# Patient Record
Sex: FEMALE | Race: WHITE | Employment: UNEMPLOYED | URBAN - METROPOLITAN AREA
[De-identification: names, ages, dates, MRNs, and addresses within clinical notes are randomized per-mention and may not be internally consistent; named-entity substitution may affect disease eponyms.]

---

## 2017-08-30 ENCOUNTER — OFFICE VISIT (OUTPATIENT)
Dept: FAMILY MEDICINE CLINIC | Age: 62
End: 2017-08-30

## 2017-08-30 VITALS
HEART RATE: 102 BPM | WEIGHT: 118 LBS | RESPIRATION RATE: 20 BRPM | TEMPERATURE: 98.5 F | DIASTOLIC BLOOD PRESSURE: 78 MMHG | HEIGHT: 65 IN | SYSTOLIC BLOOD PRESSURE: 124 MMHG | BODY MASS INDEX: 19.66 KG/M2

## 2017-08-30 DIAGNOSIS — T85.848D DENTAL IMPLANT PAIN, SUBSEQUENT ENCOUNTER: ICD-10-CM

## 2017-08-30 DIAGNOSIS — J45.909 UNCOMPLICATED ASTHMA, UNSPECIFIED ASTHMA SEVERITY: ICD-10-CM

## 2017-08-30 DIAGNOSIS — J30.2 SEASONAL ALLERGIC RHINITIS, UNSPECIFIED ALLERGIC RHINITIS TRIGGER: ICD-10-CM

## 2017-08-30 DIAGNOSIS — N20.0 KIDNEY STONE: Primary | ICD-10-CM

## 2017-08-30 DIAGNOSIS — K04.7 TOOTH ABSCESS: ICD-10-CM

## 2017-08-30 DIAGNOSIS — Z09 HOSPITAL DISCHARGE FOLLOW-UP: ICD-10-CM

## 2017-08-30 DIAGNOSIS — B02.9 HERPES ZOSTER WITHOUT COMPLICATION: ICD-10-CM

## 2017-08-30 LAB
BILIRUBIN, POC: NORMAL
BLOOD URINE, POC: NORMAL
CLARITY, POC: CLEAR
COLOR, POC: NORMAL
GLUCOSE URINE, POC: NORMAL
KETONES, POC: NORMAL
LEUKOCYTE EST, POC: NORMAL
NITRITE, POC: NORMAL
PH, POC: 7
PROTEIN, POC: NORMAL
SPECIFIC GRAVITY, POC: 1.02
UROBILINOGEN, POC: 0.2

## 2017-08-30 PROCEDURE — 81002 URINALYSIS NONAUTO W/O SCOPE: CPT | Performed by: FAMILY MEDICINE

## 2017-08-30 PROCEDURE — 99203 OFFICE O/P NEW LOW 30 MIN: CPT | Performed by: FAMILY MEDICINE

## 2017-08-30 RX ORDER — CHLORHEXIDINE GLUCONATE 1.2 MG/ML
RINSE BUCCAL
Refills: 0 | COMMUNITY
Start: 2017-07-18 | End: 2017-09-06 | Stop reason: ALTCHOICE

## 2017-08-30 RX ORDER — CETIRIZINE HYDROCHLORIDE 10 MG/1
10 TABLET ORAL
COMMUNITY
Start: 2017-06-19 | End: 2018-05-22 | Stop reason: SDUPTHER

## 2017-08-30 RX ORDER — AZELASTINE 1 MG/ML
2 SPRAY, METERED NASAL
COMMUNITY
Start: 2017-02-21 | End: 2018-07-16

## 2017-08-30 RX ORDER — BUPROPION HYDROCHLORIDE 150 MG/1
150 TABLET, EXTENDED RELEASE ORAL DAILY
COMMUNITY
Start: 2017-03-13 | End: 2018-03-30 | Stop reason: SDUPTHER

## 2017-08-30 RX ORDER — ALBUTEROL SULFATE 90 UG/1
2 AEROSOL, METERED RESPIRATORY (INHALATION)
COMMUNITY
Start: 2017-02-27 | End: 2018-07-16

## 2017-08-30 RX ORDER — HYDROCODONE BITARTRATE AND ACETAMINOPHEN 5; 325 MG/1; MG/1
1 TABLET ORAL 2 TIMES DAILY PRN
Qty: 30 TABLET | Refills: 0 | Status: SHIPPED | OUTPATIENT
Start: 2017-08-30 | End: 2017-09-14 | Stop reason: SDUPTHER

## 2017-08-30 RX ORDER — PENICILLIN V POTASSIUM 500 MG/1
500 TABLET ORAL 4 TIMES DAILY
COMMUNITY
End: 2017-10-24 | Stop reason: ALTCHOICE

## 2017-08-30 ASSESSMENT — ENCOUNTER SYMPTOMS
SORE THROAT: 0
DIARRHEA: 0
EYE ITCHING: 0
ABDOMINAL PAIN: 0
SHORTNESS OF BREATH: 0
COUGH: 0
CONSTIPATION: 0
EYE DISCHARGE: 0
SINUS PRESSURE: 0

## 2017-08-30 ASSESSMENT — PATIENT HEALTH QUESTIONNAIRE - PHQ9
SUM OF ALL RESPONSES TO PHQ QUESTIONS 1-9: 0
SUM OF ALL RESPONSES TO PHQ9 QUESTIONS 1 & 2: 0
1. LITTLE INTEREST OR PLEASURE IN DOING THINGS: 0

## 2017-09-06 ENCOUNTER — OFFICE VISIT (OUTPATIENT)
Dept: FAMILY MEDICINE CLINIC | Age: 62
End: 2017-09-06

## 2017-09-06 VITALS
TEMPERATURE: 97.9 F | WEIGHT: 116 LBS | DIASTOLIC BLOOD PRESSURE: 72 MMHG | SYSTOLIC BLOOD PRESSURE: 112 MMHG | HEIGHT: 64 IN | HEART RATE: 82 BPM | BODY MASS INDEX: 19.81 KG/M2 | RESPIRATION RATE: 16 BRPM

## 2017-09-06 DIAGNOSIS — R10.13 ABDOMINAL PAIN, EPIGASTRIC: ICD-10-CM

## 2017-09-06 DIAGNOSIS — N28.89 LEFT RENAL MASS: Primary | ICD-10-CM

## 2017-09-06 PROCEDURE — 99213 OFFICE O/P EST LOW 20 MIN: CPT | Performed by: FAMILY MEDICINE

## 2017-09-06 ASSESSMENT — ENCOUNTER SYMPTOMS
SINUS PRESSURE: 0
SORE THROAT: 0
VOMITING: 0
WHEEZING: 0
ABDOMINAL PAIN: 1
DIARRHEA: 1
COUGH: 0
NAUSEA: 1
CHEST TIGHTNESS: 0
SHORTNESS OF BREATH: 0
RHINORRHEA: 0

## 2017-09-14 RX ORDER — HYDROCODONE BITARTRATE AND ACETAMINOPHEN 5; 325 MG/1; MG/1
1 TABLET ORAL 2 TIMES DAILY PRN
Qty: 30 TABLET | Refills: 0 | Status: SHIPPED | OUTPATIENT
Start: 2017-09-14 | End: 2017-10-09 | Stop reason: SDUPTHER

## 2017-10-09 RX ORDER — HYDROCODONE BITARTRATE AND ACETAMINOPHEN 5; 325 MG/1; MG/1
1 TABLET ORAL 2 TIMES DAILY PRN
Qty: 30 TABLET | Refills: 0 | Status: SHIPPED | OUTPATIENT
Start: 2017-10-09 | End: 2017-10-18 | Stop reason: ALTCHOICE

## 2017-10-18 ENCOUNTER — OFFICE VISIT (OUTPATIENT)
Dept: FAMILY MEDICINE CLINIC | Age: 62
End: 2017-10-18

## 2017-10-18 VITALS
HEIGHT: 65 IN | SYSTOLIC BLOOD PRESSURE: 124 MMHG | RESPIRATION RATE: 20 BRPM | HEART RATE: 91 BPM | TEMPERATURE: 97.6 F | DIASTOLIC BLOOD PRESSURE: 82 MMHG | BODY MASS INDEX: 19.49 KG/M2 | WEIGHT: 117 LBS

## 2017-10-18 DIAGNOSIS — R53.81 MALAISE AND FATIGUE: Primary | ICD-10-CM

## 2017-10-18 DIAGNOSIS — Z13.6 SCREENING FOR CARDIOVASCULAR CONDITION: ICD-10-CM

## 2017-10-18 DIAGNOSIS — R53.83 MALAISE AND FATIGUE: ICD-10-CM

## 2017-10-18 DIAGNOSIS — R53.81 MALAISE AND FATIGUE: ICD-10-CM

## 2017-10-18 DIAGNOSIS — R53.83 MALAISE AND FATIGUE: Primary | ICD-10-CM

## 2017-10-18 DIAGNOSIS — R42 DIZZINESS: ICD-10-CM

## 2017-10-18 DIAGNOSIS — Z12.31 VISIT FOR SCREENING MAMMOGRAM: ICD-10-CM

## 2017-10-18 DIAGNOSIS — Z01.419 NORMAL GYNECOLOGIC EXAMINATION: ICD-10-CM

## 2017-10-18 LAB
ALBUMIN SERPL-MCNC: 4.4 G/DL (ref 3.9–4.9)
ALP BLD-CCNC: 109 U/L (ref 40–130)
ALT SERPL-CCNC: 10 U/L (ref 0–33)
ANION GAP SERPL CALCULATED.3IONS-SCNC: 14 MEQ/L (ref 7–13)
AST SERPL-CCNC: 16 U/L (ref 0–35)
BASOPHILS ABSOLUTE: 0.1 K/UL (ref 0–0.2)
BASOPHILS RELATIVE PERCENT: 1.4 %
BILIRUB SERPL-MCNC: 0.2 MG/DL (ref 0–1.2)
BUN BLDV-MCNC: 12 MG/DL (ref 8–23)
CALCIUM SERPL-MCNC: 10.2 MG/DL (ref 8.6–10.2)
CHLORIDE BLD-SCNC: 101 MEQ/L (ref 98–107)
CHOLESTEROL, TOTAL: 273 MG/DL (ref 0–199)
CO2: 25 MEQ/L (ref 22–29)
CREAT SERPL-MCNC: 0.57 MG/DL (ref 0.5–0.9)
EOSINOPHILS ABSOLUTE: 0.2 K/UL (ref 0–0.7)
EOSINOPHILS RELATIVE PERCENT: 2.9 %
GFR AFRICAN AMERICAN: >60
GFR NON-AFRICAN AMERICAN: >60
GLOBULIN: 3 G/DL (ref 2.3–3.5)
GLUCOSE BLD-MCNC: 89 MG/DL (ref 74–109)
HCT VFR BLD CALC: 38.6 % (ref 37–47)
HDLC SERPL-MCNC: 90 MG/DL (ref 40–59)
HEMOGLOBIN: 12.6 G/DL (ref 12–16)
LDL CHOLESTEROL CALCULATED: 158 MG/DL (ref 0–129)
LYMPHOCYTES ABSOLUTE: 1.4 K/UL (ref 1–4.8)
LYMPHOCYTES RELATIVE PERCENT: 27 %
MCH RBC QN AUTO: 29.1 PG (ref 27–31.3)
MCHC RBC AUTO-ENTMCNC: 32.8 % (ref 33–37)
MCV RBC AUTO: 88.8 FL (ref 82–100)
MONOCYTES ABSOLUTE: 0.6 K/UL (ref 0.2–0.8)
MONOCYTES RELATIVE PERCENT: 10.3 %
NEUTROPHILS ABSOLUTE: 3.1 K/UL (ref 1.4–6.5)
NEUTROPHILS RELATIVE PERCENT: 58.4 %
PDW BLD-RTO: 13.7 % (ref 11.5–14.5)
PLATELET # BLD: 592 K/UL (ref 130–400)
POTASSIUM SERPL-SCNC: 5.1 MEQ/L (ref 3.5–5.1)
RBC # BLD: 4.34 M/UL (ref 4.2–5.4)
SODIUM BLD-SCNC: 140 MEQ/L (ref 132–144)
T4 FREE: 1.21 NG/DL (ref 0.93–1.7)
TOTAL PROTEIN: 7.4 G/DL (ref 6.4–8.1)
TRIGL SERPL-MCNC: 126 MG/DL (ref 0–200)
WBC # BLD: 5.4 K/UL (ref 4.8–10.8)

## 2017-10-18 PROCEDURE — 99213 OFFICE O/P EST LOW 20 MIN: CPT | Performed by: FAMILY MEDICINE

## 2017-10-18 ASSESSMENT — ENCOUNTER SYMPTOMS
COUGH: 0
DIARRHEA: 0
ABDOMINAL PAIN: 0
SORE THROAT: 0
SHORTNESS OF BREATH: 0
EYE ITCHING: 0
CONSTIPATION: 0
EYE DISCHARGE: 0
SINUS PRESSURE: 0

## 2017-10-18 NOTE — PROGRESS NOTES
Spouse name: N/A    Number of children: 1    Years of education: N/A     Occupational History          Unemployed     Social History Main Topics    Smoking status: Never Smoker    Smokeless tobacco: Never Used    Alcohol use Yes    Drug use: No    Sexual activity: Yes     Partners: Male     Other Topics Concern    Not on file     Social History Narrative    No narrative on file     Family History   Problem Relation Age of Onset    High Blood Pressure Sister     Heart Disease Sister      MI    High Blood Pressure Brother     High Blood Pressure Sister     Heart Disease Sister      MI    High Blood Pressure Sister     High Blood Pressure Sister     High Blood Pressure Sister      Allergies   Allergen Reactions    Meperidine Nausea And Vomiting    Tetracycline Hives and Nausea And Vomiting     Current Outpatient Prescriptions   Medication Sig Dispense Refill    buPROPion (WELLBUTRIN SR) 150 MG extended release tablet Take 150 mg by mouth daily      cetirizine (ZYRTEC) 10 MG tablet Take 10 mg by mouth      penicillin v potassium (VEETID) 500 MG tablet Take 500 mg by mouth 4 times daily      azelastine (ASTELIN) 0.1 % nasal spray 2 sprays      albuterol sulfate  (90 Base) MCG/ACT inhaler 2 puffs by Other route       No current facility-administered medications for this visit. PMH, Surgical Hx, Family Hx, and Social Hx reviewed and updated. Health Maintenance reviewed. Objective    Vitals:    10/18/17 0911   BP: 124/82   Pulse: 91   Resp: 20   Temp: 97.6 °F (36.4 °C)   TempSrc: Temporal   Weight: 117 lb (53.1 kg)   Height: 5' 4.5\" (1.638 m)       Physical Exam   Constitutional: She is oriented to person, place, and time. She appears well-developed and well-nourished. HENT:   Head: Normocephalic. Mouth/Throat: Oropharynx is clear and moist.   Eyes: Pupils are equal, round, and reactive to light. Neck: Normal range of motion. Neck supple. No thyromegaly present. Cardiovascular: Normal rate and intact distal pulses. Exam reveals no gallop and no friction rub. No murmur heard. Pulmonary/Chest: Effort normal and breath sounds normal.   Abdominal: Soft. Bowel sounds are normal.   Musculoskeletal: Normal range of motion. Neurological: She is alert and oriented to person, place, and time. Skin: Skin is warm and dry. Psychiatric: She has a normal mood and affect. Her behavior is normal.     Exam she still has swelling in the mouth cannot open her mouth completely better she's starting able to be able to eat soft food. She is fatigued and tired sleeping okay. Denies chest pain or shortness of breath. Lungs are clear cardiac and abdominal exams are benign no peripheral edema history of anxiety or depression. For OB GYN for her Pelvic exam.  C blood drawn up results. Assessment & Plan   1. Malaise and fatigue  CBC Auto Differential    T4, Free    Comprehensive Metabolic Panel    Lipid Panel   2. Visit for screening mammogram  JULIUS DIGITAL SCREEN W CAD BILATERAL   3. Normal gynecologic examination  Ambulatory referral to Obstetrics / Gynecology   4. Dizziness  CBC Auto Differential    T4, Free    Comprehensive Metabolic Panel    Lipid Panel   5.  Screening for cardiovascular condition  Comprehensive Metabolic Panel    Lipid Panel     Orders Placed This Encounter   Procedures    JULIUS DIGITAL SCREEN W CAD BILATERAL     Standing Status:   Future     Standing Expiration Date:   10/18/2018     Order Specific Question:   Reason for exam:     Answer:   screening    CBC Auto Differential     Standing Status:   Future     Number of Occurrences:   1     Standing Expiration Date:   10/18/2018    T4, Free     Standing Status:   Future     Number of Occurrences:   1     Standing Expiration Date:   10/18/2018    Comprehensive Metabolic Panel     Standing Status:   Future     Number of Occurrences:   1     Standing Expiration Date:   10/18/2018    Lipid Panel     Standing Status: Future     Number of Occurrences:   1     Standing Expiration Date:   10/18/2018     Order Specific Question:   Is Patient Fasting?/# of Hours     Answer:   n/a    Ambulatory referral to Obstetrics / Gynecology     Referral Priority:   Routine     Referral Type:   Consult for Advice and Opinion     Referral Reason:   Specialty Services Required     Referred to Provider:   Audrey Araya MD     Requested Specialty:   Obstetrics & Gynecology     Number of Visits Requested:   1     No orders of the defined types were placed in this encounter. Medications Discontinued During This Encounter   Medication Reason    HYDROcodone-acetaminophen ([address]) 5-325 MG per tablet Therapy completed     No Follow-up on file.         Controlled Substances Monitoring:          Marin Mays MD

## 2017-10-19 ENCOUNTER — TELEPHONE (OUTPATIENT)
Dept: FAMILY MEDICINE CLINIC | Age: 62
End: 2017-10-19

## 2017-10-20 RX ORDER — ATORVASTATIN CALCIUM 40 MG/1
40 TABLET, FILM COATED ORAL DAILY
Qty: 30 TABLET | Refills: 3 | Status: SHIPPED | OUTPATIENT
Start: 2017-10-20 | End: 2018-01-08 | Stop reason: SINTOL

## 2017-10-23 ENCOUNTER — HOSPITAL ENCOUNTER (OUTPATIENT)
Dept: WOMENS IMAGING | Age: 62
Discharge: HOME | End: 2017-10-23
Payer: COMMERCIAL

## 2017-10-23 DIAGNOSIS — Z12.31 VISIT FOR SCREENING MAMMOGRAM: ICD-10-CM

## 2017-10-23 PROCEDURE — G0202 SCR MAMMO BI INCL CAD: HCPCS

## 2017-10-24 ENCOUNTER — OFFICE VISIT (OUTPATIENT)
Dept: OBGYN | Age: 62
End: 2017-10-24

## 2017-10-24 VITALS
WEIGHT: 116 LBS | DIASTOLIC BLOOD PRESSURE: 86 MMHG | BODY MASS INDEX: 19.81 KG/M2 | SYSTOLIC BLOOD PRESSURE: 134 MMHG | HEIGHT: 64 IN

## 2017-10-24 DIAGNOSIS — Z01.419 PAP SMEAR, AS PART OF ROUTINE GYNECOLOGICAL EXAMINATION: ICD-10-CM

## 2017-10-24 DIAGNOSIS — Z13.820 OSTEOPOROSIS SCREENING: ICD-10-CM

## 2017-10-24 DIAGNOSIS — Z01.419 WELL WOMAN EXAM WITH ROUTINE GYNECOLOGICAL EXAM: Primary | ICD-10-CM

## 2017-10-24 DIAGNOSIS — Z11.51 SCREENING FOR HPV (HUMAN PAPILLOMAVIRUS): ICD-10-CM

## 2017-10-24 PROCEDURE — 99386 PREV VISIT NEW AGE 40-64: CPT | Performed by: NURSE PRACTITIONER

## 2017-10-24 RX ORDER — DEXLANSOPRAZOLE 60 MG/1
CAPSULE, DELAYED RELEASE ORAL
Refills: 5 | COMMUNITY
Start: 2017-10-19 | End: 2018-05-09 | Stop reason: SDUPTHER

## 2017-10-24 NOTE — PROGRESS NOTES
SUBJECTIVE: 65 y.o.  female here for well woman exam. Pt is postmenopausal and no complaints today. Pt reports menopause s/s as none. Pt denies smoking and drinks ETOH rarely. Pt denies regular exercise, but reports she \"loves walking\" , but recently had all of her her upper teeth removed which she states the pain kept her in bed for the past 3 wks. Pt does not take a multivitamin and states she is \"lactose intolerant\". Pt was recently started on Dexilant and states she read that \"it can affect the bones\". Pt reports she has had a recent colonoscopy and mammogram.    Review of Systems:   General ROS: negative   Psychological ROS: negative   EENT ROS: negative   Endocrine ROS: negative   Respiratory ROS: no cough, shortness of breath, or wheezing   Cardiovascular ROS: no chest pain or dyspnea on exertion   Gastrointestinal ROS: no abdominal pain, change in bowel habits, or black or bloody stools   Genito-Urinary ROS: no dysuria, trouble voiding, or hematuria   Musculoskeletal ROS: negative   Neurological ROS: no TIA or stroke symptoms   Dermatological ROS: negative     OBJECTIVE: Please see chart for additional documentation   /86   Ht 5' 4\" (1.626 m)   Wt 116 lb (52.6 kg)   Breastfeeding? No   BMI 19.91 kg/m²      Pt's medical and surgical history reviewed    Physical Exam:  GENERAL:  She appears well, afebrile. NEUROLOGIC: Alert and oriented to person, place and time  NECK: no thyroidmegaly  BREASTS: Bilateral breasts without masses, skin changes or nipple discharge LUNGS: Clear to ascultation bilaterally  CVS: regular rate and rhythm  LYMPHATIC: No palpable lymph nodes  ABDOMEN: benign, soft, nontender, no masses, bowel sounds active all four quadrants. No liver or splenic organomegaly.    SKIN: atrophic changes noted, warm and dry, normal texture and tone, no lesions    Pelvic Exam:   EFG: normal external genitalia, atrophic changes noted   URETHRA: normal appearing without diverticula or lesions   VULVA: normal appearing vulva with no masses, tenderness or lesions, atrophy noted   VAGINA: atrophic changes, pale mucosa  CERVIX: parous, no lesions  UTERUS: uterus is normal size, shape, consistency and nontender   ADNEXA: normal adnexa in size, nontender and no masses. PERINEUM: normal appearing without lesions or masses   ANUS: normal appearing without lesions or masses, no fissures or hemorrhoids     ASSESSMENT:   Postmenopause  Well woman with routine gynecologic exam  Osteoporosis screening    PLAN:   Pt counseled on osteoporosis prevention. Advised calcium intake of 1200 mg (diet and supplement) and Vitamin D 400-800 IU daily. DEXA ordered  Pap with hpv pending  Reinforced self breast exam, recent mammogram   Encouraged healthy lifestyle and moderated exercise (30 mins walking daily). Pt verbalized understanding. Routine health screenings and precautions discussed. RTC 12 months for well woman exam or visits PRN.

## 2017-10-24 NOTE — PATIENT INSTRUCTIONS
for heart attack and stroke. · Blood pressure. Have your blood pressure checked during a routine doctor visit. Your doctor will tell you how often to check your blood pressure based on your age, your blood pressure results, and other factors. · Mammogram. Ask your doctor how often you should have a mammogram, which is an X-ray of your breasts. A mammogram can spot breast cancer before it can be felt and when it is easiest to treat. · Pap test and pelvic exam. Ask your doctor how often you should have a Pap test. You may not need to have a Pap test as often as you used to. · Vision. Have your eyes checked every year or two or as often as your doctor suggests. Some experts recommend that you have yearly exams for glaucoma and other age-related eye problems starting at age 48. · Hearing. Tell your doctor if you notice any change in your hearing. You can have tests to find out how well you hear. · Diabetes. Ask your doctor whether you should have tests for diabetes. · Colon cancer. You should begin tests for colon cancer at age 48. You may have one of several tests. Your doctor will tell you how often to have tests based on your age and risk. Risks include whether you already had a precancerous polyp removed from your colon or whether your parents, sisters and brothers, or children have had colon cancer. · Thyroid disease. Talk to your doctor about whether to have your thyroid checked as part of a regular physical exam. Women have an increased chance of a thyroid problem. · Osteoporosis. You should begin tests for bone density at age 72. If you are younger than 72, ask your doctor whether you have factors that may increase your risk for this disease. You may want to have this test before age 72. · Heart attack and stroke risk. At least every 4 to 6 years, you should have your risk for heart attack and stroke assessed.  Your doctor uses factors such as your age, blood pressure, cholesterol, and whether you smoke or have diabetes to show what your risk for a heart attack or stroke is over the next 10 years. When should you call for help? Watch closely for changes in your health, and be sure to contact your doctor if you have any problems or symptoms that concern you. Where can you learn more? Go to https://chpepiceweb.Clearwave. org and sign in to your TapTrack account. Enter S569 in the [employer] box to learn more about \"Well Visit, Women 50 to 72: Care Instructions. \"     If you do not have an account, please click on the \"Sign Up Now\" link. Current as of: July 19, 2016  Content Version: 11.3  © 6802-8468 [employer]. Care instructions adapted under license by [facility] ([facility]). If you have questions about a medical condition or this instruction, always ask your healthcare professional. [place] [address] any warranty or liability for your use of this information. Patient Education        Breast Self-Exam: Care Instructions  Your Care Instructions  A breast self-exam is when you check your breasts for lumps or changes. This regular exam helps you learn how your breasts normally look and feel. Most breast problems or changes are not because of cancer. Breast self-exam is not a substitute for a mammogram. Having regular breast exams by your doctor and regular mammograms improve your chances of finding any problems with your breasts. Some women set a time each month to do a step-by-step breast self-exam. Other women like a less formal system. They might look at their breasts as they brush their teeth, or feel their breasts once in a while in the shower. If you notice a change in your breast, tell your doctor. Follow-up care is a key part of your treatment and safety. Be sure to make and go to all appointments, and call your doctor if you are having problems. Its also a good idea to know your test results and keep a list of the medicines you take.   How do you do a breast self-exam?  · The best time to examine your breasts is usually one week after your menstrual period begins. Your breasts should not be tender then. If you do not have periods, you might do your exam on a day of the month that is easy to remember. · To examine your breasts:  ¨ Remove all your clothes above the waist and lie down. When you are lying down, your breast tissue spreads evenly over your chest wall, which makes it easier to feel all your breast tissue. ¨ Use the padsnot the fingertipsof the 3 middle fingers of your left hand to check your right breast. Move your fingers slowly in small coin-sized circles that overlap. ¨ Use three levels of pressure to feel of all your breast tissue. Use light pressure to feel the tissue close to the skin surface. Use medium pressure to feel a little deeper. Use firm pressure to feel your tissue close to your breastbone and ribs. Use each pressure level to feel your breast tissue before moving on to the next spot. ¨ Check your entire breast, moving up and down as if following a strip from the collarbone to the bra line, and from the armpit to the ribs. Repeat until you have covered the entire breast.  ¨ Repeat this procedure for your left breast, using the pads of the 3 middle fingers of your right hand. · To examine your breasts while in the shower:  ¨ Place one arm over your head and lightly soap your breast on that side. ¨ Using the pads of your fingers, gently move your hand over your breast (in the strip pattern described above), feeling carefully for any lumps or changes. ¨ Repeat for the other breast.  · Have your doctor inspect anything you notice to see if you need further testing. Where can you learn more? Go to https://Respect Your Universekeaganeb.Euthymics Bioscience. org and sign in to your Little Borrowed Dress account. Enter P148 in the [employer] box to learn more about \"Breast Self-Exam: Care Instructions. \"     If you do not have an account, please click on the \"Sign Up Now\" link. Current as of: July 26, 2016  Content Version: 11.3  © 6194-0901 [employer]. Care instructions adapted under license by [facility] ([facility]). If you have questions about a medical condition or this instruction, always ask your healthcare professional. [address] any warranty or liability for your use of this information. Patient Education        Learning About Pap Tests  What is a Pap test?  The Pap test (also called a Pap smear) is a screening test for cancer of the cervix, which is the lower part of the uterus that opens into the vagina. The test can help your doctor find early changes in the cells that could lead to cancer. During the test, the doctor or nurse will insert a tool called a speculum into your vagina. The speculum gently spreads apart the vaginal walls. That allows your doctor to see inside the vagina and the cervix. He or she uses a cotton swab or brush to collect cell samples from your cervix. Try to schedule the test when you're not having your period. To get ready for a Pap test, avoid douches, tampons, vaginal medicines, sprays, or powders for at least a day before you have the test.  When should you have a Pap test?  Women should start having Pap tests at age 24. If you are younger than 24 and are sexually active, it's still a good idea to have regular testing for sexually transmitted infections. · Women 21 to 30 should have Pap tests every 3 years. · Women 30 to 72 may continue to have a Pap test every 3 years as long as their results are normal. Or they can choose to have a Pap test and an HPV test. This is called co-testing. With co-testing, women can have screening every 5 years as long as their test results are normal.  · Women 72 and older may no longer need Pap tests. When to stop having Pap tests depends on your medical history, your overall health, and your risk of cervical cell changes or cervical cancer.  Talk with your doctor about whether you should stop or continue to have Pap tests. He or she can help you decide. These recommendations do not apply to women who have had a serious abnormal Pap test result or who have certain health problems. Talk to your doctor about how often you should be tested. There is also a newer test called a primary HPV test. It is used in women ages 22 and older. And it is done every 3 years, as long as a woman's test results are normal. A woman who has this test doesn't need to have a Pap test.  Having the HPV vaccine does not change your need for screening tests. Women who have had the HPV vaccine should follow the same screening schedules as women who have not had the vaccine. What happens after the test?  The sample of cells taken during your test will be sent to a lab so that an expert can look at the cells. It usually takes a week or two to get the results back. · A normal result means that the test did not find any abnormal cells in the sample. · An abnormal result can mean many things. Most of these are not cancer. The results of your test may be abnormal because:  ¨ You have an infection of the vagina or cervix, such as a yeast infection. ¨ You have an IUD (intrauterine device for birth control). ¨ You have low estrogen levels after menopause that are causing the cells to change. ¨ You have cell changes that may be a sign of precancer or cancer. The results are ranked based on how serious the changes might be. Where can you learn more? Go to https://PansievekeaganDHgate.PowerInbox. org and sign in to your AndroBioSys account. Enter P919 in the [facility] box to learn more about \"Learning About Pap Tests. \"     If you do not have an account, please click on the \"Sign Up Now\" link. Current as of: July 26, 2016  Content Version: 11.3  © 5096-8968 [employer]. Care instructions adapted under license by [facility] ([facility]).  If you have questions about a medical condition or each day. ¨ Eat foods rich in calcium, like yogurt, cheese, milk, and dark green vegetables. ¨ Eat foods rich in vitamin D, like eggs, fatty fish, cereal, and fortified milk. ¨ Get some sunshine. Your body uses sunshine to make its own vitamin D. The safest time to be out in the sun is before 10 a.m. or after 3 p.m. Avoid getting sunburned. Sunburn can increase your risk of skin cancer. ¨ Talk to your doctor about taking a calcium plus vitamin D supplement. Ask about what type of calcium is right for you, and how much to take at a time. Adults ages 23 to 48 should not get more than 2,500 mg of calcium and 4,000 IU of vitamin D each day, whether it is from supplements and/or food. Adults ages 46 and older should not get more than 2,000 mg of calcium and 4,000 IU of vitamin D each day from supplements and/or food. · Get regular bone-building exercise. Weight-bearing and resistance exercises keep bones healthy by working the muscles and bones against gravity. Start out at an exercise level that feels right for you. Add a little at a time until you can do the following:  ¨ Do 30 minutes of weight-bearing exercise on most days of the week. Walking, jogging, stair climbing, and dancing are good choices. ¨ Do resistance exercises with weights or elastic bands 2 to 3 days a week. · Limit alcohol. Drink no more than 1 alcohol drink a day if you are a woman. Drink no more than 2 alcohol drinks a day if you are a man. · Do not smoke. Smoking can make bones thin faster. If you need help quitting, talk to your doctor about stop-smoking programs and medicines. These can increase your chances of quitting for good. When should you call for help? Watch closely for changes in your health, and be sure to contact your doctor if you have any problems. Where can you learn more? Go to https://nayely.VisualDNA. org and sign in to your K[employer]a Software account.  Enter J130 in the NetEase.com box to learn more about

## 2017-10-26 ENCOUNTER — HOSPITAL ENCOUNTER (OUTPATIENT)
Dept: GENERAL RADIOLOGY | Age: 62
Discharge: HOME | End: 2017-10-26
Payer: COMMERCIAL

## 2017-10-26 DIAGNOSIS — Z13.820 OSTEOPOROSIS SCREENING: ICD-10-CM

## 2017-10-26 PROCEDURE — 77080 DXA BONE DENSITY AXIAL: CPT

## 2017-10-31 ENCOUNTER — TELEPHONE (OUTPATIENT)
Dept: OBGYN | Age: 62
End: 2017-10-31

## 2017-10-31 DIAGNOSIS — Z01.419 PAP SMEAR, AS PART OF ROUTINE GYNECOLOGICAL EXAMINATION: ICD-10-CM

## 2017-10-31 DIAGNOSIS — Z11.51 SCREENING FOR HPV (HUMAN PAPILLOMAVIRUS): ICD-10-CM

## 2017-10-31 NOTE — TELEPHONE ENCOUNTER
----- Message from Leslie Luu CNP sent at 10/27/2017  8:41 AM EDT -----  Please notify pt that she is overall osteopenic, however fracture risk is considered high regarding the Femur Neck Right and treatment is advised, send to her home pamphlet on osteoporosis, advise pt to ensure adequate intake of Calcium 1200 mg (diet and supplement) and Vitamin D 400-800 IU daily, pt should make an appt to discuss therapy. Next DEXA should be scheduled in 1 year to assess therapy.

## 2017-12-04 ENCOUNTER — OFFICE VISIT (OUTPATIENT)
Dept: FAMILY MEDICINE CLINIC | Age: 62
End: 2017-12-04

## 2017-12-04 VITALS
TEMPERATURE: 98 F | RESPIRATION RATE: 20 BRPM | HEIGHT: 64 IN | WEIGHT: 118 LBS | SYSTOLIC BLOOD PRESSURE: 128 MMHG | HEART RATE: 92 BPM | DIASTOLIC BLOOD PRESSURE: 84 MMHG | BODY MASS INDEX: 20.14 KG/M2

## 2017-12-04 DIAGNOSIS — K08.89 PAIN, DENTAL: Primary | ICD-10-CM

## 2017-12-04 DIAGNOSIS — E78.5 HYPERLIPIDEMIA, UNSPECIFIED HYPERLIPIDEMIA TYPE: ICD-10-CM

## 2017-12-04 PROCEDURE — 3014F SCREEN MAMMO DOC REV: CPT | Performed by: FAMILY MEDICINE

## 2017-12-04 PROCEDURE — 3017F COLORECTAL CA SCREEN DOC REV: CPT | Performed by: FAMILY MEDICINE

## 2017-12-04 PROCEDURE — G8420 CALC BMI NORM PARAMETERS: HCPCS | Performed by: FAMILY MEDICINE

## 2017-12-04 PROCEDURE — G8427 DOCREV CUR MEDS BY ELIG CLIN: HCPCS | Performed by: FAMILY MEDICINE

## 2017-12-04 PROCEDURE — G8484 FLU IMMUNIZE NO ADMIN: HCPCS | Performed by: FAMILY MEDICINE

## 2017-12-04 PROCEDURE — 1036F TOBACCO NON-USER: CPT | Performed by: FAMILY MEDICINE

## 2017-12-04 PROCEDURE — 99213 OFFICE O/P EST LOW 20 MIN: CPT | Performed by: FAMILY MEDICINE

## 2017-12-04 RX ORDER — HYDROCODONE BITARTRATE AND ACETAMINOPHEN 5; 325 MG/1; MG/1
1 TABLET ORAL EVERY 6 HOURS PRN
Qty: 30 TABLET | Refills: 0 | Status: SHIPPED | OUTPATIENT
Start: 2017-12-04 | End: 2018-01-23 | Stop reason: ALTCHOICE

## 2017-12-04 ASSESSMENT — ENCOUNTER SYMPTOMS
COUGH: 0
CONSTIPATION: 0
DIARRHEA: 0
SINUS PRESSURE: 0
EYE ITCHING: 0
SHORTNESS OF BREATH: 0
EYE DISCHARGE: 0
ABDOMINAL PAIN: 0
SORE THROAT: 0

## 2017-12-04 NOTE — PROGRESS NOTES
Subjective  Stephani Larios, 65 y.o. female presents today with:  Chief Complaint   Patient presents with    Oral Pain     discuss oral states that she having dental surgey done on 12/05/2017, does currenly pain is mild. states that eating ice cream helps relieve the pain.  Hyperlipidemia     follow up on Lipitor 40 mg was given this on 10/18. states that she has improvement. Patient to have more surgery tomorrow + had all required T's cold the oral surgeon refused to give her pain medication told to take Tylenol she's concerned that she'll be in pain again tomorrow      HPI        No other questions and or concerns for today's visit      Review of Systems   Constitutional: Negative for appetite change, fatigue and fever. HENT: Negative for congestion, ear pain, sinus pressure and sore throat. Eyes: Negative for discharge and itching. Respiratory: Negative for cough and shortness of breath. Cardiovascular: Negative for chest pain and palpitations. Gastrointestinal: Negative for abdominal pain, constipation and diarrhea. Endocrine: Negative for polydipsia. Genitourinary: Negative for difficulty urinating. Musculoskeletal: Negative for gait problem. Skin: Negative. Neurological: Negative for dizziness. Hematological: Negative. Psychiatric/Behavioral: Negative.           Past Medical History:   Diagnosis Date    Asthma     Seasonal allergies     Shingles outbreak      Past Surgical History:   Procedure Laterality Date    CYST INCISION AND DRAINAGE      right shoulder    INTRAOCULAR LENS PROSTHESIS INSERTION      lens implants, bilateral implants 11/14/14    PLANTAR FASCIA SURGERY  2001    right foot      Social History     Social History    Marital status: Single     Spouse name: N/A    Number of children: 1    Years of education: N/A     Occupational History          Unemployed     Social History Main Topics    Smoking status: Never Smoker    Smokeless tobacco:

## 2017-12-05 LAB
CHOLESTEROL, TOTAL: 209 MG/DL (ref 0–199)
HDLC SERPL-MCNC: 111 MG/DL (ref 40–59)
LDL CHOLESTEROL CALCULATED: 79 MG/DL (ref 0–129)
TRIGL SERPL-MCNC: 94 MG/DL (ref 0–200)

## 2018-01-08 ENCOUNTER — OFFICE VISIT (OUTPATIENT)
Dept: FAMILY MEDICINE CLINIC | Age: 63
End: 2018-01-08

## 2018-01-08 VITALS
SYSTOLIC BLOOD PRESSURE: 132 MMHG | HEIGHT: 64 IN | WEIGHT: 119.2 LBS | HEART RATE: 98 BPM | RESPIRATION RATE: 20 BRPM | TEMPERATURE: 97.9 F | BODY MASS INDEX: 20.35 KG/M2 | DIASTOLIC BLOOD PRESSURE: 70 MMHG

## 2018-01-08 DIAGNOSIS — R53.83 FATIGUE, UNSPECIFIED TYPE: Primary | ICD-10-CM

## 2018-01-08 DIAGNOSIS — R53.83 FATIGUE, UNSPECIFIED TYPE: ICD-10-CM

## 2018-01-08 DIAGNOSIS — K13.79 MOUTH PAIN: ICD-10-CM

## 2018-01-08 LAB
ANION GAP SERPL CALCULATED.3IONS-SCNC: 16 MEQ/L (ref 7–13)
BUN BLDV-MCNC: 19 MG/DL (ref 8–23)
CALCIUM SERPL-MCNC: 9.8 MG/DL (ref 8.6–10.2)
CHLORIDE BLD-SCNC: 103 MEQ/L (ref 98–107)
CO2: 25 MEQ/L (ref 22–29)
CREAT SERPL-MCNC: 0.62 MG/DL (ref 0.5–0.9)
GFR AFRICAN AMERICAN: >60
GFR NON-AFRICAN AMERICAN: >60
GLUCOSE BLD-MCNC: 92 MG/DL (ref 74–109)
POTASSIUM SERPL-SCNC: 4.9 MEQ/L (ref 3.5–5.1)
SODIUM BLD-SCNC: 144 MEQ/L (ref 132–144)
T4 FREE: 1.08 NG/DL (ref 0.93–1.7)

## 2018-01-08 PROCEDURE — 1036F TOBACCO NON-USER: CPT | Performed by: FAMILY MEDICINE

## 2018-01-08 PROCEDURE — 3014F SCREEN MAMMO DOC REV: CPT | Performed by: FAMILY MEDICINE

## 2018-01-08 PROCEDURE — G8427 DOCREV CUR MEDS BY ELIG CLIN: HCPCS | Performed by: FAMILY MEDICINE

## 2018-01-08 PROCEDURE — G8484 FLU IMMUNIZE NO ADMIN: HCPCS | Performed by: FAMILY MEDICINE

## 2018-01-08 PROCEDURE — 99213 OFFICE O/P EST LOW 20 MIN: CPT | Performed by: FAMILY MEDICINE

## 2018-01-08 PROCEDURE — 3017F COLORECTAL CA SCREEN DOC REV: CPT | Performed by: FAMILY MEDICINE

## 2018-01-08 PROCEDURE — G8420 CALC BMI NORM PARAMETERS: HCPCS | Performed by: FAMILY MEDICINE

## 2018-01-08 ASSESSMENT — ENCOUNTER SYMPTOMS
VOICE CHANGE: 0
SORE THROAT: 0
SHORTNESS OF BREATH: 1
DIARRHEA: 0
CONSTIPATION: 0

## 2018-01-08 NOTE — PROGRESS NOTES
Subjective  [place], 65 y.o. female presents today with:  Chief Complaint   Patient presents with    Shortness of Breath     with activity, since teeth extracted, not feeling good           HPI    Patient with complaints of feeling fatigued rundown and tired. Still having trouble with her teeth. No other questions and or concerns for today's visit      Review of Systems   Constitutional: Positive for appetite change and fatigue. HENT: Negative for sore throat and voice change. Respiratory: Positive for shortness of breath. Cardiovascular: Negative for chest pain. Gastrointestinal: Negative for constipation and diarrhea.          Past Medical History:   Diagnosis Date    Asthma     Seasonal allergies     Shingles outbreak      Past Surgical History:   Procedure Laterality Date    CYST INCISION AND DRAINAGE      right shoulder    INTRAOCULAR LENS PROSTHESIS INSERTION      lens implants, bilateral implants 11/14/14    PLANTAR FASCIA SURGERY  2001    right foot      Social History     Social History    Marital status: Single     Spouse name: N/A    Number of children: 1    Years of education: N/A     Occupational History          Unemployed     Social History Main Topics    Smoking status: Never Smoker    Smokeless tobacco: Never Used    Alcohol use Yes      Comment: Once in a While    Drug use: No    Sexual activity: No     Other Topics Concern    Not on file     Social History Narrative    No narrative on file     Family History   Problem Relation Age of Onset    High Blood Pressure Sister     Heart Disease Sister      MI    High Blood Pressure Brother     High Blood Pressure Sister     Heart Disease Sister      MI    High Blood Pressure Sister     High Blood Pressure Sister     High Blood Pressure Sister      Allergies   Allergen Reactions    Meperidine Nausea And Vomiting    Tetracycline Hives and Nausea And Vomiting     Current Outpatient Prescriptions

## 2018-01-09 DIAGNOSIS — R53.83 FATIGUE, UNSPECIFIED TYPE: Primary | ICD-10-CM

## 2018-01-09 LAB
BASOPHILS ABSOLUTE: 0 K/UL (ref 0–0.2)
BASOPHILS RELATIVE PERCENT: 1.1 %
EOSINOPHILS ABSOLUTE: 0.1 K/UL (ref 0–0.7)
EOSINOPHILS RELATIVE PERCENT: 2.3 %
FOLATE: 16.2 NG/ML (ref 7.3–26.1)
HCT VFR BLD CALC: 37.6 % (ref 37–47)
HEMOGLOBIN: 12.1 G/DL (ref 12–16)
IRON SATURATION: 19 % (ref 11–46)
IRON: 69 UG/DL (ref 37–145)
LYMPHOCYTES ABSOLUTE: 0.8 K/UL (ref 1–4.8)
LYMPHOCYTES RELATIVE PERCENT: 20 %
MCH RBC QN AUTO: 29.5 PG (ref 27–31.3)
MCHC RBC AUTO-ENTMCNC: 32.2 % (ref 33–37)
MCV RBC AUTO: 91.7 FL (ref 82–100)
MONOCYTES ABSOLUTE: 0.5 K/UL (ref 0.2–0.8)
MONOCYTES RELATIVE PERCENT: 12.7 %
NEUTROPHILS ABSOLUTE: 2.7 K/UL (ref 1.4–6.5)
NEUTROPHILS RELATIVE PERCENT: 63.9 %
PDW BLD-RTO: 14.9 % (ref 11.5–14.5)
PLATELET # BLD: 467 K/UL (ref 130–400)
RBC # BLD: 4.1 M/UL (ref 4.2–5.4)
TOTAL IRON BINDING CAPACITY: 358 UG/DL (ref 178–450)
VITAMIN B-12: 550 PG/ML (ref 211–946)
WBC # BLD: 4.2 K/UL (ref 4.8–10.8)

## 2018-01-12 RX ORDER — ROSUVASTATIN CALCIUM 20 MG/1
20 TABLET, COATED ORAL NIGHTLY
Qty: 30 TABLET | Refills: 3 | Status: SHIPPED | OUTPATIENT
Start: 2018-01-12 | End: 2018-05-22 | Stop reason: SDUPTHER

## 2018-01-19 ENCOUNTER — TELEPHONE (OUTPATIENT)
Dept: FAMILY MEDICINE CLINIC | Age: 63
End: 2018-01-19

## 2018-01-23 ENCOUNTER — OFFICE VISIT (OUTPATIENT)
Dept: FAMILY MEDICINE CLINIC | Age: 63
End: 2018-01-23
Payer: COMMERCIAL

## 2018-01-23 VITALS
BODY MASS INDEX: 20.49 KG/M2 | HEART RATE: 80 BPM | WEIGHT: 120 LBS | TEMPERATURE: 97.4 F | OXYGEN SATURATION: 99 % | SYSTOLIC BLOOD PRESSURE: 122 MMHG | HEIGHT: 64 IN | RESPIRATION RATE: 15 BRPM | DIASTOLIC BLOOD PRESSURE: 86 MMHG

## 2018-01-23 DIAGNOSIS — J01.90 ACUTE BACTERIAL SINUSITIS: Primary | ICD-10-CM

## 2018-01-23 DIAGNOSIS — B96.89 ACUTE BACTERIAL SINUSITIS: Primary | ICD-10-CM

## 2018-01-23 PROCEDURE — 3017F COLORECTAL CA SCREEN DOC REV: CPT | Performed by: FAMILY MEDICINE

## 2018-01-23 PROCEDURE — G8420 CALC BMI NORM PARAMETERS: HCPCS | Performed by: FAMILY MEDICINE

## 2018-01-23 PROCEDURE — G8427 DOCREV CUR MEDS BY ELIG CLIN: HCPCS | Performed by: FAMILY MEDICINE

## 2018-01-23 PROCEDURE — 1036F TOBACCO NON-USER: CPT | Performed by: FAMILY MEDICINE

## 2018-01-23 PROCEDURE — 3014F SCREEN MAMMO DOC REV: CPT | Performed by: FAMILY MEDICINE

## 2018-01-23 PROCEDURE — G8484 FLU IMMUNIZE NO ADMIN: HCPCS | Performed by: FAMILY MEDICINE

## 2018-01-23 PROCEDURE — 99213 OFFICE O/P EST LOW 20 MIN: CPT | Performed by: FAMILY MEDICINE

## 2018-01-23 RX ORDER — AZITHROMYCIN 250 MG/1
TABLET, FILM COATED ORAL
Qty: 1 PACKET | Refills: 0 | Status: SHIPPED | OUTPATIENT
Start: 2018-01-23 | End: 2018-02-02

## 2018-01-23 ASSESSMENT — ENCOUNTER SYMPTOMS
NAUSEA: 0
EYES NEGATIVE: 1
SINUS PAIN: 1
COUGH: 1
CONSTIPATION: 0
ABDOMINAL PAIN: 0
SINUS PRESSURE: 1
SHORTNESS OF BREATH: 0
DIARRHEA: 0

## 2018-01-29 ENCOUNTER — TELEPHONE (OUTPATIENT)
Dept: FAMILY MEDICINE CLINIC | Age: 63
End: 2018-01-29

## 2018-01-29 RX ORDER — CEFDINIR 300 MG/1
300 CAPSULE ORAL 2 TIMES DAILY
Qty: 20 CAPSULE | Refills: 0 | Status: SHIPPED | OUTPATIENT
Start: 2018-01-29 | End: 2018-05-22 | Stop reason: ALTCHOICE

## 2018-03-30 RX ORDER — BUPROPION HYDROCHLORIDE 150 MG/1
150 TABLET, EXTENDED RELEASE ORAL DAILY
Qty: 60 TABLET | Refills: 5 | Status: SHIPPED | OUTPATIENT
Start: 2018-03-30 | End: 2018-10-12 | Stop reason: SDUPTHER

## 2018-03-30 NOTE — TELEPHONE ENCOUNTER
Pt is calling to request a rx refill on her Wellbutrin 150 mg (pending).  States she used to get it from her dr, Dr Kati Guerrero when she lived in [facility] it sent to her pharmacy [address]

## 2018-04-23 ENCOUNTER — TELEPHONE (OUTPATIENT)
Dept: FAMILY MEDICINE CLINIC | Age: 63
End: 2018-04-23

## 2018-05-09 RX ORDER — DEXLANSOPRAZOLE 60 MG/1
CAPSULE, DELAYED RELEASE ORAL
Qty: 30 CAPSULE | Refills: 5 | Status: SHIPPED | OUTPATIENT
Start: 2018-05-09 | End: 2018-12-11 | Stop reason: SDUPTHER

## 2018-05-22 ENCOUNTER — OFFICE VISIT (OUTPATIENT)
Dept: FAMILY MEDICINE CLINIC | Age: 63
End: 2018-05-22
Payer: COMMERCIAL

## 2018-05-22 VITALS
HEIGHT: 64 IN | HEART RATE: 78 BPM | RESPIRATION RATE: 20 BRPM | SYSTOLIC BLOOD PRESSURE: 110 MMHG | DIASTOLIC BLOOD PRESSURE: 72 MMHG

## 2018-05-22 DIAGNOSIS — M25.562 LEFT KNEE PAIN, UNSPECIFIED CHRONICITY: Primary | ICD-10-CM

## 2018-05-22 DIAGNOSIS — E78.5 HYPERLIPIDEMIA, UNSPECIFIED HYPERLIPIDEMIA TYPE: ICD-10-CM

## 2018-05-22 LAB
CHOLESTEROL, TOTAL: 204 MG/DL (ref 0–199)
HDLC SERPL-MCNC: 113 MG/DL (ref 40–59)
LDL CHOLESTEROL CALCULATED: 78 MG/DL (ref 0–129)
TRIGL SERPL-MCNC: 67 MG/DL (ref 0–200)

## 2018-05-22 PROCEDURE — G8427 DOCREV CUR MEDS BY ELIG CLIN: HCPCS | Performed by: FAMILY MEDICINE

## 2018-05-22 PROCEDURE — G8420 CALC BMI NORM PARAMETERS: HCPCS | Performed by: FAMILY MEDICINE

## 2018-05-22 PROCEDURE — 1036F TOBACCO NON-USER: CPT | Performed by: FAMILY MEDICINE

## 2018-05-22 PROCEDURE — 99213 OFFICE O/P EST LOW 20 MIN: CPT | Performed by: FAMILY MEDICINE

## 2018-05-22 PROCEDURE — 3017F COLORECTAL CA SCREEN DOC REV: CPT | Performed by: FAMILY MEDICINE

## 2018-05-22 RX ORDER — CETIRIZINE HYDROCHLORIDE 10 MG/1
10 TABLET ORAL DAILY
Qty: 30 TABLET | Refills: 5 | Status: SHIPPED | OUTPATIENT
Start: 2018-05-22 | End: 2018-10-26 | Stop reason: ALTCHOICE

## 2018-05-22 RX ORDER — ROSUVASTATIN CALCIUM 20 MG/1
20 TABLET, COATED ORAL NIGHTLY
Qty: 30 TABLET | Refills: 5 | Status: SHIPPED | OUTPATIENT
Start: 2018-05-22

## 2018-05-22 RX ORDER — PREDNISONE 10 MG/1
TABLET ORAL
Qty: 51 TABLET | Refills: 0 | Status: SHIPPED | OUTPATIENT
Start: 2018-05-22 | End: 2018-06-01

## 2018-05-22 ASSESSMENT — ENCOUNTER SYMPTOMS
ABDOMINAL PAIN: 0
EYES NEGATIVE: 1
NAUSEA: 0
DIARRHEA: 0
COUGH: 0
SHORTNESS OF BREATH: 0
CONSTIPATION: 0

## 2018-06-13 ENCOUNTER — TELEPHONE (OUTPATIENT)
Dept: FAMILY MEDICINE CLINIC | Age: 63
End: 2018-06-13

## 2018-06-13 ENCOUNTER — NURSE ONLY (OUTPATIENT)
Dept: FAMILY MEDICINE CLINIC | Age: 63
End: 2018-06-13
Payer: COMMERCIAL

## 2018-06-13 DIAGNOSIS — Z87.442 HISTORY OF KIDNEY STONES: ICD-10-CM

## 2018-06-13 DIAGNOSIS — R10.9 ABDOMINAL PAIN, UNSPECIFIED ABDOMINAL LOCATION: ICD-10-CM

## 2018-06-13 DIAGNOSIS — Z87.442 HISTORY OF KIDNEY STONES: Primary | ICD-10-CM

## 2018-06-13 LAB
BILIRUBIN, POC: NORMAL
BLOOD URINE, POC: NORMAL
CLARITY, POC: NORMAL
COLOR, POC: NORMAL
GLUCOSE URINE, POC: NORMAL
KETONES, POC: NORMAL
LEUKOCYTE EST, POC: NORMAL
NITRITE, POC: NORMAL
PH, POC: 6
PROTEIN, POC: NORMAL
SPECIFIC GRAVITY, POC: 1.02
UROBILINOGEN, POC: NORMAL

## 2018-06-13 PROCEDURE — 81003 URINALYSIS AUTO W/O SCOPE: CPT | Performed by: FAMILY MEDICINE

## 2018-06-15 ENCOUNTER — TELEPHONE (OUTPATIENT)
Dept: FAMILY MEDICINE CLINIC | Age: 63
End: 2018-06-15

## 2018-06-15 LAB — URINE CULTURE, ROUTINE: NORMAL

## 2018-07-03 ENCOUNTER — TELEPHONE (OUTPATIENT)
Dept: FAMILY MEDICINE CLINIC | Age: 63
End: 2018-07-03

## 2018-07-03 NOTE — TELEPHONE ENCOUNTER
Patient called and stated that she is having issues with the kidney stone she is having some blood as she goes to the bathroom. Should patient go to the ER or make an appointment with you on Thursday.     Please advise

## 2018-07-10 ENCOUNTER — OFFICE VISIT (OUTPATIENT)
Dept: FAMILY MEDICINE CLINIC | Age: 63
End: 2018-07-10
Payer: COMMERCIAL

## 2018-07-10 VITALS
RESPIRATION RATE: 18 BRPM | DIASTOLIC BLOOD PRESSURE: 74 MMHG | HEART RATE: 72 BPM | WEIGHT: 123 LBS | SYSTOLIC BLOOD PRESSURE: 128 MMHG | HEIGHT: 64 IN | TEMPERATURE: 97.9 F | BODY MASS INDEX: 21 KG/M2

## 2018-07-10 DIAGNOSIS — N30.01 ACUTE CYSTITIS WITH HEMATURIA: ICD-10-CM

## 2018-07-10 DIAGNOSIS — R10.9 FLANK PAIN: Primary | ICD-10-CM

## 2018-07-10 DIAGNOSIS — R10.9 ABDOMINAL PRESSURE: ICD-10-CM

## 2018-07-10 LAB
BILIRUBIN, POC: NORMAL
BLOOD URINE, POC: NORMAL
CLARITY, POC: CLEAR
COLOR, POC: YELLOW
GLUCOSE URINE, POC: NORMAL
KETONES, POC: NORMAL
LEUKOCYTE EST, POC: NORMAL
NITRITE, POC: NORMAL
PH, POC: 7
PROTEIN, POC: NORMAL
SPECIFIC GRAVITY, POC: 1.02
UROBILINOGEN, POC: NORMAL

## 2018-07-10 PROCEDURE — G8427 DOCREV CUR MEDS BY ELIG CLIN: HCPCS | Performed by: FAMILY MEDICINE

## 2018-07-10 PROCEDURE — G8420 CALC BMI NORM PARAMETERS: HCPCS | Performed by: FAMILY MEDICINE

## 2018-07-10 PROCEDURE — 3017F COLORECTAL CA SCREEN DOC REV: CPT | Performed by: FAMILY MEDICINE

## 2018-07-10 PROCEDURE — 1036F TOBACCO NON-USER: CPT | Performed by: FAMILY MEDICINE

## 2018-07-10 PROCEDURE — 81003 URINALYSIS AUTO W/O SCOPE: CPT | Performed by: FAMILY MEDICINE

## 2018-07-10 PROCEDURE — 99213 OFFICE O/P EST LOW 20 MIN: CPT | Performed by: FAMILY MEDICINE

## 2018-07-10 RX ORDER — CEPHALEXIN 500 MG/1
CAPSULE ORAL
Refills: 0 | COMMUNITY
Start: 2018-07-07 | End: 2018-07-16 | Stop reason: ALTCHOICE

## 2018-07-10 ASSESSMENT — PATIENT HEALTH QUESTIONNAIRE - PHQ9
SUM OF ALL RESPONSES TO PHQ9 QUESTIONS 1 & 2: 0
SUM OF ALL RESPONSES TO PHQ QUESTIONS 1-9: 0
1. LITTLE INTEREST OR PLEASURE IN DOING THINGS: 0
2. FEELING DOWN, DEPRESSED OR HOPELESS: 0

## 2018-07-11 ENCOUNTER — TELEPHONE (OUTPATIENT)
Dept: FAMILY MEDICINE CLINIC | Age: 63
End: 2018-07-11

## 2018-07-11 NOTE — TELEPHONE ENCOUNTER
Patient calling for 2 different things. 1. She forgot to get a refill on her Zyrtec yesterday. Can you send in a refill to her pharmacy. 2. She also states that she is not getting any better, she states that since she has been on the antibiotic for 4 days she thought she should be feeling better but she just does not feel well. She is wanting to know if there is anything else you can recommend. Please advise.

## 2018-07-12 NOTE — TELEPHONE ENCOUNTER
Patient returned call and stated that she is still having the chills,cramps, and the pressure in pelvic area. She is feeling a little better but not much.     Please advise

## 2018-07-13 ENCOUNTER — TELEPHONE (OUTPATIENT)
Dept: UROLOGY | Age: 63
End: 2018-07-13

## 2018-07-13 ENCOUNTER — TELEPHONE (OUTPATIENT)
Dept: FAMILY MEDICINE CLINIC | Age: 63
End: 2018-07-13

## 2018-07-13 DIAGNOSIS — R10.9 FLANK PAIN: Primary | ICD-10-CM

## 2018-07-13 DIAGNOSIS — N30.01 ACUTE CYSTITIS WITH HEMATURIA: ICD-10-CM

## 2018-07-13 NOTE — TELEPHONE ENCOUNTER
Patient calling requesting a referral to Urology. She is having the chills, cramps, and the pressure in pelvic area. She was seen 07/10/18, flank pain and acute cystitis with hematuria.      Referral pending    Please advise

## 2018-07-16 ENCOUNTER — OFFICE VISIT (OUTPATIENT)
Dept: OBGYN CLINIC | Age: 63
End: 2018-07-16
Payer: COMMERCIAL

## 2018-07-16 ENCOUNTER — TELEPHONE (OUTPATIENT)
Dept: OBGYN CLINIC | Age: 63
End: 2018-07-16

## 2018-07-16 VITALS
HEIGHT: 64 IN | DIASTOLIC BLOOD PRESSURE: 80 MMHG | WEIGHT: 124.4 LBS | BODY MASS INDEX: 21.24 KG/M2 | SYSTOLIC BLOOD PRESSURE: 120 MMHG

## 2018-07-16 DIAGNOSIS — R10.2 PELVIC PRESSURE IN FEMALE: ICD-10-CM

## 2018-07-16 DIAGNOSIS — R10.2 PELVIC PAIN IN FEMALE: ICD-10-CM

## 2018-07-16 DIAGNOSIS — R10.2 PELVIC PRESSURE IN FEMALE: Primary | ICD-10-CM

## 2018-07-16 PROCEDURE — 99204 OFFICE O/P NEW MOD 45 MIN: CPT | Performed by: OBSTETRICS & GYNECOLOGY

## 2018-07-16 PROCEDURE — G8427 DOCREV CUR MEDS BY ELIG CLIN: HCPCS | Performed by: OBSTETRICS & GYNECOLOGY

## 2018-07-16 PROCEDURE — 3017F COLORECTAL CA SCREEN DOC REV: CPT | Performed by: OBSTETRICS & GYNECOLOGY

## 2018-07-16 PROCEDURE — G8420 CALC BMI NORM PARAMETERS: HCPCS | Performed by: OBSTETRICS & GYNECOLOGY

## 2018-07-16 PROCEDURE — 1036F TOBACCO NON-USER: CPT | Performed by: OBSTETRICS & GYNECOLOGY

## 2018-07-16 RX ORDER — ONDANSETRON 4 MG/1
TABLET, ORALLY DISINTEGRATING ORAL
Refills: 0 | COMMUNITY
Start: 2018-07-14 | End: 2018-10-01 | Stop reason: ALTCHOICE

## 2018-07-16 RX ORDER — KETOROLAC TROMETHAMINE 10 MG/1
TABLET, FILM COATED ORAL
Refills: 0 | COMMUNITY
Start: 2018-07-14 | End: 2018-08-02 | Stop reason: SDUPTHER

## 2018-07-16 ASSESSMENT — ENCOUNTER SYMPTOMS
SHORTNESS OF BREATH: 0
WHEEZING: 0
DIARRHEA: 0
CONSTIPATION: 0
COUGH: 0
ABDOMINAL PAIN: 0
BLOOD IN STOOL: 0

## 2018-07-16 NOTE — PROGRESS NOTES
History and Physical  [facility] [address]  P: [phone] / F: [phone]  Savannah Claudio  2018              59 y.o. Chief Complaint   Patient presents with    Follow-Up from Hospital     pt here to f/u after ER visit [facility] on . pt went to ER for pressure/chills while standing. pt told she had something at fundus of uterus that should be consulted with Ob/Gyn. pt c/o having pressure today becasue she has been laying down last two days. /80   Ht 5' 4\" (1.626 m)   Wt 124 lb 6.4 oz (56.4 kg)   BMI 21.35 kg/m²   Alllergies:  Oxytetracycline; Meperidine; and Tetracycline               Primary Care Physician: Marvel Bridges MD    HPI : Savannah Snyder 59 y.o. Gilberto Honer female  Pt here today for ER follow-up, pt reports having pain, pressure and chills when standing in her pelvis. Pt with normal testing in ER with the exception of small probable nabothian cyst. Pt with NEG guaiac in office today, hx of colonoscopy x1 year ago that was normal. Risks, benefits and alternative therapies for treatment discussed. Genital cultures collected in the office today, recommended endosee/embx for further evaluation.  ALL?s answered   ________________________________________________________________________  Obstetric History       T0      L1     SAB0   TAB0   Ectopic0   Molar0   Multiple0   Live Births1       # Outcome Date GA Lbr Nahun/2nd Weight Sex Delivery Anes PTL Lv   1 Para      Vag-Spont   SJ        Past Medical History:   Diagnosis Date    Asthma     Depression     Hyperlipidemia     Seasonal allergies     Shingles outbreak                                                                    Past Surgical History:   Procedure Laterality Date    CYST INCISION AND DRAINAGE      right shoulder    INTRAOCULAR LENS PROSTHESIS INSERTION      lens implants, bilateral implants 14    PLANTAR

## 2018-07-16 NOTE — TELEPHONE ENCOUNTER
Pt went to [facility] er 7-13-18. and was told to see Dr Ashley Dallas asap  She states that she has something on the tip of her uterus and   Cannot stand because she is in pain. She states that she cannot miss  Any more work and wants to be seen today.  I did try to marilin for  Thursday and she said that the is no way she will be able  To   Last until than  She is asking for a call back asap [phone]

## 2018-07-16 NOTE — TELEPHONE ENCOUNTER
Pt states she went to er on 7-7-18 and also 7-13-18 to [facility]  Release faxed to [facility] and pt appt is marilin for 7-16-18 10:40

## 2018-07-16 NOTE — PATIENT INSTRUCTIONS
ENDOSEE - OFFICE HYSTEROSCOPY    Endosee Office Hysteroscopy is a diagnostic tool that lets your doctor see the inside of your uterus quickly and easily right in her office, without the need for general anesthesia in an operating room. Tracey Urbano helps find the cause of several common issues women face such as abnormal bleeding and infertility concerns. Before using Endosee, your doctor will need to look at your health history, but most women are able to have the procedure without a problem. Do I need to do anything prior to my Endosee? 1. No unprotected intercourse for 3 weeks prior to procedure  2. Take 800mg of Motrin 1 hour prior to your procedure  3. If you start your period, you can still have the Endosee done if your bleeding is very light. If your cycle is heavy please call to reschedule. 4.  Avoid vaginal medications or creams & douching for 24 hours before the procedure. 5. Please come prepared to leave a urine sample prior to your procedure. What can I expect when I go home? 1. You may have some spotting or light discharge for up to 24 hours. 2.  You can resume all normal activities. 3.  The procedure may start your period. *If a biopsy is done, you will need to schedule a follow up appointment for 2 weeks following your procedure to get your results from the physician.

## 2018-07-18 LAB — URINE CULTURE, ROUTINE: NORMAL

## 2018-07-24 DIAGNOSIS — R10.2 PELVIC PAIN IN FEMALE: ICD-10-CM

## 2018-07-24 DIAGNOSIS — R10.2 PELVIC PRESSURE IN FEMALE: ICD-10-CM

## 2018-08-01 ENCOUNTER — PROCEDURE VISIT (OUTPATIENT)
Dept: OBGYN CLINIC | Age: 63
End: 2018-08-01
Payer: COMMERCIAL

## 2018-08-01 ENCOUNTER — TELEPHONE (OUTPATIENT)
Dept: OBGYN CLINIC | Age: 63
End: 2018-08-01

## 2018-08-01 VITALS
DIASTOLIC BLOOD PRESSURE: 86 MMHG | WEIGHT: 122.6 LBS | HEIGHT: 64 IN | BODY MASS INDEX: 20.93 KG/M2 | SYSTOLIC BLOOD PRESSURE: 130 MMHG

## 2018-08-01 DIAGNOSIS — N94.9 GENITAL LESION, FEMALE: ICD-10-CM

## 2018-08-01 DIAGNOSIS — R10.2 PELVIC PRESSURE IN FEMALE: ICD-10-CM

## 2018-08-01 DIAGNOSIS — R10.2 PELVIC PRESSURE IN FEMALE: Primary | ICD-10-CM

## 2018-08-01 DIAGNOSIS — R10.2 PELVIC PAIN IN FEMALE: ICD-10-CM

## 2018-08-01 PROCEDURE — 3017F COLORECTAL CA SCREEN DOC REV: CPT | Performed by: OBSTETRICS & GYNECOLOGY

## 2018-08-01 PROCEDURE — 58558 HYSTEROSCOPY BIOPSY: CPT | Performed by: OBSTETRICS & GYNECOLOGY

## 2018-08-01 PROCEDURE — 1036F TOBACCO NON-USER: CPT | Performed by: OBSTETRICS & GYNECOLOGY

## 2018-08-01 PROCEDURE — G8420 CALC BMI NORM PARAMETERS: HCPCS | Performed by: OBSTETRICS & GYNECOLOGY

## 2018-08-01 PROCEDURE — G8427 DOCREV CUR MEDS BY ELIG CLIN: HCPCS | Performed by: OBSTETRICS & GYNECOLOGY

## 2018-08-01 PROCEDURE — 99214 OFFICE O/P EST MOD 30 MIN: CPT | Performed by: OBSTETRICS & GYNECOLOGY

## 2018-08-01 ASSESSMENT — ENCOUNTER SYMPTOMS
DIARRHEA: 0
SHORTNESS OF BREATH: 0
BLOOD IN STOOL: 0
WHEEZING: 0
COUGH: 0
CONSTIPATION: 0
ABDOMINAL PAIN: 0

## 2018-08-01 NOTE — PROGRESS NOTES
History and Physical  [facility] [address]  P: [phone] / F: [phone]  [facility]  2018              59 y.o. Chief Complaint   Patient presents with    Procedure     endosee/embx. consent signed. no allergies to betadine. torodal taken. denies any unprotected intercourse. /70   Ht 5' 4\" (1.626 m)   Wt 122 lb 9.6 oz (55.6 kg)   BMI 21.04 kg/m²   Alllergies:  Oxytetracycline; Meperidine; and Tetracycline               Primary Care Physician: Grace Davis MD    HPI : [facility] 59 y.o. Kelsea Joyce female  Pt here today for results of sono and embx/endosee. Pts sono reviewed,   UTERUS: Atrophic measuring 6.2 x 1.6 x 3.2 cm. No discrete myometrial    mass. There is a 8 mm anechoic focus in the lower uterine segment    corresponding to abnormality and CT which is nonspecific may relate to    small amount of fluid within the endocervical canal or postsurgical    changes of prior . Pt reports all vaginal births no hx of a . Pt states she has never had any abdominal surgeries. Will follow up with a renal bladder sono for further evaluation and follow up. Will move forward today in office with endosee/embx      PROCEDURE NOTE:  Risks, benefits and alternative therapies for vaginal bleeding evaluation discussed. Pt agrees to [facility] with EMB and consent obtained. Pt advised of associated risks and complications. Pt gives verbal agreement to proceed with procedure.      Speculum was placed and the cervix was visualized. Betadine is applied if not allergic. The cervix was grasped with tenaculum. The uterus sounded to 9cm and the uterine Pipelle was used. Three passes were performed. Moderate tissue noted. The tissue was sent to pathology. Os finders utilized as needed for gentle dilation.  The endosee hysteroscopic device is easily passed through the cervical os and sterile saline is utilized to visualize the entire uterine cavity which is inspected with the following findings:  Bilateral tubal ostia visualized, no suspicious lesions noted, atrophic endometrium.     Hysteroscopy is adequate.   Patient tolerated the procedure well.     Findings: no abnormal findings on exam     Plan: RTO 2-3 weeks for results and follow up          ________________________________________________________________________  Obstetric History       T0      L1     SAB0   TAB0   Ectopic0   Molar0   Multiple0   Live Births1       # Outcome Date GA Lbr Nahun/2nd Weight Sex Delivery Anes PTL Lv   1 Para      Vag-Spont   SJ        Past Medical History:   Diagnosis Date    Asthma     Depression     Hyperlipidemia     Seasonal allergies     Shingles outbreak                                                                    Past Surgical History:   Procedure Laterality Date    CYST INCISION AND DRAINAGE      right shoulder    INTRAOCULAR LENS PROSTHESIS INSERTION      lens implants, bilateral implants 14    PLANTAR FASCIA SURGERY      right foot      Family History   Problem Relation Age of Onset    High Blood Pressure Sister     Heart Disease Sister         MI    High Blood Pressure Brother     High Blood Pressure Sister     Heart Disease Sister         MI    High Blood Pressure Sister     High Blood Pressure Sister     High Blood Pressure Sister      Social History     Social History    Marital status: Single     Spouse name: N/A    Number of children: 1    Years of education: N/A     Occupational History          Unemployed     Social History Main Topics    Smoking status: Never Smoker    Smokeless tobacco: Never Used    Alcohol use Yes      Comment: Once in a While    Drug use: No    Sexual activity: No     Other Topics Concern    Not on file     Social History Narrative    No narrative on file         MEDICATIONS:  Current Outpatient Prescriptions on File Prior to Visit Medication Sig Dispense Refill    ketorolac (TORADOL) 10 MG tablet TK 1 T PO  QID PRN P  0    ondansetron (ZOFRAN-ODT) 4 MG disintegrating tablet TK 1 T PO Q 4-6 H PRN NV  0    cetirizine (ZYRTEC) 10 MG tablet Take 1 tablet by mouth daily 30 tablet 5    rosuvastatin (CRESTOR) 20 MG tablet Take 1 tablet by mouth nightly 30 tablet 5    vitamin D (CHOLECALCIFEROL) 1000 UNIT TABS tablet Take 1,000 Units by mouth daily      DEXILANT 60 MG CPDR delayed release capsule TAKE 1 CAPSULE ONCE DAILY 30 capsule 5    buPROPion (WELLBUTRIN SR) 150 MG extended release tablet Take 1 tablet by mouth daily 60 tablet 5    Probiotic Product (PROBIOTIC FORMULA PO) Take by mouth       No current facility-administered medications on file prior to visit. ALLERGIES:  Allergies as of 08/01/2018 - Review Complete 08/01/2018   Allergen Reaction Noted    Oxytetracycline  01/31/2018    Meperidine Nausea And Vomiting 10/13/2012    Tetracycline Hives and Nausea And Vomiting 10/13/2012           Gynecologic History:     No LMP recorded. Patient is postmenopausal.      ________________________________________________________________________  REVIEW OF SYSTEMS:       Review of Systems   Respiratory: Negative for cough, shortness of breath and wheezing. Cardiovascular: Negative for chest pain, palpitations and leg swelling. Gastrointestinal: Negative for abdominal pain, blood in stool, constipation and diarrhea. Genitourinary: Positive for pelvic pain (and pressure). Negative for difficulty urinating, dysuria, flank pain and hematuria. Skin: Negative. Psychiatric/Behavioral: Negative.                                                                                                                                                    PHYSICAL Exam:    Constitutional:  Vitals:    08/01/18 1231   BP: 110/70   Weight: 122 lb 9.6 oz (55.6 kg)   Height: 5' 4\" (1.626 m)       Physical Exam   Constitutional: She is oriented to

## 2018-08-02 RX ORDER — KETOROLAC TROMETHAMINE 10 MG/1
TABLET, FILM COATED ORAL
Qty: 20 TABLET | Refills: 0 | Status: SHIPPED | OUTPATIENT
Start: 2018-08-02 | End: 2018-10-26 | Stop reason: ALTCHOICE

## 2018-08-03 ENCOUNTER — TELEPHONE (OUTPATIENT)
Dept: OBGYN CLINIC | Age: 63
End: 2018-08-03

## 2018-08-04 LAB
FINAL REPORT: NORMAL
PRELIMINARY: NORMAL

## 2018-08-07 ENCOUNTER — HOSPITAL ENCOUNTER (OUTPATIENT)
Dept: ULTRASOUND IMAGING | Age: 63
Discharge: HOME | End: 2018-08-09
Payer: COMMERCIAL

## 2018-08-07 DIAGNOSIS — R10.2 PELVIC PRESSURE IN FEMALE: ICD-10-CM

## 2018-08-07 PROCEDURE — 51798 US URINE CAPACITY MEASURE: CPT

## 2018-08-07 PROCEDURE — 76770 US EXAM ABDO BACK WALL COMP: CPT

## 2018-08-13 ENCOUNTER — TELEPHONE (OUTPATIENT)
Dept: OBGYN CLINIC | Age: 63
End: 2018-08-13

## 2018-08-15 ENCOUNTER — OFFICE VISIT (OUTPATIENT)
Dept: OBGYN CLINIC | Age: 63
End: 2018-08-15
Payer: COMMERCIAL

## 2018-08-15 VITALS
DIASTOLIC BLOOD PRESSURE: 78 MMHG | BODY MASS INDEX: 21.17 KG/M2 | HEIGHT: 64 IN | SYSTOLIC BLOOD PRESSURE: 130 MMHG | WEIGHT: 124 LBS

## 2018-08-15 DIAGNOSIS — R10.2 PELVIC PRESSURE IN FEMALE: Primary | ICD-10-CM

## 2018-08-15 DIAGNOSIS — N95.0 PMB (POSTMENOPAUSAL BLEEDING): ICD-10-CM

## 2018-08-15 PROCEDURE — 3017F COLORECTAL CA SCREEN DOC REV: CPT | Performed by: OBSTETRICS & GYNECOLOGY

## 2018-08-15 PROCEDURE — 1036F TOBACCO NON-USER: CPT | Performed by: OBSTETRICS & GYNECOLOGY

## 2018-08-15 PROCEDURE — 99213 OFFICE O/P EST LOW 20 MIN: CPT | Performed by: OBSTETRICS & GYNECOLOGY

## 2018-08-15 PROCEDURE — G8427 DOCREV CUR MEDS BY ELIG CLIN: HCPCS | Performed by: OBSTETRICS & GYNECOLOGY

## 2018-08-15 PROCEDURE — G8420 CALC BMI NORM PARAMETERS: HCPCS | Performed by: OBSTETRICS & GYNECOLOGY

## 2018-08-15 ASSESSMENT — ENCOUNTER SYMPTOMS
ABDOMINAL PAIN: 0
CONSTIPATION: 0
DIARRHEA: 0
BLOOD IN STOOL: 0
SHORTNESS OF BREATH: 0
WHEEZING: 0
COUGH: 0

## 2018-08-15 NOTE — PROGRESS NOTES
Blood Pressure Sister     High Blood Pressure Sister     High Blood Pressure Sister      Social History     Social History    Marital status: Single     Spouse name: N/A    Number of children: 1    Years of education: N/A     Occupational History          Unemployed     Social History Main Topics    Smoking status: Never Smoker    Smokeless tobacco: Never Used    Alcohol use Yes      Comment: Once in a While    Drug use: No    Sexual activity: No     Other Topics Concern    Not on file     Social History Narrative    No narrative on file         MEDICATIONS:  Current Outpatient Prescriptions on File Prior to Visit   Medication Sig Dispense Refill    ketorolac (TORADOL) 10 MG tablet TK 1 T PO  QID PRN P 20 tablet 0    ondansetron (ZOFRAN-ODT) 4 MG disintegrating tablet TK 1 T PO Q 4-6 H PRN NV  0    cetirizine (ZYRTEC) 10 MG tablet Take 1 tablet by mouth daily 30 tablet 5    rosuvastatin (CRESTOR) 20 MG tablet Take 1 tablet by mouth nightly 30 tablet 5    vitamin D (CHOLECALCIFEROL) 1000 UNIT TABS tablet Take 1,000 Units by mouth daily      DEXILANT 60 MG CPDR delayed release capsule TAKE 1 CAPSULE ONCE DAILY 30 capsule 5    buPROPion (WELLBUTRIN SR) 150 MG extended release tablet Take 1 tablet by mouth daily 60 tablet 5    Probiotic Product (PROBIOTIC FORMULA PO) Take by mouth       No current facility-administered medications on file prior to visit. ALLERGIES:  Allergies as of 08/15/2018 - Review Complete 08/15/2018   Allergen Reaction Noted    Oxytetracycline  01/31/2018    Meperidine Nausea And Vomiting 10/13/2012    Tetracycline Hives and Nausea And Vomiting 10/13/2012           Gynecologic History:     No LMP recorded. Patient is postmenopausal.      ________________________________________________________________________  REVIEW OF SYSTEMS:       Review of Systems   Respiratory: Negative for cough, shortness of breath and wheezing.     Cardiovascular: Negative for chest pain,

## 2018-08-23 ENCOUNTER — OFFICE VISIT (OUTPATIENT)
Dept: UROLOGY | Age: 63
End: 2018-08-23

## 2018-08-23 DIAGNOSIS — N20.0 KIDNEY STONE: Primary | ICD-10-CM

## 2018-08-23 PROCEDURE — 99999 PR OFFICE/OUTPT VISIT,PROCEDURE ONLY: CPT | Performed by: UROLOGY

## 2018-08-24 ENCOUNTER — OFFICE VISIT (OUTPATIENT)
Dept: UROLOGY | Age: 63
End: 2018-08-24
Payer: COMMERCIAL

## 2018-08-24 VITALS
BODY MASS INDEX: 20.83 KG/M2 | DIASTOLIC BLOOD PRESSURE: 80 MMHG | HEIGHT: 64 IN | SYSTOLIC BLOOD PRESSURE: 122 MMHG | WEIGHT: 122 LBS | HEART RATE: 82 BPM

## 2018-08-24 DIAGNOSIS — N20.0 KIDNEY STONE: Primary | ICD-10-CM

## 2018-08-24 LAB
BILIRUBIN, POC: NORMAL
BLOOD URINE, POC: NORMAL
CLARITY, POC: CLEAR
COLOR, POC: YELLOW
GLUCOSE URINE, POC: NORMAL
KETONES, POC: NORMAL
LEUKOCYTE EST, POC: NORMAL
NITRITE, POC: NORMAL
PH, POC: 7
PROTEIN, POC: NORMAL
SPECIFIC GRAVITY, POC: 1.01
UROBILINOGEN, POC: 0.2

## 2018-08-24 PROCEDURE — 3017F COLORECTAL CA SCREEN DOC REV: CPT | Performed by: UROLOGY

## 2018-08-24 PROCEDURE — G8420 CALC BMI NORM PARAMETERS: HCPCS | Performed by: UROLOGY

## 2018-08-24 PROCEDURE — G8427 DOCREV CUR MEDS BY ELIG CLIN: HCPCS | Performed by: UROLOGY

## 2018-08-24 PROCEDURE — 99202 OFFICE O/P NEW SF 15 MIN: CPT | Performed by: UROLOGY

## 2018-08-24 PROCEDURE — 1036F TOBACCO NON-USER: CPT | Performed by: UROLOGY

## 2018-08-24 PROCEDURE — 81003 URINALYSIS AUTO W/O SCOPE: CPT | Performed by: UROLOGY

## 2018-08-24 NOTE — PROGRESS NOTES
Age of Onset    High Blood Pressure Sister     Heart Disease Sister         MI    High Blood Pressure Brother     High Blood Pressure Sister     Heart Disease Sister         MI    High Blood Pressure Sister     High Blood Pressure Sister     High Blood Pressure Sister      Current Outpatient Prescriptions   Medication Sig Dispense Refill    cetirizine (ZYRTEC) 10 MG tablet Take 1 tablet by mouth daily 30 tablet 5    rosuvastatin (CRESTOR) 20 MG tablet Take 1 tablet by mouth nightly 30 tablet 5    vitamin D (CHOLECALCIFEROL) 1000 UNIT TABS tablet Take 1,000 Units by mouth daily      DEXILANT 60 MG CPDR delayed release capsule TAKE 1 CAPSULE ONCE DAILY 30 capsule 5    buPROPion (WELLBUTRIN SR) 150 MG extended release tablet Take 1 tablet by mouth daily 60 tablet 5    Probiotic Product (PROBIOTIC FORMULA PO) Take by mouth      ketorolac (TORADOL) 10 MG tablet TK 1 T PO  QID PRN P 20 tablet 0    ondansetron (ZOFRAN-ODT) 4 MG disintegrating tablet TK 1 T PO Q 4-6 H PRN NV  0     No current facility-administered medications for this visit. Oxytetracycline; Meperidine; and Tetracycline  All reviewed and verified by Dr Berta Aguayo on today's visit    No results found for: PSA, PSADIA  Results for POC orders placed in visit on 08/24/18   POCT Urinalysis No Micro (Auto)   Result Value Ref Range    Color, UA yellow     Clarity, UA clear     Glucose, UA POC neg     Bilirubin, UA neg     Ketones, UA neg     Spec Grav, UA 1.015     Blood, UA POC neg     pH, UA 7.0     Protein, UA POC neg     Urobilinogen, UA 0.2     Leukocytes, UA neg     Nitrite, UA neg        Physical Exam  Vitals:    08/24/18 1140   BP: 122/80   Pulse: 82   Weight: 122 lb (55.3 kg)   Height: 5' 4\" (1.626 m)     Constitutional: patient is oriented to person, place, and time. patient appears well-developed. pleasant and cooperative. Ears: Adequate hearing/no hearing loss  Head: Normocephalic. Atraumatic  Neck: Normal range of motion.

## 2018-09-05 RX ORDER — ALBUTEROL SULFATE 90 UG/1
2 AEROSOL, METERED RESPIRATORY (INHALATION) EVERY 6 HOURS PRN
Qty: 1 INHALER | Refills: 3 | Status: SHIPPED | OUTPATIENT
Start: 2018-09-05

## 2018-10-01 ENCOUNTER — TELEPHONE (OUTPATIENT)
Dept: FAMILY MEDICINE CLINIC | Age: 63
End: 2018-10-01

## 2018-10-01 ENCOUNTER — OFFICE VISIT (OUTPATIENT)
Dept: FAMILY MEDICINE CLINIC | Age: 63
End: 2018-10-01
Payer: COMMERCIAL

## 2018-10-01 VITALS
SYSTOLIC BLOOD PRESSURE: 134 MMHG | BODY MASS INDEX: 21 KG/M2 | WEIGHT: 123 LBS | TEMPERATURE: 97.5 F | HEART RATE: 81 BPM | DIASTOLIC BLOOD PRESSURE: 72 MMHG | RESPIRATION RATE: 20 BRPM | HEIGHT: 64 IN

## 2018-10-01 DIAGNOSIS — H93.8X3 EAR FULLNESS, BILATERAL: ICD-10-CM

## 2018-10-01 DIAGNOSIS — R10.2 PELVIC PRESSURE IN FEMALE: ICD-10-CM

## 2018-10-01 DIAGNOSIS — H91.92 HEARING LOSS OF LEFT EAR, UNSPECIFIED HEARING LOSS TYPE: ICD-10-CM

## 2018-10-01 DIAGNOSIS — Z09 HOSPITAL DISCHARGE FOLLOW-UP: ICD-10-CM

## 2018-10-01 DIAGNOSIS — R35.0 URINARY FREQUENCY: Primary | ICD-10-CM

## 2018-10-01 DIAGNOSIS — R35.0 URINARY FREQUENCY: ICD-10-CM

## 2018-10-01 LAB
BILIRUBIN, POC: NORMAL
BLOOD URINE, POC: NORMAL
CLARITY, POC: CLEAR
COLOR, POC: YELLOW
GLUCOSE URINE, POC: NORMAL
KETONES, POC: NORMAL
LEUKOCYTE EST, POC: NORMAL
NITRITE, POC: NORMAL
PH, POC: 7
PROTEIN, POC: NORMAL
SPECIFIC GRAVITY, POC: 1.01
UROBILINOGEN, POC: NORMAL

## 2018-10-01 PROCEDURE — 81002 URINALYSIS NONAUTO W/O SCOPE: CPT | Performed by: FAMILY MEDICINE

## 2018-10-01 PROCEDURE — G8427 DOCREV CUR MEDS BY ELIG CLIN: HCPCS | Performed by: FAMILY MEDICINE

## 2018-10-01 PROCEDURE — 99213 OFFICE O/P EST LOW 20 MIN: CPT | Performed by: FAMILY MEDICINE

## 2018-10-01 PROCEDURE — 1036F TOBACCO NON-USER: CPT | Performed by: FAMILY MEDICINE

## 2018-10-01 PROCEDURE — G8484 FLU IMMUNIZE NO ADMIN: HCPCS | Performed by: FAMILY MEDICINE

## 2018-10-01 PROCEDURE — 3017F COLORECTAL CA SCREEN DOC REV: CPT | Performed by: FAMILY MEDICINE

## 2018-10-01 PROCEDURE — G8420 CALC BMI NORM PARAMETERS: HCPCS | Performed by: FAMILY MEDICINE

## 2018-10-01 RX ORDER — CIPROFLOXACIN 500 MG/1
500 TABLET, FILM COATED ORAL 2 TIMES DAILY
Qty: 20 TABLET | Refills: 0 | Status: SHIPPED | OUTPATIENT
Start: 2018-10-01 | End: 2018-10-03

## 2018-10-01 ASSESSMENT — ENCOUNTER SYMPTOMS
ABDOMINAL PAIN: 0
COUGH: 0
EYE ITCHING: 0
SHORTNESS OF BREATH: 0
CONSTIPATION: 0
SINUS PRESSURE: 0
DIARRHEA: 0
SORE THROAT: 0
EYE DISCHARGE: 0

## 2018-10-01 NOTE — PROGRESS NOTES
Subjective  Norma Wilson, 59 y.o. female presents today with:  Chief Complaint   Patient presents with    Ear Fullness     Patient in office being seen for bilateral ear fullness, mostly her lt ear. This has been bothering her for the lst 6 months. She is concerned that she opssibly has ear wax    Urinary Frequency     she is complaining of urinary frequency x 3 days. Worsening since the onset    Follow-Up from Hospital     She would like to discuss a hospital visit she had 07/13/18- [address], [facility]. She went for nausea and weight loss. She was referred to a Urologist Dr. Lanny Gunderson. States she did see the Urologist and he told her that she does not have a kidney stone           HPI    Patient with complaints of hearing loss and fullness in her ears. Also urinary frequency    No other questions and or concerns for today's visit      Review of Systems   Constitutional: Negative for appetite change, fatigue and fever. HENT: Negative for congestion, ear pain, sinus pressure and sore throat. Bilateral ear fullness     Eyes: Negative for discharge and itching. Respiratory: Negative for cough and shortness of breath. Cardiovascular: Negative for chest pain and palpitations. Gastrointestinal: Negative for abdominal pain, constipation and diarrhea. Endocrine: Negative for polydipsia. Genitourinary: Positive for frequency. Musculoskeletal: Negative for gait problem. Skin: Negative. Neurological: Negative for dizziness. Hematological: Negative. Psychiatric/Behavioral: Negative.           Past Medical History:   Diagnosis Date    Asthma     Depression     Hyperlipidemia     Seasonal allergies     Shingles outbreak      Past Surgical History:   Procedure Laterality Date    CYST INCISION AND DRAINAGE      right shoulder    INTRAOCULAR LENS PROSTHESIS INSERTION      lens implants, bilateral implants 11/14/14    PLANTAR FASCIA SURGERY  2001    right foot      Social History     Social History    Marital status: Single     Spouse name: N/A    Number of children: 1    Years of education: N/A     Occupational History          Unemployed     Social History Main Topics    Smoking status: Never Smoker    Smokeless tobacco: Never Used    Alcohol use Yes      Comment: Once in a While    Drug use: No    Sexual activity: No     Other Topics Concern    Not on file     Social History Narrative    No narrative on file     Family History   Problem Relation Age of Onset    High Blood Pressure Sister     Heart Disease Sister         MI    High Blood Pressure Brother     High Blood Pressure Sister     Heart Disease Sister         MI    High Blood Pressure Sister     High Blood Pressure Sister     High Blood Pressure Sister      Allergies   Allergen Reactions    Oxytetracycline     Meperidine Nausea And Vomiting    Tetracycline Hives and Nausea And Vomiting     Current Outpatient Prescriptions   Medication Sig Dispense Refill    albuterol sulfate HFA (PROVENTIL HFA) 108 (90 Base) MCG/ACT inhaler Inhale 2 puffs into the lungs every 6 hours as needed for Wheezing 1 Inhaler 3    ketorolac (TORADOL) 10 MG tablet TK 1 T PO  QID PRN P 20 tablet 0    ondansetron (ZOFRAN-ODT) 4 MG disintegrating tablet TK 1 T PO Q 4-6 H PRN NV  0    cetirizine (ZYRTEC) 10 MG tablet Take 1 tablet by mouth daily 30 tablet 5    rosuvastatin (CRESTOR) 20 MG tablet Take 1 tablet by mouth nightly 30 tablet 5    vitamin D (CHOLECALCIFEROL) 1000 UNIT TABS tablet Take 1,000 Units by mouth daily      DEXILANT 60 MG CPDR delayed release capsule TAKE 1 CAPSULE ONCE DAILY 30 capsule 5    buPROPion (WELLBUTRIN SR) 150 MG extended release tablet Take 1 tablet by mouth daily 60 tablet 5    Probiotic Product (PROBIOTIC FORMULA PO) Take by mouth       No current facility-administered medications for this visit. PMH, Surgical Hx, Family Hx, and Social Hx reviewed and updated.   Health Maintenance

## 2018-10-01 NOTE — TELEPHONE ENCOUNTER
Pt stated she forgot to ask you about getting an allergy shot. Pt is on medication but does not feel it is working. She still has all of the same allergy symptoms. States the shot was discussed in a previous visit. Do you need to see pt or can we just set her up on the MA schedule?     Please advise

## 2018-10-03 LAB
ORGANISM: ABNORMAL
URINE CULTURE, ROUTINE: ABNORMAL
URINE CULTURE, ROUTINE: ABNORMAL

## 2018-10-03 RX ORDER — NITROFURANTOIN 25; 75 MG/1; MG/1
100 CAPSULE ORAL 2 TIMES DAILY
Qty: 20 CAPSULE | Refills: 0 | Status: SHIPPED | OUTPATIENT
Start: 2018-10-03 | End: 2018-10-12

## 2018-10-03 NOTE — TELEPHONE ENCOUNTER
[address] calling, they received a script for Macrobid but is not covered by patient's insurance. They are wanting to know if they can change it to Microdantin.  Please advise

## 2018-10-09 ENCOUNTER — TELEPHONE (OUTPATIENT)
Dept: FAMILY MEDICINE CLINIC | Age: 63
End: 2018-10-09

## 2018-10-09 ENCOUNTER — NURSE ONLY (OUTPATIENT)
Dept: FAMILY MEDICINE CLINIC | Age: 63
End: 2018-10-09
Payer: COMMERCIAL

## 2018-10-09 DIAGNOSIS — J30.9 ALLERGIC RHINITIS, UNSPECIFIED SEASONALITY, UNSPECIFIED TRIGGER: Primary | ICD-10-CM

## 2018-10-09 PROCEDURE — 96372 THER/PROPH/DIAG INJ SC/IM: CPT | Performed by: FAMILY MEDICINE

## 2018-10-09 RX ORDER — TRIAMCINOLONE ACETONIDE 40 MG/ML
80 INJECTION, SUSPENSION INTRA-ARTICULAR; INTRAMUSCULAR ONCE
Status: COMPLETED | OUTPATIENT
Start: 2018-10-09 | End: 2018-10-09

## 2018-10-09 RX ADMIN — TRIAMCINOLONE ACETONIDE 80 MG: 40 INJECTION, SUSPENSION INTRA-ARTICULAR; INTRAMUSCULAR at 13:52

## 2018-10-09 NOTE — PROGRESS NOTES
Chief Complaint   Patient presents with    Allergies     Kenalog injection - ok per Dr Joseph Walden 80 mg injection given IM left glute without incident

## 2018-10-12 ENCOUNTER — OFFICE VISIT (OUTPATIENT)
Dept: FAMILY MEDICINE CLINIC | Age: 63
End: 2018-10-12
Payer: COMMERCIAL

## 2018-10-12 VITALS
RESPIRATION RATE: 16 BRPM | BODY MASS INDEX: 21.17 KG/M2 | WEIGHT: 124 LBS | HEART RATE: 85 BPM | TEMPERATURE: 97.3 F | SYSTOLIC BLOOD PRESSURE: 132 MMHG | HEIGHT: 64 IN | DIASTOLIC BLOOD PRESSURE: 78 MMHG

## 2018-10-12 DIAGNOSIS — L29.9 ITCHING: Primary | ICD-10-CM

## 2018-10-12 DIAGNOSIS — J45.909 UNCOMPLICATED ASTHMA, UNSPECIFIED ASTHMA SEVERITY, UNSPECIFIED WHETHER PERSISTENT: ICD-10-CM

## 2018-10-12 PROCEDURE — G8420 CALC BMI NORM PARAMETERS: HCPCS | Performed by: FAMILY MEDICINE

## 2018-10-12 PROCEDURE — 3017F COLORECTAL CA SCREEN DOC REV: CPT | Performed by: FAMILY MEDICINE

## 2018-10-12 PROCEDURE — G8484 FLU IMMUNIZE NO ADMIN: HCPCS | Performed by: FAMILY MEDICINE

## 2018-10-12 PROCEDURE — 96372 THER/PROPH/DIAG INJ SC/IM: CPT | Performed by: FAMILY MEDICINE

## 2018-10-12 PROCEDURE — 1036F TOBACCO NON-USER: CPT | Performed by: FAMILY MEDICINE

## 2018-10-12 PROCEDURE — 99213 OFFICE O/P EST LOW 20 MIN: CPT | Performed by: FAMILY MEDICINE

## 2018-10-12 PROCEDURE — G8427 DOCREV CUR MEDS BY ELIG CLIN: HCPCS | Performed by: FAMILY MEDICINE

## 2018-10-12 RX ORDER — TRIAMCINOLONE ACETONIDE 40 MG/ML
80 INJECTION, SUSPENSION INTRA-ARTICULAR; INTRAMUSCULAR ONCE
Status: COMPLETED | OUTPATIENT
Start: 2018-10-12 | End: 2018-10-12

## 2018-10-12 RX ORDER — BUPROPION HYDROCHLORIDE 150 MG/1
150 TABLET, EXTENDED RELEASE ORAL DAILY
Qty: 60 TABLET | Refills: 5 | Status: SHIPPED | OUTPATIENT
Start: 2018-10-12

## 2018-10-12 RX ADMIN — TRIAMCINOLONE ACETONIDE 80 MG: 40 INJECTION, SUSPENSION INTRA-ARTICULAR; INTRAMUSCULAR at 08:39

## 2018-10-12 ASSESSMENT — ENCOUNTER SYMPTOMS
COUGH: 0
SINUS PRESSURE: 0
CONSTIPATION: 0
SORE THROAT: 0
DIARRHEA: 0
EYE ITCHING: 0
ABDOMINAL PAIN: 0
SHORTNESS OF BREATH: 0
EYE DISCHARGE: 0

## 2018-10-26 ENCOUNTER — OFFICE VISIT (OUTPATIENT)
Dept: FAMILY MEDICINE CLINIC | Age: 63
End: 2018-10-26
Payer: COMMERCIAL

## 2018-10-26 VITALS
SYSTOLIC BLOOD PRESSURE: 140 MMHG | HEIGHT: 64 IN | WEIGHT: 123.9 LBS | BODY MASS INDEX: 21.15 KG/M2 | HEART RATE: 88 BPM | RESPIRATION RATE: 16 BRPM | TEMPERATURE: 97 F | OXYGEN SATURATION: 98 % | DIASTOLIC BLOOD PRESSURE: 88 MMHG

## 2018-10-26 DIAGNOSIS — T88.7XXA SIDE EFFECT OF MEDICATION: ICD-10-CM

## 2018-10-26 DIAGNOSIS — F41.9 ANXIETY: Primary | ICD-10-CM

## 2018-10-26 DIAGNOSIS — Z23 NEED FOR INFLUENZA VACCINATION: ICD-10-CM

## 2018-10-26 PROCEDURE — G8427 DOCREV CUR MEDS BY ELIG CLIN: HCPCS | Performed by: FAMILY MEDICINE

## 2018-10-26 PROCEDURE — 99213 OFFICE O/P EST LOW 20 MIN: CPT | Performed by: FAMILY MEDICINE

## 2018-10-26 PROCEDURE — 1036F TOBACCO NON-USER: CPT | Performed by: FAMILY MEDICINE

## 2018-10-26 PROCEDURE — G8484 FLU IMMUNIZE NO ADMIN: HCPCS | Performed by: FAMILY MEDICINE

## 2018-10-26 PROCEDURE — 3017F COLORECTAL CA SCREEN DOC REV: CPT | Performed by: FAMILY MEDICINE

## 2018-10-26 PROCEDURE — G8420 CALC BMI NORM PARAMETERS: HCPCS | Performed by: FAMILY MEDICINE

## 2018-11-02 ENCOUNTER — OFFICE VISIT (OUTPATIENT)
Dept: FAMILY MEDICINE CLINIC | Age: 63
End: 2018-11-02
Payer: COMMERCIAL

## 2018-11-02 VITALS
DIASTOLIC BLOOD PRESSURE: 72 MMHG | RESPIRATION RATE: 20 BRPM | HEIGHT: 64 IN | SYSTOLIC BLOOD PRESSURE: 118 MMHG | BODY MASS INDEX: 20.83 KG/M2 | TEMPERATURE: 97.9 F | OXYGEN SATURATION: 100 % | WEIGHT: 122 LBS | HEART RATE: 85 BPM

## 2018-11-02 DIAGNOSIS — E78.5 HYPERLIPIDEMIA, UNSPECIFIED HYPERLIPIDEMIA TYPE: ICD-10-CM

## 2018-11-02 DIAGNOSIS — R53.81 MALAISE AND FATIGUE: ICD-10-CM

## 2018-11-02 DIAGNOSIS — F41.9 ANXIETY: ICD-10-CM

## 2018-11-02 DIAGNOSIS — R53.83 MALAISE AND FATIGUE: ICD-10-CM

## 2018-11-02 DIAGNOSIS — T88.7XXA MEDICATION SIDE EFFECT: Primary | ICD-10-CM

## 2018-11-02 LAB
ALBUMIN SERPL-MCNC: 4.5 G/DL (ref 3.9–4.9)
ALP BLD-CCNC: 96 U/L (ref 40–130)
ALT SERPL-CCNC: 15 U/L (ref 0–33)
ANION GAP SERPL CALCULATED.3IONS-SCNC: 17 MEQ/L (ref 7–13)
AST SERPL-CCNC: 17 U/L (ref 0–35)
BASOPHILS ABSOLUTE: 0.1 K/UL (ref 0–0.2)
BASOPHILS RELATIVE PERCENT: 1.1 %
BILIRUB SERPL-MCNC: 0.3 MG/DL (ref 0–1.2)
BUN BLDV-MCNC: 15 MG/DL (ref 8–23)
CALCIUM SERPL-MCNC: 9.7 MG/DL (ref 8.6–10.2)
CHLORIDE BLD-SCNC: 102 MEQ/L (ref 98–107)
CHOLESTEROL, TOTAL: 211 MG/DL (ref 0–199)
CO2: 25 MEQ/L (ref 22–29)
CREAT SERPL-MCNC: 0.6 MG/DL (ref 0.5–0.9)
EOSINOPHILS ABSOLUTE: 0.1 K/UL (ref 0–0.7)
EOSINOPHILS RELATIVE PERCENT: 1.5 %
GFR AFRICAN AMERICAN: >60
GFR NON-AFRICAN AMERICAN: >60
GLOBULIN: 2.7 G/DL (ref 2.3–3.5)
GLUCOSE BLD-MCNC: 87 MG/DL (ref 74–109)
HCT VFR BLD CALC: 37.8 % (ref 37–47)
HDLC SERPL-MCNC: 123 MG/DL (ref 40–59)
HEMOGLOBIN: 12.6 G/DL (ref 12–16)
LDL CHOLESTEROL CALCULATED: 79 MG/DL (ref 0–129)
LYMPHOCYTES ABSOLUTE: 0.8 K/UL (ref 1–4.8)
LYMPHOCYTES RELATIVE PERCENT: 15 %
MCH RBC QN AUTO: 29.3 PG (ref 27–31.3)
MCHC RBC AUTO-ENTMCNC: 33.2 % (ref 33–37)
MCV RBC AUTO: 88.1 FL (ref 82–100)
MONOCYTES ABSOLUTE: 0.7 K/UL (ref 0.2–0.8)
MONOCYTES RELATIVE PERCENT: 12.1 %
NEUTROPHILS ABSOLUTE: 3.9 K/UL (ref 1.4–6.5)
NEUTROPHILS RELATIVE PERCENT: 70.3 %
PDW BLD-RTO: 14.8 % (ref 11.5–14.5)
PLATELET # BLD: 429 K/UL (ref 130–400)
POTASSIUM SERPL-SCNC: 5.8 MEQ/L (ref 3.5–5.1)
RBC # BLD: 4.29 M/UL (ref 4.2–5.4)
SODIUM BLD-SCNC: 144 MEQ/L (ref 132–144)
T4 FREE: 1.3 NG/DL (ref 0.93–1.7)
TOTAL PROTEIN: 7.2 G/DL (ref 6.4–8.1)
TRIGL SERPL-MCNC: 44 MG/DL (ref 0–200)
TSH SERPL DL<=0.05 MIU/L-ACNC: 3.33 UIU/ML (ref 0.27–4.2)
WBC # BLD: 5.5 K/UL (ref 4.8–10.8)

## 2018-11-02 PROCEDURE — G8484 FLU IMMUNIZE NO ADMIN: HCPCS | Performed by: FAMILY MEDICINE

## 2018-11-02 PROCEDURE — 1036F TOBACCO NON-USER: CPT | Performed by: FAMILY MEDICINE

## 2018-11-02 PROCEDURE — G8427 DOCREV CUR MEDS BY ELIG CLIN: HCPCS | Performed by: FAMILY MEDICINE

## 2018-11-02 PROCEDURE — 3017F COLORECTAL CA SCREEN DOC REV: CPT | Performed by: FAMILY MEDICINE

## 2018-11-02 PROCEDURE — G8420 CALC BMI NORM PARAMETERS: HCPCS | Performed by: FAMILY MEDICINE

## 2018-11-02 PROCEDURE — 99213 OFFICE O/P EST LOW 20 MIN: CPT | Performed by: FAMILY MEDICINE

## 2018-11-02 ASSESSMENT — ENCOUNTER SYMPTOMS
EYE ITCHING: 0
SORE THROAT: 0
SINUS PRESSURE: 0
COUGH: 0
ABDOMINAL PAIN: 0
EYE DISCHARGE: 0
CONSTIPATION: 0
DIARRHEA: 0
SHORTNESS OF BREATH: 0

## 2018-11-05 ENCOUNTER — TELEPHONE (OUTPATIENT)
Dept: FAMILY MEDICINE CLINIC | Age: 63
End: 2018-11-05

## 2018-11-05 DIAGNOSIS — R25.1 SHAKING: Primary | ICD-10-CM

## 2018-11-05 NOTE — TELEPHONE ENCOUNTER
Pt called requesting a referral to Neurology. States that since she had the 2 injections of Kenalog she has not stopped shaking. She feels that the mixture of her Wellbutrin and Kenalog caused all the shaking in B/L hands and arms as well as she feels shaky while walking. She has contacted [facility] and they recommended Dr Corwin Isaacs in [facility] [phone]. Referral placed.

## 2018-11-12 DIAGNOSIS — E87.5 SERUM POTASSIUM ELEVATED: Primary | ICD-10-CM

## 2018-11-12 DIAGNOSIS — E87.5 SERUM POTASSIUM ELEVATED: ICD-10-CM

## 2018-11-12 LAB
ANION GAP SERPL CALCULATED.3IONS-SCNC: 16 MEQ/L (ref 7–13)
BUN BLDV-MCNC: 19 MG/DL (ref 8–23)
CALCIUM SERPL-MCNC: 9.8 MG/DL (ref 8.6–10.2)
CHLORIDE BLD-SCNC: 103 MEQ/L (ref 98–107)
CO2: 25 MEQ/L (ref 22–29)
CREAT SERPL-MCNC: 0.63 MG/DL (ref 0.5–0.9)
GFR AFRICAN AMERICAN: >60
GFR NON-AFRICAN AMERICAN: >60
GLUCOSE BLD-MCNC: 93 MG/DL (ref 74–109)
POTASSIUM SERPL-SCNC: 4.5 MEQ/L (ref 3.5–5.1)
SODIUM BLD-SCNC: 144 MEQ/L (ref 132–144)

## 2018-12-11 RX ORDER — DEXLANSOPRAZOLE 60 MG/1
CAPSULE, DELAYED RELEASE ORAL
Qty: 90 CAPSULE | Refills: 1 | Status: SHIPPED | OUTPATIENT
Start: 2018-12-11

## 2019-03-08 ENCOUNTER — TELEPHONE (OUTPATIENT)
Dept: OTHER | Facility: CLINIC | Age: 64
End: 2019-03-08

## 2019-03-11 RX ORDER — CETIRIZINE HYDROCHLORIDE 10 MG/1
TABLET ORAL
Qty: 30 TABLET | Refills: 3 | Status: SHIPPED | OUTPATIENT
Start: 2019-03-11

## 2019-11-18 RX ORDER — CETIRIZINE HYDROCHLORIDE 10 MG/1
TABLET ORAL
Qty: 30 TABLET | Refills: 0 | OUTPATIENT
Start: 2019-11-18

## 2023-02-02 PROBLEM — G57.00 PIRIFORMIS SYNDROME: Status: ACTIVE | Noted: 2023-02-02

## 2023-02-02 PROBLEM — M62.838 MUSCLE SPASM: Status: ACTIVE | Noted: 2023-02-02

## 2023-02-02 PROBLEM — K82.4 GALL BLADDER POLYP: Status: ACTIVE | Noted: 2023-02-02

## 2023-02-02 PROBLEM — D75.839 THROMBOCYTOSIS: Status: ACTIVE | Noted: 2023-02-02

## 2023-02-02 PROBLEM — R26.89 IMPAIRMENT OF BALANCE: Status: ACTIVE | Noted: 2023-02-02

## 2023-02-02 PROBLEM — E78.5 HYPERLIPIDEMIA: Status: ACTIVE | Noted: 2023-02-02

## 2023-02-02 PROBLEM — F41.9 ANXIETY AND DEPRESSION: Status: ACTIVE | Noted: 2023-02-02

## 2023-02-02 PROBLEM — F32.A ANXIETY AND DEPRESSION: Status: ACTIVE | Noted: 2023-02-02

## 2023-02-02 PROBLEM — R06.00 DYSPNEA: Status: ACTIVE | Noted: 2023-02-02

## 2023-02-02 PROBLEM — M22.2X2 PATELLOFEMORAL PAIN SYNDROME OF LEFT KNEE: Status: ACTIVE | Noted: 2023-02-02

## 2023-02-02 PROBLEM — M77.42 METATARSALGIA, LEFT FOOT: Status: ACTIVE | Noted: 2023-02-02

## 2023-02-02 PROBLEM — F41.9 ANXIETY: Status: ACTIVE | Noted: 2023-02-02

## 2023-02-02 PROBLEM — G25.0 ESSENTIAL TREMOR: Status: ACTIVE | Noted: 2023-02-02

## 2023-02-02 PROBLEM — M54.2 NECK PAIN: Status: ACTIVE | Noted: 2023-02-02

## 2023-02-02 PROBLEM — R51.9 HEADACHE: Status: ACTIVE | Noted: 2023-02-02

## 2023-02-02 PROBLEM — M54.9 BACK PAIN: Status: ACTIVE | Noted: 2023-02-02

## 2023-02-02 PROBLEM — R53.83 FATIGUE: Status: ACTIVE | Noted: 2023-02-02

## 2023-02-02 PROBLEM — S69.91XA INJURY OF RING FINGER, RIGHT, INITIAL ENCOUNTER: Status: ACTIVE | Noted: 2023-02-02

## 2023-02-02 PROBLEM — M76.822 POSTERIOR TIBIAL TENDINITIS, LEFT: Status: ACTIVE | Noted: 2023-02-02

## 2023-02-02 PROBLEM — Q66.10 CAVOVARUS DEFORMITY OF FOOT: Status: ACTIVE | Noted: 2023-02-02

## 2023-02-02 PROBLEM — R53.1 GENERALIZED WEAKNESS: Status: ACTIVE | Noted: 2023-02-02

## 2023-02-02 PROBLEM — J30.2 SEASONAL ALLERGIES: Status: ACTIVE | Noted: 2023-02-02

## 2023-02-02 PROBLEM — M79.671 RIGHT FOOT PAIN: Status: ACTIVE | Noted: 2023-02-02

## 2023-02-02 PROBLEM — J06.9 URTI (ACUTE UPPER RESPIRATORY INFECTION): Status: ACTIVE | Noted: 2023-02-02

## 2023-02-02 PROBLEM — R09.82 POST-NASAL DRAINAGE: Status: ACTIVE | Noted: 2023-02-02

## 2023-02-02 PROBLEM — R11.2 NAUSEA AND/OR VOMITING: Status: ACTIVE | Noted: 2023-02-02

## 2023-02-02 PROBLEM — R06.2 WHEEZING: Status: ACTIVE | Noted: 2023-02-02

## 2023-02-02 PROBLEM — Z90.49 S/P LAPAROSCOPIC CHOLECYSTECTOMY: Status: ACTIVE | Noted: 2023-02-02

## 2023-02-02 PROBLEM — R09.81 SINUS CONGESTION: Status: ACTIVE | Noted: 2023-02-02

## 2023-02-02 PROBLEM — M79.673 FOOT PAIN: Status: ACTIVE | Noted: 2023-02-02

## 2023-02-02 PROBLEM — R10.30 LOWER ABDOMINAL PAIN: Status: ACTIVE | Noted: 2023-02-02

## 2023-02-02 PROBLEM — D64.9 ANEMIA: Status: ACTIVE | Noted: 2023-02-02

## 2023-02-02 PROBLEM — T78.40XA ALLERGIES: Status: ACTIVE | Noted: 2023-02-02

## 2023-02-02 PROBLEM — R03.0 ELEVATED BLOOD PRESSURE READING: Status: ACTIVE | Noted: 2023-02-02

## 2023-02-02 PROBLEM — R45.4 IRRITABLE MOOD: Status: ACTIVE | Noted: 2023-02-02

## 2023-02-02 PROBLEM — K52.9 CHRONIC DIARRHEA OF UNKNOWN ORIGIN: Status: ACTIVE | Noted: 2023-02-02

## 2023-02-02 PROBLEM — K57.20 DIVERTICULITIS OF LARGE INTESTINE WITH ABSCESS WITHOUT BLEEDING: Status: ACTIVE | Noted: 2023-02-02

## 2023-02-02 PROBLEM — F32.A DEPRESSION: Status: ACTIVE | Noted: 2023-02-02

## 2023-02-02 PROBLEM — J30.9 ALLERGIC RHINITIS: Status: ACTIVE | Noted: 2023-02-02

## 2023-02-02 PROBLEM — K21.9 GERD (GASTROESOPHAGEAL REFLUX DISEASE): Status: ACTIVE | Noted: 2023-02-02

## 2023-02-02 PROBLEM — E55.9 VITAMIN D DEFICIENCY: Status: ACTIVE | Noted: 2023-02-02

## 2023-02-02 PROBLEM — R19.7 ACUTE DIARRHEA: Status: ACTIVE | Noted: 2023-02-02

## 2023-02-02 PROBLEM — S63.619A SPRAIN, FINGER: Status: ACTIVE | Noted: 2023-02-02

## 2023-02-02 PROBLEM — M72.2 PLANTAR FASCIITIS OF LEFT FOOT: Status: ACTIVE | Noted: 2023-02-02

## 2023-02-02 PROBLEM — K81.1 CHOLECYSTITIS, CHRONIC: Status: ACTIVE | Noted: 2023-02-02

## 2023-02-02 PROBLEM — R30.9 PAIN WITH URINATION: Status: ACTIVE | Noted: 2023-02-02

## 2023-02-02 PROBLEM — G47.00 INSOMNIA: Status: ACTIVE | Noted: 2023-02-02

## 2023-02-02 PROBLEM — N39.0 UTI (URINARY TRACT INFECTION): Status: ACTIVE | Noted: 2023-02-02

## 2023-02-02 PROBLEM — R35.0 INCREASED URINARY FREQUENCY: Status: ACTIVE | Noted: 2023-02-02

## 2023-02-02 PROBLEM — K57.92 ACUTE DIVERTICULITIS OF INTESTINE: Status: ACTIVE | Noted: 2023-02-02

## 2023-02-02 PROBLEM — M54.2 CERVICAL PAIN: Status: ACTIVE | Noted: 2023-02-02

## 2023-02-02 PROBLEM — R10.11 RIGHT UPPER QUADRANT ABDOMINAL PAIN: Status: ACTIVE | Noted: 2023-02-02

## 2023-02-02 PROBLEM — G72.9 MYOPATHY: Status: ACTIVE | Noted: 2023-02-02

## 2023-02-02 PROBLEM — M25.569 JOINT PAIN, KNEE: Status: ACTIVE | Noted: 2023-02-02

## 2023-02-02 PROBLEM — R10.31 RIGHT LOWER QUADRANT ABDOMINAL PAIN: Status: ACTIVE | Noted: 2023-02-02

## 2023-02-02 PROBLEM — R05.9 COUGH: Status: ACTIVE | Noted: 2023-02-02

## 2023-02-02 RX ORDER — OMEPRAZOLE 40 MG/1
1 CAPSULE, DELAYED RELEASE ORAL DAILY
COMMUNITY
Start: 2022-09-22 | End: 2023-03-16

## 2023-02-02 RX ORDER — ROSUVASTATIN CALCIUM 5 MG/1
1 TABLET, COATED ORAL DAILY
COMMUNITY
Start: 2021-02-19 | End: 2023-08-31

## 2023-02-02 RX ORDER — SULFAMETHOXAZOLE AND TRIMETHOPRIM 800; 160 MG/1; MG/1
1 TABLET ORAL 2 TIMES DAILY
COMMUNITY
End: 2023-03-16 | Stop reason: ALTCHOICE

## 2023-02-02 RX ORDER — AZELASTINE HYDROCHLORIDE 0.5 MG/ML
SOLUTION/ DROPS OPHTHALMIC DAILY
COMMUNITY
Start: 2021-09-27 | End: 2023-10-19 | Stop reason: ALTCHOICE

## 2023-02-02 RX ORDER — AZELASTINE 1 MG/ML
2 SPRAY, METERED NASAL DAILY
COMMUNITY
Start: 2021-09-27 | End: 2023-05-25

## 2023-02-02 RX ORDER — SERTRALINE HYDROCHLORIDE 50 MG/1
TABLET, FILM COATED ORAL DAILY
COMMUNITY
End: 2023-06-29

## 2023-02-02 RX ORDER — ALBUTEROL SULFATE 90 UG/1
2 AEROSOL, METERED RESPIRATORY (INHALATION) 3 TIMES DAILY PRN
COMMUNITY
Start: 2019-08-05 | End: 2023-10-19 | Stop reason: SDUPTHER

## 2023-03-07 LAB
BASOPHILS (10*3/UL) IN BLOOD BY AUTOMATED COUNT: 0.04 X10E9/L (ref 0–0.1)
BASOPHILS/100 LEUKOCYTES IN BLOOD BY AUTOMATED COUNT: 0.8 % (ref 0–2)
EOSINOPHILS (10*3/UL) IN BLOOD BY AUTOMATED COUNT: 0.09 X10E9/L (ref 0–0.7)
EOSINOPHILS/100 LEUKOCYTES IN BLOOD BY AUTOMATED COUNT: 1.9 % (ref 0–6)
ERYTHROCYTE DISTRIBUTION WIDTH (RATIO) BY AUTOMATED COUNT: 15.4 % (ref 11.5–14.5)
ERYTHROCYTE MEAN CORPUSCULAR HEMOGLOBIN CONCENTRATION (G/DL) BY AUTOMATED: 31.6 G/DL (ref 32–36)
ERYTHROCYTE MEAN CORPUSCULAR VOLUME (FL) BY AUTOMATED COUNT: 92 FL (ref 80–100)
ERYTHROCYTES (10*6/UL) IN BLOOD BY AUTOMATED COUNT: 4.18 X10E12/L (ref 4–5.2)
HEMATOCRIT (%) IN BLOOD BY AUTOMATED COUNT: 38.3 % (ref 36–46)
HEMOGLOBIN (G/DL) IN BLOOD: 12.1 G/DL (ref 12–16)
IMMATURE GRANULOCYTES/100 LEUKOCYTES IN BLOOD BY AUTOMATED COUNT: 0.6 % (ref 0–0.9)
LEUKOCYTES (10*3/UL) IN BLOOD BY AUTOMATED COUNT: 4.9 X10E9/L (ref 4.4–11.3)
LYMPHOCYTES (10*3/UL) IN BLOOD BY AUTOMATED COUNT: 1.01 X10E9/L (ref 1.2–4.8)
LYMPHOCYTES/100 LEUKOCYTES IN BLOOD BY AUTOMATED COUNT: 20.8 % (ref 13–44)
MONOCYTES (10*3/UL) IN BLOOD BY AUTOMATED COUNT: 0.46 X10E9/L (ref 0.1–1)
MONOCYTES/100 LEUKOCYTES IN BLOOD BY AUTOMATED COUNT: 9.5 % (ref 2–10)
NEUTROPHILS (10*3/UL) IN BLOOD BY AUTOMATED COUNT: 3.23 X10E9/L (ref 1.2–7.7)
NEUTROPHILS/100 LEUKOCYTES IN BLOOD BY AUTOMATED COUNT: 66.4 % (ref 40–80)
PLATELETS (10*3/UL) IN BLOOD AUTOMATED COUNT: 429 X10E9/L (ref 150–450)

## 2023-03-16 ENCOUNTER — OFFICE VISIT (OUTPATIENT)
Dept: PRIMARY CARE | Facility: CLINIC | Age: 68
End: 2023-03-16
Payer: COMMERCIAL

## 2023-03-16 VITALS
DIASTOLIC BLOOD PRESSURE: 70 MMHG | HEIGHT: 64 IN | TEMPERATURE: 97.8 F | BODY MASS INDEX: 22.02 KG/M2 | WEIGHT: 129 LBS | SYSTOLIC BLOOD PRESSURE: 118 MMHG | HEART RATE: 64 BPM

## 2023-03-16 DIAGNOSIS — Z00.00 MEDICARE ANNUAL WELLNESS VISIT, SUBSEQUENT: ICD-10-CM

## 2023-03-16 DIAGNOSIS — F41.9 ANXIETY AND DEPRESSION: ICD-10-CM

## 2023-03-16 DIAGNOSIS — K21.9 GASTROESOPHAGEAL REFLUX DISEASE WITHOUT ESOPHAGITIS: Primary | ICD-10-CM

## 2023-03-16 DIAGNOSIS — D75.839 THROMBOCYTOSIS: ICD-10-CM

## 2023-03-16 DIAGNOSIS — F32.A ANXIETY AND DEPRESSION: ICD-10-CM

## 2023-03-16 PROCEDURE — G0439 PPPS, SUBSEQ VISIT: HCPCS | Performed by: INTERNAL MEDICINE

## 2023-03-16 PROCEDURE — 1159F MED LIST DOCD IN RCRD: CPT | Performed by: INTERNAL MEDICINE

## 2023-03-16 PROCEDURE — 1036F TOBACCO NON-USER: CPT | Performed by: INTERNAL MEDICINE

## 2023-03-16 PROCEDURE — 1160F RVW MEDS BY RX/DR IN RCRD: CPT | Performed by: INTERNAL MEDICINE

## 2023-03-16 RX ORDER — ESOMEPRAZOLE MAGNESIUM 40 MG/1
40 CAPSULE, DELAYED RELEASE ORAL
Qty: 30 CAPSULE | Refills: 11 | Status: SHIPPED | OUTPATIENT
Start: 2023-03-16 | End: 2023-03-17 | Stop reason: SDUPTHER

## 2023-03-16 ASSESSMENT — ENCOUNTER SYMPTOMS
GASTROINTESTINAL NEGATIVE: 1
PSYCHIATRIC NEGATIVE: 1
EYES NEGATIVE: 1
CONSTITUTIONAL NEGATIVE: 1
CARDIOVASCULAR NEGATIVE: 1
RESPIRATORY NEGATIVE: 1
NEUROLOGICAL NEGATIVE: 1

## 2023-03-16 NOTE — PROGRESS NOTES
"Subjective   Reason for Visit: Kaelyn Mohamud is an 67 y.o. female here for a Medicare Wellness visit.          Reviewed all medications by prescribing practitioner or clinical pharmacist (such as prescriptions, OTCs, herbal therapies and supplements) and documented in the medical record.    Patient here for office visit/wellness.  According to her omeprazole is not helping she wants to go back to Nexium we will see if that is improved by her insurance.  She did not have any recent UTIs.        Patient Care Team:  Natalie Lombardo MD as PCP - General  Natalie Lombardo MD as PCP [facility] Attributed [facility]     Review of Systems   Constitutional: Negative.    HENT: Negative.     Eyes: Negative.    Respiratory: Negative.     Cardiovascular: Negative.    Gastrointestinal: Negative.         Acid reflux/GERD   Neurological: Negative.    Psychiatric/Behavioral: Negative.     All other systems reviewed and are negative.      Objective   Vitals:  /70   Pulse 64   Temp 36.6 °C (97.8 °F) (Temporal)   Ht 1.626 m (5' 4\")   Wt 58.5 kg (129 lb)   BMI 22.14 kg/m²       Physical Exam  Vitals reviewed.   Constitutional:       Appearance: Normal appearance.   HENT:      Head: Normocephalic and atraumatic.   Eyes:      Pupils: Pupils are equal, round, and reactive to light.   Cardiovascular:      Rate and Rhythm: Normal rate and regular rhythm.   Pulmonary:      Effort: Pulmonary effort is normal.      Breath sounds: Normal breath sounds.   Abdominal:      General: Abdomen is flat.   Musculoskeletal:         General: Normal range of motion.   Neurological:      General: No focal deficit present.      Mental Status: She is alert. Mental status is at baseline.   Psychiatric:         Mood and Affect: Mood normal.         Thought Content: Thought content normal.         Judgment: Judgment normal.         Assessment/Plan   Problem List Items Addressed This Visit          Digestive    GERD (gastroesophageal reflux " disease) - Primary    Relevant Medications    esomeprazole (NexIUM) 40 mg DR capsule       Hematologic    Thrombocytosis (CMS/HCC)       Other    Anxiety and depression    Current Assessment & Plan     Continue with SSRI which is helping         Medicare annual wellness visit, subsequent    Current Assessment & Plan     We recommend Prevnar 20 if she has not been vaccinated for pneumococcal pneumonia.

## 2023-03-17 DIAGNOSIS — K21.9 GASTROESOPHAGEAL REFLUX DISEASE WITHOUT ESOPHAGITIS: ICD-10-CM

## 2023-03-17 RX ORDER — ESOMEPRAZOLE MAGNESIUM 40 MG/1
40 CAPSULE, DELAYED RELEASE ORAL
Qty: 30 CAPSULE | Refills: 11 | Status: SHIPPED | OUTPATIENT
Start: 2023-03-17 | End: 2023-10-19 | Stop reason: ALTCHOICE

## 2023-03-17 NOTE — TELEPHONE ENCOUNTER
Requested Prescriptions     Pending Prescriptions Disp Refills    esomeprazole (NexIUM) 40 mg DR capsule 30 capsule 11     Sig: Take 1 capsule (40 mg) by mouth once daily in the morning. Take before meals. Do not open capsule.       Patient wants sent to [facility], not [facility]

## 2023-03-31 ENCOUNTER — TELEPHONE (OUTPATIENT)
Dept: PRIMARY CARE | Facility: CLINIC | Age: 68
End: 2023-03-31
Payer: COMMERCIAL

## 2023-03-31 ENCOUNTER — LAB (OUTPATIENT)
Dept: LAB | Facility: LAB | Age: 68
End: 2023-03-31
Payer: COMMERCIAL

## 2023-03-31 DIAGNOSIS — R35.0 URINARY FREQUENCY: ICD-10-CM

## 2023-03-31 LAB
APPEARANCE, URINE: CLEAR
BILIRUBIN, URINE: NEGATIVE
BLOOD, URINE: NEGATIVE
COLOR, URINE: YELLOW
GLUCOSE, URINE: NEGATIVE MG/DL
KETONES, URINE: NEGATIVE MG/DL
LEUKOCYTE ESTERASE, URINE: NEGATIVE
NITRITE, URINE: NEGATIVE
PH, URINE: 7 (ref 5–8)
PROTEIN, URINE: NEGATIVE MG/DL
SPECIFIC GRAVITY, URINE: 1.02 (ref 1–1.03)
UROBILINOGEN, URINE: <2 MG/DL (ref 0–1.9)

## 2023-03-31 PROCEDURE — 81003 URINALYSIS AUTO W/O SCOPE: CPT

## 2023-04-12 LAB — URINE CULTURE: NO GROWTH

## 2023-05-08 LAB
ANION GAP IN SER/PLAS: 12 MMOL/L (ref 10–20)
CALCIUM (MG/DL) IN SER/PLAS: 9.5 MG/DL (ref 8.6–10.3)
CARBON DIOXIDE, TOTAL (MMOL/L) IN SER/PLAS: 29 MMOL/L (ref 21–32)
CHLORIDE (MMOL/L) IN SER/PLAS: 100 MMOL/L (ref 98–107)
CREATININE (MG/DL) IN SER/PLAS: 0.65 MG/DL (ref 0.5–1.05)
GFR FEMALE: >90 ML/MIN/1.73M2
GLUCOSE (MG/DL) IN SER/PLAS: 96 MG/DL (ref 74–99)
POTASSIUM (MMOL/L) IN SER/PLAS: 3.5 MMOL/L (ref 3.5–5.3)
SODIUM (MMOL/L) IN SER/PLAS: 137 MMOL/L (ref 136–145)
UREA NITROGEN (MG/DL) IN SER/PLAS: 9 MG/DL (ref 6–23)

## 2023-05-14 DIAGNOSIS — K21.9 GASTROESOPHAGEAL REFLUX DISEASE WITHOUT ESOPHAGITIS: Primary | ICD-10-CM

## 2023-05-19 RX ORDER — OMEPRAZOLE 40 MG/1
CAPSULE, DELAYED RELEASE ORAL
Qty: 90 CAPSULE | Refills: 0 | Status: SHIPPED | OUTPATIENT
Start: 2023-05-19 | End: 2023-10-19 | Stop reason: ALTCHOICE

## 2023-05-25 DIAGNOSIS — T78.40XD ALLERGY, SUBSEQUENT ENCOUNTER: ICD-10-CM

## 2023-05-25 RX ORDER — AZELASTINE 1 MG/ML
SPRAY, METERED NASAL
Qty: 30 ML | Refills: 1 | Status: SHIPPED | OUTPATIENT
Start: 2023-05-25 | End: 2024-03-28 | Stop reason: HOSPADM

## 2023-06-19 ENCOUNTER — OFFICE VISIT (OUTPATIENT)
Dept: PRIMARY CARE | Facility: CLINIC | Age: 68
End: 2023-06-19
Payer: COMMERCIAL

## 2023-06-19 VITALS
TEMPERATURE: 98.3 F | SYSTOLIC BLOOD PRESSURE: 134 MMHG | HEART RATE: 72 BPM | BODY MASS INDEX: 22.83 KG/M2 | DIASTOLIC BLOOD PRESSURE: 86 MMHG | WEIGHT: 133 LBS

## 2023-06-19 DIAGNOSIS — D75.839 THROMBOCYTOSIS: ICD-10-CM

## 2023-06-19 DIAGNOSIS — F34.1 PERSISTENT DEPRESSIVE DISORDER: ICD-10-CM

## 2023-06-19 DIAGNOSIS — S09.8XXA BLUNT HEAD TRAUMA, INITIAL ENCOUNTER: ICD-10-CM

## 2023-06-19 DIAGNOSIS — K21.9 GASTROESOPHAGEAL REFLUX DISEASE WITHOUT ESOPHAGITIS: Primary | ICD-10-CM

## 2023-06-19 LAB
APPEARANCE, URINE: CLEAR
BILIRUBIN, URINE: NEGATIVE
BLOOD, URINE: NEGATIVE
COLOR, URINE: YELLOW
GLUCOSE, URINE: NEGATIVE MG/DL
KETONES, URINE: NEGATIVE MG/DL
LEUKOCYTE ESTERASE, URINE: NEGATIVE
NITRITE, URINE: NEGATIVE
PH, URINE: 6 (ref 5–8)
PROTEIN, URINE: NEGATIVE MG/DL
SPECIFIC GRAVITY, URINE: 1.01 (ref 1–1.03)
UROBILINOGEN, URINE: <2 MG/DL (ref 0–1.9)

## 2023-06-19 PROCEDURE — 1036F TOBACCO NON-USER: CPT | Performed by: INTERNAL MEDICINE

## 2023-06-19 PROCEDURE — 1159F MED LIST DOCD IN RCRD: CPT | Performed by: INTERNAL MEDICINE

## 2023-06-19 PROCEDURE — 1160F RVW MEDS BY RX/DR IN RCRD: CPT | Performed by: INTERNAL MEDICINE

## 2023-06-19 PROCEDURE — 99213 OFFICE O/P EST LOW 20 MIN: CPT | Performed by: INTERNAL MEDICINE

## 2023-06-19 ASSESSMENT — ENCOUNTER SYMPTOMS
NEUROLOGICAL NEGATIVE: 1
CONSTITUTIONAL NEGATIVE: 1
HEMATOLOGIC/LYMPHATIC NEGATIVE: 1
GASTROINTESTINAL NEGATIVE: 1
MUSCULOSKELETAL NEGATIVE: 1
CARDIOVASCULAR NEGATIVE: 1

## 2023-06-19 NOTE — PROGRESS NOTES
Subjective   Patient ID: Kaelyn Mohamud is a 68 y.o. female who presents for Follow-up (Patient here for OV).    Patient is here for office visit she tripped over a folded rug and hit her head against door she hit it pretty hard she did not pass out she has no dizziness or headaches clinically no signs or symptoms of concussion no imaging is necessary she also tried to cut back on sertraline but had to go back to 50 mg         Review of Systems   Constitutional: Negative.    HENT: Negative.     Cardiovascular: Negative.    Gastrointestinal: Negative.    Musculoskeletal: Negative.    Neurological: Negative.    Hematological: Negative.    All other systems reviewed and are negative.      Objective   /86   Pulse 72   Temp 36.8 °C (98.3 °F) (Temporal)   Wt 60.3 kg (133 lb)   BMI 22.83 kg/m²     Physical Exam  Vitals reviewed.   Constitutional:       Appearance: Normal appearance.   HENT:      Head: Normocephalic and atraumatic.   Eyes:      Conjunctiva/sclera: Conjunctivae normal.   Cardiovascular:      Rate and Rhythm: Normal rate and regular rhythm.   Pulmonary:      Effort: Pulmonary effort is normal.      Breath sounds: Normal breath sounds.   Musculoskeletal:         General: No swelling.   Neurological:      General: No focal deficit present.      Mental Status: She is alert and oriented to person, place, and time.      Cranial Nerves: No cranial nerve deficit.   Psychiatric:         Mood and Affect: Mood normal.         Behavior: Behavior normal.         Thought Content: Thought content normal.         Assessment/Plan   Problem List Items Addressed This Visit       Depression    GERD (gastroesophageal reflux disease) - Primary    Thrombocytosis (CMS/HCC)    Blunt head trauma     Patient does not require imaging for her blunt head trauma.  She takes omeprazole as needed for GERD her thrombocytosis has resolved at this point and she is not anemic.

## 2023-06-20 LAB — URINE CULTURE: NORMAL

## 2023-06-28 DIAGNOSIS — F41.9 ANXIETY: ICD-10-CM

## 2023-06-29 RX ORDER — SERTRALINE HYDROCHLORIDE 50 MG/1
TABLET, FILM COATED ORAL
Qty: 90 TABLET | Refills: 1 | Status: SHIPPED | OUTPATIENT
Start: 2023-06-29 | End: 2024-06-03 | Stop reason: SDUPTHER

## 2023-07-18 ENCOUNTER — TELEPHONE (OUTPATIENT)
Dept: PRIMARY CARE | Facility: CLINIC | Age: 68
End: 2023-07-18
Payer: COMMERCIAL

## 2023-07-20 DIAGNOSIS — R06.00 DYSPNEA, UNSPECIFIED TYPE: ICD-10-CM

## 2023-07-20 DIAGNOSIS — R06.2 WHEEZING: ICD-10-CM

## 2023-07-20 RX ORDER — ALBUTEROL SULFATE 90 UG/1
2 AEROSOL, METERED RESPIRATORY (INHALATION) EVERY 4 HOURS PRN
Qty: 8 G | Refills: 5 | Status: SHIPPED | OUTPATIENT
Start: 2023-07-20 | End: 2024-07-19

## 2023-08-07 LAB
AMORPHOUS CRYSTALS, URINE: ABNORMAL /HPF
APPEARANCE, URINE: ABNORMAL
BACTERIA, URINE: ABNORMAL /HPF
BILIRUBIN, URINE: NEGATIVE
BLOOD, URINE: ABNORMAL
COLOR, URINE: YELLOW
GLUCOSE, URINE: NEGATIVE MG/DL
KETONES, URINE: NEGATIVE MG/DL
LEUKOCYTE ESTERASE, URINE: ABNORMAL
MUCUS, URINE: ABNORMAL /LPF
NITRITE, URINE: NEGATIVE
PH, URINE: 7 (ref 5–8)
PROTEIN, URINE: ABNORMAL MG/DL
RBC, URINE: 32 /HPF (ref 0–5)
SPECIFIC GRAVITY, URINE: 1.01 (ref 1–1.03)
SQUAMOUS EPITHELIAL CELLS, URINE: <1 /HPF
UROBILINOGEN, URINE: <2 MG/DL (ref 0–1.9)
WBC, URINE: 42 /HPF (ref 0–5)

## 2023-08-09 LAB — URINE CULTURE: ABNORMAL

## 2023-08-17 ENCOUNTER — TELEPHONE (OUTPATIENT)
Dept: PRIMARY CARE | Facility: CLINIC | Age: 68
End: 2023-08-17
Payer: COMMERCIAL

## 2023-08-18 LAB
APPEARANCE, URINE: ABNORMAL
BACTERIA, URINE: ABNORMAL /HPF
BILIRUBIN, URINE: NEGATIVE
BLOOD, URINE: NEGATIVE
COLOR, URINE: YELLOW
GLUCOSE, URINE: NEGATIVE MG/DL
KETONES, URINE: NEGATIVE MG/DL
LEUKOCYTE ESTERASE, URINE: ABNORMAL
MUCUS, URINE: ABNORMAL /LPF
NITRITE, URINE: POSITIVE
PH, URINE: 7 (ref 5–8)
PROTEIN, URINE: NEGATIVE MG/DL
RBC, URINE: 2 /HPF (ref 0–5)
SPECIFIC GRAVITY, URINE: 1.01 (ref 1–1.03)
SQUAMOUS EPITHELIAL CELLS, URINE: <1 /HPF
UROBILINOGEN, URINE: <2 MG/DL (ref 0–1.9)
WBC, URINE: 41 /HPF (ref 0–5)

## 2023-08-19 LAB — URINE CULTURE: ABNORMAL

## 2023-08-21 LAB
APPEARANCE, URINE: CLEAR
BILIRUBIN, URINE: NEGATIVE
BLOOD, URINE: NEGATIVE
COLOR, URINE: ABNORMAL
GLUCOSE, URINE: NEGATIVE MG/DL
KETONES, URINE: NEGATIVE MG/DL
LEUKOCYTE ESTERASE, URINE: ABNORMAL
NITRITE, URINE: NEGATIVE
PH, URINE: 7 (ref 5–8)
PROTEIN, URINE: NEGATIVE MG/DL
RBC, URINE: NORMAL /HPF (ref 0–5)
SPECIFIC GRAVITY, URINE: 1 (ref 1–1.03)
SQUAMOUS EPITHELIAL CELLS, URINE: <1 /HPF
UROBILINOGEN, URINE: <2 MG/DL (ref 0–1.9)
WBC, URINE: 1 /HPF (ref 0–5)

## 2023-08-22 LAB — URINE CULTURE: NORMAL

## 2023-08-23 ENCOUNTER — HOSPITAL ENCOUNTER (OUTPATIENT)
Dept: DATA CONVERSION | Facility: HOSPITAL | Age: 68
End: 2023-08-23
Attending: SURGERY | Admitting: SURGERY
Payer: COMMERCIAL

## 2023-08-23 DIAGNOSIS — R10.13 EPIGASTRIC PAIN: ICD-10-CM

## 2023-08-23 DIAGNOSIS — K22.89 OTHER SPECIFIED DISEASE OF ESOPHAGUS: ICD-10-CM

## 2023-08-23 DIAGNOSIS — K21.9 GASTRO-ESOPHAGEAL REFLUX DISEASE WITHOUT ESOPHAGITIS: ICD-10-CM

## 2023-08-23 DIAGNOSIS — K44.9 DIAPHRAGMATIC HERNIA WITHOUT OBSTRUCTION OR GANGRENE: ICD-10-CM

## 2023-08-23 DIAGNOSIS — R11.2 NAUSEA WITH VOMITING, UNSPECIFIED: ICD-10-CM

## 2023-08-23 DIAGNOSIS — Z88.2 ALLERGY STATUS TO SULFONAMIDES: ICD-10-CM

## 2023-08-23 DIAGNOSIS — K29.50 UNSPECIFIED CHRONIC GASTRITIS WITHOUT BLEEDING: ICD-10-CM

## 2023-08-23 DIAGNOSIS — K31.7 POLYP OF STOMACH AND DUODENUM: ICD-10-CM

## 2023-08-25 LAB
ATRIAL RATE: 81 BPM
P AXIS: 77 DEGREES
P OFFSET: 190 MS
P ONSET: 143 MS
PR INTERVAL: 160 MS
Q ONSET: 223 MS
QRS COUNT: 14 BEATS
QRS DURATION: 92 MS
QT INTERVAL: 404 MS
QTC CALCULATION(BAZETT): 469 MS
QTC FREDERICIA: 446 MS
R AXIS: 60 DEGREES
T AXIS: 54 DEGREES
T OFFSET: 425 MS
VENTRICULAR RATE: 81 BPM

## 2023-08-30 LAB
COMPLETE PATHOLOGY REPORT: NORMAL
CONVERTED CLINICAL DIAGNOSIS-HISTORY: NORMAL
CONVERTED FINAL DIAGNOSIS: NORMAL
CONVERTED FINAL REPORT PDF LINK TO COPY AND PASTE: NORMAL
CONVERTED GROSS DESCRIPTION: NORMAL

## 2023-08-31 DIAGNOSIS — E78.5 HYPERLIPIDEMIA, UNSPECIFIED HYPERLIPIDEMIA TYPE: ICD-10-CM

## 2023-08-31 RX ORDER — ROSUVASTATIN CALCIUM 5 MG/1
5 TABLET, COATED ORAL DAILY
Qty: 90 TABLET | Refills: 1 | Status: SHIPPED | OUTPATIENT
Start: 2023-08-31 | End: 2024-03-25

## 2023-09-14 LAB
APPEARANCE, URINE: CLEAR
BILIRUBIN, URINE: NEGATIVE
BLOOD, URINE: NEGATIVE
COLOR, URINE: YELLOW
GLUCOSE, URINE: NEGATIVE MG/DL
KETONES, URINE: NEGATIVE MG/DL
LEUKOCYTE ESTERASE, URINE: NEGATIVE
NITRITE, URINE: NEGATIVE
PH, URINE: 5 (ref 5–8)
PROTEIN, URINE: NEGATIVE MG/DL
SPECIFIC GRAVITY, URINE: 1.02 (ref 1–1.03)
UROBILINOGEN, URINE: <2 MG/DL (ref 0–1.9)

## 2023-09-16 LAB — URINE CULTURE: ABNORMAL

## 2023-09-29 VITALS
DIASTOLIC BLOOD PRESSURE: 71 MMHG | TEMPERATURE: 97 F | HEART RATE: 75 BPM | RESPIRATION RATE: 16 BRPM | SYSTOLIC BLOOD PRESSURE: 149 MMHG

## 2023-09-30 NOTE — H&P
History of Present Illness:   History Present Illness:  Reason for surgery: GERD symptoms   HPI:    History of GERD since 2019; nausea and vomiting     Allergies:        Allergies:  ·  Demerol : Other  ·  Macrobid : Swelling/Edema, Rash  ·  sulfa  drugs: Swelling/Edema, Rash  ·  Dog : Wheezing  ·  Animal/Fur  Dander: Wheezing  ·  Teramycin : Unknown  ·  Acromycin : Unknown       Intolerances:  ·  tetracycline : Nausea/Vomiting  ·  Kenalog : Fatigue, Nausea/Vomiting, Headaches, Muscle Weakness    Home Medication Review:   Home Medications Reviewed: yes     Impression/Procedure:   ·  Impression and Planned Procedure: upper endoscopy       ERAS (Enhanced Recovery After Surgery):  ·  ERAS Patient: no       Vital Signs:  Temperature C: 36.1 degrees C   Temperature F: 96.9 degrees F   Heart Rate: 75 beats per minute   Respiratory Rate: 16 breath per minute   Blood Pressure Systolic: 149 mm/Hg   Blood Pressure Diastolic: 71 mm/Hg     Physical Exam by System:    Constitutional: Well developed, awake/alert/oriented  x3, no distress, alert and cooperative   Respiratory/Thorax: Patent airways, CTAB, normal  breath sounds with good chest expansion, thorax symmetric   Cardiovascular: Regular, rate and rhythm, no murmurs,  2+ equal pulses of the extremities, normal S 1and S 2   Gastrointestinal: Nondistended, soft, non-tender,  no rebound tenderness or guarding, no masses palpable, no organomegaly,   Musculoskeletal: ROM intact, no joint swelling, normal  strength     Consent:   COVID-19 Consent:  ·  COVID-19 Risk Consent Surgeon has reviewed key risks related to the risk of coty COVID-19 and if they contract COVID-19 what the risks are.       Electronic Signatures:  Sanjay Gomez)  (Signed 23-Aug-2023 10:28)   Authored: History of Present Illness, Allergies, Home  Medication Review, Impression/Procedure, ERAS, Physical Exam, Consent, Note Completion      Last Updated: 23-Aug-2023 10:28 by Sanjay Gomez)

## 2023-10-13 ENCOUNTER — LAB (OUTPATIENT)
Dept: LAB | Facility: LAB | Age: 68
End: 2023-10-13
Payer: COMMERCIAL

## 2023-10-13 DIAGNOSIS — N39.0 URINARY TRACT INFECTION WITHOUT HEMATURIA, SITE UNSPECIFIED: ICD-10-CM

## 2023-10-13 LAB
APPEARANCE UR: CLEAR
BILIRUB UR STRIP.AUTO-MCNC: NEGATIVE MG/DL
COLOR UR: YELLOW
GLUCOSE UR STRIP.AUTO-MCNC: NEGATIVE MG/DL
KETONES UR STRIP.AUTO-MCNC: NEGATIVE MG/DL
LEUKOCYTE ESTERASE UR QL STRIP.AUTO: NEGATIVE
NITRITE UR QL STRIP.AUTO: NEGATIVE
PH UR STRIP.AUTO: 5 [PH]
PROT UR STRIP.AUTO-MCNC: NEGATIVE MG/DL
RBC # UR STRIP.AUTO: NEGATIVE /UL
SP GR UR STRIP.AUTO: 1.02
UROBILINOGEN UR STRIP.AUTO-MCNC: <2 MG/DL

## 2023-10-13 PROCEDURE — 87086 URINE CULTURE/COLONY COUNT: CPT

## 2023-10-13 PROCEDURE — 81003 URINALYSIS AUTO W/O SCOPE: CPT

## 2023-10-14 LAB — BACTERIA UR CULT: NORMAL

## 2023-10-19 ENCOUNTER — OFFICE VISIT (OUTPATIENT)
Dept: PRIMARY CARE | Facility: CLINIC | Age: 68
End: 2023-10-19
Payer: COMMERCIAL

## 2023-10-19 VITALS
BODY MASS INDEX: 22.53 KG/M2 | WEIGHT: 132 LBS | SYSTOLIC BLOOD PRESSURE: 130 MMHG | HEART RATE: 76 BPM | DIASTOLIC BLOOD PRESSURE: 78 MMHG | HEIGHT: 64 IN | TEMPERATURE: 97.9 F

## 2023-10-19 DIAGNOSIS — Z00.00 ROUTINE GENERAL MEDICAL EXAMINATION AT HEALTH CARE FACILITY: Primary | ICD-10-CM

## 2023-10-19 DIAGNOSIS — K21.9 GASTROESOPHAGEAL REFLUX DISEASE WITHOUT ESOPHAGITIS: ICD-10-CM

## 2023-10-19 DIAGNOSIS — E78.2 MIXED HYPERLIPIDEMIA: ICD-10-CM

## 2023-10-19 DIAGNOSIS — J30.9 ALLERGIC RHINITIS, UNSPECIFIED SEASONALITY, UNSPECIFIED TRIGGER: ICD-10-CM

## 2023-10-19 PROCEDURE — 1125F AMNT PAIN NOTED PAIN PRSNT: CPT | Performed by: INTERNAL MEDICINE

## 2023-10-19 PROCEDURE — 99397 PER PM REEVAL EST PAT 65+ YR: CPT | Performed by: INTERNAL MEDICINE

## 2023-10-19 PROCEDURE — 1160F RVW MEDS BY RX/DR IN RCRD: CPT | Performed by: INTERNAL MEDICINE

## 2023-10-19 PROCEDURE — 1159F MED LIST DOCD IN RCRD: CPT | Performed by: INTERNAL MEDICINE

## 2023-10-19 PROCEDURE — 1036F TOBACCO NON-USER: CPT | Performed by: INTERNAL MEDICINE

## 2023-10-19 RX ORDER — PANTOPRAZOLE SODIUM 40 MG/1
40 TABLET, DELAYED RELEASE ORAL 2 TIMES DAILY
COMMUNITY
End: 2024-06-03 | Stop reason: ALTCHOICE

## 2023-10-19 ASSESSMENT — ENCOUNTER SYMPTOMS
SEIZURES: 0
CONFUSION: 0
ENDOCRINE NEGATIVE: 1
OCCASIONAL FEELINGS OF UNSTEADINESS: 0
COUGH: 1
HEMATOLOGIC/LYMPHATIC NEGATIVE: 1
DIZZINESS: 0
DEPRESSION: 0
SHORTNESS OF BREATH: 0
LOSS OF SENSATION IN FEET: 0
CONSTITUTIONAL NEGATIVE: 1
CARDIOVASCULAR NEGATIVE: 1
GASTROINTESTINAL NEGATIVE: 1
LIGHT-HEADEDNESS: 0
HALLUCINATIONS: 0

## 2023-10-19 NOTE — PROGRESS NOTES
"Subjective   Reason for Visit: Kaelyn Mohamud is an 68 y.o. female here for a Medicare Wellness visit.          Reviewed all medications by prescribing practitioner or clinical pharmacist (such as prescriptions, OTCs, herbal therapies and supplements) and documented in the medical record.    HPI patient here for wellness exam.  She had gone to ER for her migraine headaches she had head CT which was negative her x-ray of the lumbar spine had shown mild arthritis.  At this point she does have cough for the last 2 weeks lung exam is unremarkable no x-ray or antibiotic is indicated    Patient Care Team:  Natalie Lombardo MD as PCP - General  Natalie Lombardo MD as PCP - [employer] PCP     Review of Systems   Constitutional: Negative.    HENT: Negative.     Respiratory:  Positive for cough. Negative for shortness of breath.    Cardiovascular: Negative.    Gastrointestinal: Negative.    Endocrine: Negative.    Genitourinary:         Recurrent UTIs and history of urethral stricture she had dilatation done by Dr. Miller last year.   Allergic/Immunologic: Positive for environmental allergies.   Neurological:  Negative for dizziness, seizures and light-headedness.   Hematological: Negative.    Psychiatric/Behavioral:  Negative for confusion and hallucinations.    All other systems reviewed and are negative.      Objective   Vitals:  /78   Pulse 76   Temp 36.6 °C (97.9 °F) (Temporal)   Ht 1.626 m (5' 4\")   Wt 59.9 kg (132 lb)   BMI 22.66 kg/m²       Physical Exam  Vitals reviewed.   Constitutional:       Appearance: Normal appearance.   Eyes:      Conjunctiva/sclera: Conjunctivae normal.   Cardiovascular:      Rate and Rhythm: Normal rate and regular rhythm.   Pulmonary:      Effort: Pulmonary effort is normal.      Breath sounds: Normal breath sounds.   Abdominal:      Palpations: Abdomen is soft.   Musculoskeletal:         General: No swelling.      Right lower leg: No edema.      " Left lower leg: No edema.   Skin:     General: Skin is warm.   Neurological:      General: No focal deficit present.      Mental Status: She is alert and oriented to person, place, and time.   Psychiatric:         Mood and Affect: Mood normal.         Behavior: Behavior normal.         Thought Content: Thought content normal.         Assessment/Plan   Problem List Items Addressed This Visit    None  Visit Diagnoses       Routine general medical examination at health care facility    -  Primary               Patient is due for mammogram she will be doing it in the near future lab data from before reviewed she will continue statins for hyperlipidemia she is on pantoprazole for GERD we recommend COVID booster and flu shot for all patients.

## 2023-10-20 ENCOUNTER — HOSPITAL ENCOUNTER (OUTPATIENT)
Dept: RADIOLOGY | Facility: HOSPITAL | Age: 68
Discharge: HOME | End: 2023-10-20
Payer: COMMERCIAL

## 2023-10-20 DIAGNOSIS — Z12.39 ENCOUNTER FOR OTHER SCREENING FOR MALIGNANT NEOPLASM OF BREAST: ICD-10-CM

## 2023-10-20 DIAGNOSIS — M81.0 AGE-RELATED OSTEOPOROSIS WITHOUT CURRENT PATHOLOGICAL FRACTURE: ICD-10-CM

## 2023-10-20 PROCEDURE — 77063 BREAST TOMOSYNTHESIS BI: CPT | Mod: BILATERAL PROCEDURE | Performed by: RADIOLOGY

## 2023-10-20 PROCEDURE — 77063 BREAST TOMOSYNTHESIS BI: CPT | Mod: 50

## 2023-10-20 PROCEDURE — 77080 DXA BONE DENSITY AXIAL: CPT

## 2023-10-20 PROCEDURE — 77067 SCR MAMMO BI INCL CAD: CPT | Mod: BILATERAL PROCEDURE | Performed by: RADIOLOGY

## 2023-10-20 PROCEDURE — 77080 DXA BONE DENSITY AXIAL: CPT | Performed by: RADIOLOGY

## 2023-10-22 NOTE — RESULT ENCOUNTER NOTE
Can you call this patient let her know that her DEXA scan results showed osteoporosis.  Schedule virtual visit to discuss starting medication for this (likely Prolia as it can actually build bone).

## 2023-11-01 ENCOUNTER — TELEPHONE (OUTPATIENT)
Dept: OBSTETRICS AND GYNECOLOGY | Facility: CLINIC | Age: 68
End: 2023-11-01
Payer: COMMERCIAL

## 2023-11-01 NOTE — TELEPHONE ENCOUNTER
[facility] reached out to the patient, who states that she is refusing.   She has a virtual to discuss medication/dexa with Dr. Bueno.  She will see if this medication is a good fit after her conversation with the doctor.  Reviewed and approved by RONNIE MAO on 12/6/23 at 9:43 AM.      Sent to [facility] for shipment. Patient notified.  Reviewed and approved by RONNIE MAO on 11/28/23 at 11:59 AM.      I have called this patient to try to touch base and schedule an appointment to review results.  I will continue to reach out.  Reviewed and approved by RONNIE MAO on 11/1/23 at 11:51 AM.  ----- Message from Grant Bueno DO sent at 10/22/2023  6:55 PM EDT -----  Can you call this patient let her know that her DEXA scan results showed osteoporosis.  Schedule virtual visit to discuss starting medication for this (likely Prolia as it can actually build bone).

## 2023-11-08 ENCOUNTER — LAB (OUTPATIENT)
Dept: LAB | Facility: LAB | Age: 68
End: 2023-11-08
Payer: COMMERCIAL

## 2023-11-08 DIAGNOSIS — N39.0 URINARY TRACT INFECTION WITHOUT HEMATURIA, SITE UNSPECIFIED: ICD-10-CM

## 2023-11-08 LAB
APPEARANCE UR: ABNORMAL
BACTERIA #/AREA URNS AUTO: ABNORMAL /HPF
BILIRUB UR STRIP.AUTO-MCNC: NEGATIVE MG/DL
COLOR UR: YELLOW
GLUCOSE UR STRIP.AUTO-MCNC: NEGATIVE MG/DL
KETONES UR STRIP.AUTO-MCNC: NEGATIVE MG/DL
LEUKOCYTE ESTERASE UR QL STRIP.AUTO: ABNORMAL
MUCOUS THREADS #/AREA URNS AUTO: ABNORMAL /LPF
NITRITE UR QL STRIP.AUTO: NEGATIVE
PH UR STRIP.AUTO: 6 [PH]
PROT UR STRIP.AUTO-MCNC: NEGATIVE MG/DL
RBC # UR STRIP.AUTO: NEGATIVE /UL
RBC #/AREA URNS AUTO: ABNORMAL /HPF
SP GR UR STRIP.AUTO: 1.01
SQUAMOUS #/AREA URNS AUTO: ABNORMAL /HPF
UROBILINOGEN UR STRIP.AUTO-MCNC: 2 MG/DL
WBC #/AREA URNS AUTO: ABNORMAL /HPF

## 2023-11-08 PROCEDURE — 87086 URINE CULTURE/COLONY COUNT: CPT

## 2023-11-08 PROCEDURE — 87186 SC STD MICRODIL/AGAR DIL: CPT

## 2023-11-08 PROCEDURE — 81001 URINALYSIS AUTO W/SCOPE: CPT

## 2023-11-10 LAB — BACTERIA UR CULT: ABNORMAL

## 2023-11-13 NOTE — RESULT ENCOUNTER NOTE
Yumiko is out sick today, I will call pharmacy so patient can start treatment now as we don't have the clearance to send scripts anymore

## 2023-11-17 ENCOUNTER — TELEPHONE (OUTPATIENT)
Dept: GASTROENTEROLOGY | Facility: CLINIC | Age: 68
End: 2023-11-17
Payer: COMMERCIAL

## 2023-11-17 DIAGNOSIS — K21.9 GASTROESOPHAGEAL REFLUX DISEASE, UNSPECIFIED WHETHER ESOPHAGITIS PRESENT: Primary | ICD-10-CM

## 2023-11-17 DIAGNOSIS — K44.9 HIATAL HERNIA: ICD-10-CM

## 2023-11-18 ENCOUNTER — APPOINTMENT (OUTPATIENT)
Dept: UROLOGY | Facility: CLINIC | Age: 68
End: 2023-11-18
Payer: COMMERCIAL

## 2023-11-21 DIAGNOSIS — M81.0 POST-MENOPAUSAL OSTEOPOROSIS: ICD-10-CM

## 2023-11-21 DIAGNOSIS — Z13.820 SCREENING FOR OSTEOPOROSIS: ICD-10-CM

## 2023-11-21 RX ORDER — BERBERINE CHLOR/SEAWEED/CHROM 500-250 MG
CAPSULE ORAL
COMMUNITY
End: 2024-02-02 | Stop reason: WASHOUT

## 2023-11-21 RX ORDER — IPRATROPIUM BROMIDE 21 UG/1
SPRAY, METERED NASAL
COMMUNITY
Start: 2023-09-05 | End: 2024-02-02 | Stop reason: WASHOUT

## 2023-11-21 RX ORDER — KETOROLAC TROMETHAMINE 10 MG/1
1 TABLET, FILM COATED ORAL EVERY 6 HOURS
COMMUNITY
Start: 2023-08-20 | End: 2023-08-23

## 2023-12-16 ENCOUNTER — PROCEDURE VISIT (OUTPATIENT)
Dept: UROLOGY | Facility: CLINIC | Age: 68
End: 2023-12-16
Payer: COMMERCIAL

## 2023-12-16 VITALS
DIASTOLIC BLOOD PRESSURE: 79 MMHG | HEART RATE: 88 BPM | WEIGHT: 131.5 LBS | RESPIRATION RATE: 16 BRPM | SYSTOLIC BLOOD PRESSURE: 136 MMHG | BODY MASS INDEX: 22.57 KG/M2

## 2023-12-16 DIAGNOSIS — N39.0 URINARY TRACT INFECTION WITHOUT HEMATURIA, SITE UNSPECIFIED: ICD-10-CM

## 2023-12-16 DIAGNOSIS — N35.12 POSTINFECTIVE URETHRAL STRICTURE IN FEMALE: Primary | ICD-10-CM

## 2023-12-16 LAB
POC APPEARANCE, URINE: CLEAR
POC BILIRUBIN, URINE: NEGATIVE
POC BLOOD, URINE: NEGATIVE
POC COLOR, URINE: YELLOW
POC GLUCOSE, URINE: NEGATIVE MG/DL
POC KETONES, URINE: NEGATIVE MG/DL
POC LEUKOCYTES, URINE: ABNORMAL
POC NITRITE,URINE: NEGATIVE
POC PH, URINE: 7 PH
POC PROTEIN, URINE: NEGATIVE MG/DL
POC SPECIFIC GRAVITY, URINE: 1.01
POC UROBILINOGEN, URINE: 0.2 EU/DL

## 2023-12-16 PROCEDURE — 81003 URINALYSIS AUTO W/O SCOPE: CPT | Performed by: UROLOGY

## 2023-12-16 PROCEDURE — 51741 ELECTRO-UROFLOWMETRY FIRST: CPT | Performed by: UROLOGY

## 2023-12-16 PROCEDURE — 52281 CYSTOSCOPY AND TREATMENT: CPT | Performed by: UROLOGY

## 2023-12-16 NOTE — PATIENT INSTRUCTIONS
Patient Discussion/Summary     It was very nice to see you again today. We had a long discussion regarding recurrent urinary tract infections and urethral strictures. You had a successful dilatation to 24 Beninese. This appears to be your limit and we will therefore hold the size to 24 Beninese at your request and see you again in 5 weeks. You do have inflammation in the lower third of the bladder. Since you had a recurrent infection we will begin Bactrim at the lower dose on a daily basis.      This note was created with voice-recognition software and was not corrected for typographical or grammatical errors

## 2023-12-16 NOTE — PROGRESS NOTES
Provider Impressions     68 year-old white female self-referred, previous patient of Dr. Oliver Smallwood, for RECURRENT UTI AND BLADDER SPASMS. Urinalysis is negative for blood. CAT scan of the chest, abdomen and pelvis is negative. Renal bladder ultrasound identifies a 3 mm right RENAL CALCULUS. Urine culture was negative but then a second urine culture with was positive for Klebsiella, UTI. Patient retired in 2018, previously employed in [employer]. No family history of breast or prostate cancer. The patient has never smoked cigarettes.      MACROBID  allergy     April 17, 2023, patient arrives alone. She states she has had Recurrent UTIs with Urgency and Frequency. She states that she has had URETHRAL STRICTURES. We discussed the etiology of recurrent UTIs and poor bladder emptying. She still has her uterus and ovaries. She is sexually active. She takes showers, not tub baths. She does not use a swimming pool, hot tub or bicycle. She has been diagnosed with a URETHRAL STRICTURE in the past. We will initiate cystoscopy with urethral dilatation, flow studies and a discussion of treatment options. She may require an alpha agent. Urine culture was negative as well as urine cytology     May 19, 2023, CYSTOSCOPY WITH URETHRAL DILATATION a 20 Vietnamese, flow studies and a discussion of treatment options. DENSE URETHRAL STRICTURE. Severe erythema in the lower half of the bladder. We will increase to 22 Vietnamese and 2 months. Flow rate 9 cc/s PVR 0 cc. Urinalysis and urine culture were negative. We will not add an alpha agent for antibiotic at this time.     August 9, 2023, cystoscopy with urethral dilatation a 20 Vietnamese. Attempts to 22 were unsuccessful due to pain. However the patient had a recent UTI and we will attempt that again at her next visit. Flow rate 2 cc/s, total volume 17 cc, PVR 0 cc. She states that after her first dilatation she noticed a dramatic improvement in flow and emptying. We will try to  continue to increase the size and strongly consider a low-dose daily cephalosporin if necessary.     August 26, 2023, cystoscopy with urethral dilatation to 22 Qatari of a dense urethral stricture upward sloping. Moderate discomfort with no bleeding. Significant erythema in the bladder base and trigone with a very clear line of demarcation. No tumors or stones. Flow rate 2 cc/s, total volume 19 cc, PVR 0 cc. Urinalysis is negative for blood and urine culture no growth. She will return in 4 weeks and we will increase the size to 24 Qatari at patient's request.     September 16, 2023, telephone call, urine culture was positive for E. coli treated with Bactrim     September 23, 2023, cystoscopy with dilatation of a dense urethral stricture to 24 Qatari with moderate bleeding and moderate pain. Erythema confined to the bladder base and trigone. We will not increase the size as this appears to be her limit. . I have added Bactrim single strength daily at this time.. She will return in 4 weeks and then we will increase times weekly    December 16, 2023, cystoscopy with dilatation of a dense urethral stricture to 24 Qatari with minimal bleeding and moderate pain. Erythema confined to the trigone. We will not increase the size as this appears to be her limit. . I have added Bactrim single strength daily at this time.. She will return in 5 weeks and then we will increase times weekly     PLAN:     1. The patient will return in 5 weeks  increase time periods weekly. for cystoscopy with urethral dilatation to 24 Qatari, flow studies and a URINALYSIS AND URINE CULTURE. She will receive PROPHYLAXIS WITH KEFLEX 250 mg p.o. every 12 hours for 4 doses. We will hold the size at 24 Qatari       #2 Bactrim single strength 1 p.o. daily. Transition to twice a week in April 24.    Physical Exam  Vitals and nursing note reviewed. Exam conducted with a chaperone present.   Constitutional:       Appearance: Normal appearance.   HENT:       Head: Normocephalic and atraumatic.   Pulmonary:      Effort: Pulmonary effort is normal.   Abdominal:      Palpations: Abdomen is soft.      Tenderness: There is no abdominal tenderness.   Genitourinary:     General: Normal vulva.      Vagina: No vaginal discharge.   Musculoskeletal:         General: Normal range of motion.      Cervical back: Normal range of motion and neck supple.   Neurological:      General: No focal deficit present.      Mental Status: She is alert and oriented to person, place, and time.   Psychiatric:         Mood and Affect: Mood normal.         Behavior: Behavior normal.         This note was created with voice-recognition software and was not corrected for typographical or grammatical errors

## 2023-12-16 NOTE — PROGRESS NOTES
"Patient ID: Keisha Mohamud is a 68 y.o. female.    Procedures  Pt took Bactrim DS po as prescribed  Anesthesia: Local 2% Lidocaine  Instruments: 6F flexible disposable cystoscope, female dilators    Pt brought to procedure room and placed in dorsal lithotomy position. Pt draped and prepped in normal sterile fashion. 5ml lidocaine instilled into urethral meatus and 5ml instilled into vagina. Pt tolerated well.    I was present as chaperone for the entirety of the procedure Eliza Mai  Cystoscopy performed by Dr. Dale Wagoner        Bedside \"Time Out\" Verification   Today's Date: 12/16/2023. I attest that this time out verification took place prior to the procedure.   Procedure: cysto/dil    RN/LPN/MA:KAREN   Provider: [facility]AL.   Verified By: RN/LPN/MA, KEISHA MOHAMUD and Provider.   Prior to the start of the procedure a time out was taken and the following were verified: the identity of the patient using two patient identifiers, the correct procedure, the correct site marked as indicated, the correct positioning for the patient and the correct equipment was obtained.   Cystoscopy - female KEISHA MOHAMUD identified using two (2) forms of identification.   Procedure: diagnostic cystourethroscopy.  Procedure Note: Time Started: 3:00pm. Time Completed: 2:51 PM  Indications for procedure: irritable voiding symptoms.   Discussed with patient: Risks, benefits, and alternative were discussed in detail. Patient appears to understand and agrees to proceed. Patient has signed the procedure consent form.    CYSTOSCOPY:    Cystoscopy today reveals a dense urethral stricture dilated to 24 Afghan with moderate discomfort.  No bleeding.  Erythema confined posteriorly.  Maximum flow of 8 cc/s, total volume 60 cc.  No PVR today.  "

## 2023-12-16 NOTE — LETTER
December 16, 2023     Natalie Lombardo MD  [address]  [facility] [address]    Patient: Kaelyn Mohamud   YOB: 1955   Date of Visit: 12/16/2023       Dear Dr. Natalie Lombardo MD:    Thank you for referring Kaelyn Mohamud to me for evaluation. Below are my notes for this consultation.  If you have questions, please do not hesitate to call me. I look forward to following your patient along with you.       Sincerely,     Dale Wagoner MD      CC: No Recipients  ______________________________________________________________________________________            Provider Impressions     68 year-old white female self-referred, previous patient of Dr. Oliver Smallwood, for RECURRENT UTI AND BLADDER SPASMS. Urinalysis is negative for blood. CAT scan of the chest, abdomen and pelvis is negative. Renal bladder ultrasound identifies a 3 mm right RENAL CALCULUS. Urine culture was negative but then a second urine culture with was positive for Klebsiella, UTI. Patient retired in 2018, previously employed in [employer]. No family history of breast or prostate cancer. The patient has never smoked cigarettes.      MACROBID  allergy     April 17, 2023, patient arrives alone. She states she has had Recurrent UTIs with Urgency and Frequency. She states that she has had URETHRAL STRICTURES. We discussed the etiology of recurrent UTIs and poor bladder emptying. She still has her uterus and ovaries. She is sexually active. She takes showers, not tub baths. She does not use a swimming pool, hot tub or bicycle. She has been diagnosed with a URETHRAL STRICTURE in the past. We will initiate cystoscopy with urethral dilatation, flow studies and a discussion of treatment options. She may require an alpha agent. Urine culture was negative as well as urine cytology     May 19, 2023, CYSTOSCOPY WITH URETHRAL DILATATION a 20 Georgian, flow studies and a discussion of treatment options. DENSE URETHRAL STRICTURE.  Severe erythema in the lower half of the bladder. We will increase to 22 Burmese and 2 months. Flow rate 9 cc/s PVR 0 cc. Urinalysis and urine culture were negative. We will not add an alpha agent for antibiotic at this time.     August 9, 2023, cystoscopy with urethral dilatation a 20 Burmese. Attempts to 22 were unsuccessful due to pain. However the patient had a recent UTI and we will attempt that again at her next visit. Flow rate 2 cc/s, total volume 17 cc, PVR 0 cc. She states that after her first dilatation she noticed a dramatic improvement in flow and emptying. We will try to continue to increase the size and strongly consider a low-dose daily cephalosporin if necessary.     August 26, 2023, cystoscopy with urethral dilatation to 22 Burmese of a dense urethral stricture upward sloping. Moderate discomfort with no bleeding. Significant erythema in the bladder base and trigone with a very clear line of demarcation. No tumors or stones. Flow rate 2 cc/s, total volume 19 cc, PVR 0 cc. Urinalysis is negative for blood and urine culture no growth. She will return in 4 weeks and we will increase the size to 24 Burmese at patient's request.     September 16, 2023, telephone call, urine culture was positive for E. coli treated with Bactrim     September 23, 2023, cystoscopy with dilatation of a dense urethral stricture to 24 Burmese with moderate bleeding and moderate pain. Erythema confined to the bladder base and trigone. We will not increase the size as this appears to be her limit. . I have added Bactrim single strength daily at this time.. She will return in 4 weeks and then we will increase times weekly    December 16, 2023, cystoscopy with dilatation of a dense urethral stricture to 24 Burmese with minimal bleeding and moderate pain. Erythema confined to the trigone. We will not increase the size as this appears to be her limit. . I have added Bactrim single strength daily at this time.. She will return in 5 weeks  and then we will increase times weekly     PLAN:     1. The patient will return in 5 weeks  increase time periods weekly. for cystoscopy with urethral dilatation to 24 Icelandic, flow studies and a URINALYSIS AND URINE CULTURE. She will receive PROPHYLAXIS WITH KEFLEX 250 mg p.o. every 12 hours for 4 doses. We will hold the size at 24 Icelandic       #2 Bactrim single strength 1 p.o. daily. Transition to twice a week in April 24.    Physical Exam  Vitals and nursing note reviewed. Exam conducted with a chaperone present.   Constitutional:       Appearance: Normal appearance.   HENT:      Head: Normocephalic and atraumatic.   Pulmonary:      Effort: Pulmonary effort is normal.   Abdominal:      Palpations: Abdomen is soft.      Tenderness: There is no abdominal tenderness.   Genitourinary:     General: Normal vulva.      Vagina: No vaginal discharge.   Musculoskeletal:         General: Normal range of motion.      Cervical back: Normal range of motion and neck supple.   Neurological:      General: No focal deficit present.      Mental Status: She is alert and oriented to person, place, and time.   Psychiatric:         Mood and Affect: Mood normal.         Behavior: Behavior normal.         This note was created with voice-recognition software and was not corrected for typographical or grammatical errors

## 2024-01-06 ENCOUNTER — APPOINTMENT (OUTPATIENT)
Dept: UROLOGY | Facility: CLINIC | Age: 69
End: 2024-01-06
Payer: COMMERCIAL

## 2024-01-08 ENCOUNTER — TELEMEDICINE (OUTPATIENT)
Dept: OBSTETRICS AND GYNECOLOGY | Facility: CLINIC | Age: 69
End: 2024-01-08
Payer: COMMERCIAL

## 2024-01-08 DIAGNOSIS — M81.0 AGE-RELATED OSTEOPOROSIS WITHOUT CURRENT PATHOLOGICAL FRACTURE: ICD-10-CM

## 2024-01-08 DIAGNOSIS — Z91.89 FRACTURE RISK ASSESSMENT SCORE (FRAX) INDICATING GREATER THAN 20% RISK FOR MAJOR OSTEOPOROSIS-RELATED FRACTURE: Primary | ICD-10-CM

## 2024-01-08 DIAGNOSIS — Z84.89 FAMILY HISTORY OF FRACTURE OF HIP IN MOTHER: ICD-10-CM

## 2024-01-08 PROCEDURE — 99214 OFFICE O/P EST MOD 30 MIN: CPT | Performed by: OBSTETRICS & GYNECOLOGY

## 2024-01-08 NOTE — PROGRESS NOTES
GYNECOLOGY VIRTUAL VISIT PROGRESS NOTE          CC:     Chief Complaint   Patient presents with    Follow-up     DEXA results        HPI:  Kaelyn Mohamud is scheduled today for virtual visit to discuss test results.   Audio and Visual communication real-time were utilized and verbal consent was obtained for the encounter.    No new GYN complaints.      ROS:  HEENT - has TMJ  GI - has GERD      PHYSICAL EXAM:  VS:  n/a (virtual visit)  GEN:  A&O, NAD  PSYCH:  normal affect, non-anxious      DEXA RESULTS (10/21/23):  10-year Fracture Risk:  Major Osteoporotic Fracture  25.4  Hip Fracture                        6.6  IMPRESSION:  DEXA:  According to World Health Organization criteria,  classification is osteoporosis (T-score of hip=-2.8)      IMPRESSION/PLAN:    Problem List Items Addressed This Visit    None  Visit Diagnoses       Fracture Risk Assessment Score (FRAX) indicating greater than 20% risk for major osteoporosis-related fracture    -  Primary    Age-related osteoporosis without current pathological fracture        Family history of fracture of hip in mother              Osteopenia:  DEXA results showing osteopenia reviewed with patient.   FRAX calculator results also reviewed.  FRAX calculator=25.6% (hip fracture) and 6.8% (any fracture) in the next 10 years.  Patient DOES have an indication for treatment for her osteopenia at this time, due her higher than normal FRAX calculator risk (hip fracture > 3% or any fracture > 20%).  Reviewed treatment options--bisphosphonates, SERMs, or Prolia--and mechanism of action and use/AE reviewed.  No issues with dental disease, sees DDS regularly and oral exam normal.  Patient agreeable to my recommendation to start Prolia Rx at this time.   Discussed with the patient that there are other non-routine Rx options that I do not offer that could be managed by an ENDO, if she fails treatment.   Will repeat DEXA in 3 years--due 10/2026.  Weight-bearing exercise, and avoidance  of hazards around the home discussed.   Lack of beneficial effects for calcium/vitamin D supplementation discussed.    Prolia counseling:  Discussed with patient the most common risks and side effects of Prolia use along with the rare risks of osteonecrosis of the jaw (0.3%) and atypical femur fractures (0.04%).  Discussed that she would need to be on therapy for at least 5 and most likely 10 years, as there is an elevated fracture risk after stopping the medication (due to rapid regression of bone density) if alternate osteoporosis treatment is not started after stopping Prolia.  Discussed with patient that Prolia decreases the risk for spine fractures by 60%, hip fractures by 40%, and other bone fractures by 20%.  Side effects with Prolia use discussed including allergic reaction, back pain (34.7%--risk with placebo is 34.6%), extremity pain (11.7%--risk with placebo is 11.1%), muscle pain (7.6%--risk with placebo is 7.5%), high cholesterol (7.2%--risk with placebo is 6.1% ), bladder infections (5.9%--risk with placebo is 5.8%) and serious infections (4.0%--risk with placebo is 3.3%).           Grant Bueno, DO

## 2024-01-18 ENCOUNTER — LAB (OUTPATIENT)
Dept: LAB | Facility: LAB | Age: 69
End: 2024-01-18
Payer: COMMERCIAL

## 2024-01-18 DIAGNOSIS — N39.0 URINARY TRACT INFECTION WITHOUT HEMATURIA, SITE UNSPECIFIED: ICD-10-CM

## 2024-01-18 LAB
APPEARANCE UR: ABNORMAL
BACTERIA #/AREA URNS AUTO: ABNORMAL /HPF
BILIRUB UR STRIP.AUTO-MCNC: NEGATIVE MG/DL
COLOR UR: YELLOW
GLUCOSE UR STRIP.AUTO-MCNC: NEGATIVE MG/DL
KETONES UR STRIP.AUTO-MCNC: NEGATIVE MG/DL
LEUKOCYTE ESTERASE UR QL STRIP.AUTO: ABNORMAL
MUCOUS THREADS #/AREA URNS AUTO: ABNORMAL /LPF
NITRITE UR QL STRIP.AUTO: NEGATIVE
PH UR STRIP.AUTO: 7 [PH]
PROT UR STRIP.AUTO-MCNC: NEGATIVE MG/DL
RBC # UR STRIP.AUTO: NEGATIVE /UL
RBC #/AREA URNS AUTO: ABNORMAL /HPF
SP GR UR STRIP.AUTO: 1.01
SQUAMOUS #/AREA URNS AUTO: ABNORMAL /HPF
UROBILINOGEN UR STRIP.AUTO-MCNC: <2 MG/DL
WBC #/AREA URNS AUTO: ABNORMAL /HPF

## 2024-01-18 PROCEDURE — 87186 SC STD MICRODIL/AGAR DIL: CPT

## 2024-01-18 PROCEDURE — 81001 URINALYSIS AUTO W/SCOPE: CPT

## 2024-01-18 PROCEDURE — 87086 URINE CULTURE/COLONY COUNT: CPT

## 2024-01-20 LAB — BACTERIA UR CULT: ABNORMAL

## 2024-01-23 ENCOUNTER — PROCEDURE VISIT (OUTPATIENT)
Dept: OBSTETRICS AND GYNECOLOGY | Facility: CLINIC | Age: 69
End: 2024-01-23
Payer: COMMERCIAL

## 2024-01-23 ENCOUNTER — PROCEDURE VISIT (OUTPATIENT)
Dept: UROLOGY | Facility: CLINIC | Age: 69
End: 2024-01-23
Payer: COMMERCIAL

## 2024-01-23 VITALS
DIASTOLIC BLOOD PRESSURE: 68 MMHG | SYSTOLIC BLOOD PRESSURE: 110 MMHG | WEIGHT: 130.9 LBS | HEIGHT: 64 IN | BODY MASS INDEX: 22.35 KG/M2

## 2024-01-23 VITALS
DIASTOLIC BLOOD PRESSURE: 86 MMHG | BODY MASS INDEX: 22.52 KG/M2 | SYSTOLIC BLOOD PRESSURE: 148 MMHG | HEART RATE: 103 BPM | RESPIRATION RATE: 16 BRPM | WEIGHT: 131.17 LBS

## 2024-01-23 DIAGNOSIS — M80.00XA AGE-RELATED OSTEOPOROSIS WITH CURRENT PATHOLOGICAL FRACTURE, INITIAL ENCOUNTER: ICD-10-CM

## 2024-01-23 DIAGNOSIS — N35.12 POSTINFECTIVE URETHRAL STRICTURE IN FEMALE: Primary | ICD-10-CM

## 2024-01-23 DIAGNOSIS — N39.0 URINARY TRACT INFECTION WITHOUT HEMATURIA, SITE UNSPECIFIED: ICD-10-CM

## 2024-01-23 LAB
POC APPEARANCE, URINE: CLEAR
POC BILIRUBIN, URINE: NEGATIVE
POC BLOOD, URINE: ABNORMAL
POC COLOR, URINE: YELLOW
POC GLUCOSE, URINE: NEGATIVE MG/DL
POC KETONES, URINE: NEGATIVE MG/DL
POC LEUKOCYTES, URINE: NEGATIVE
POC NITRITE,URINE: NEGATIVE
POC PH, URINE: 7 PH
POC PROTEIN, URINE: NEGATIVE MG/DL
POC SPECIFIC GRAVITY, URINE: 1.01
POC UROBILINOGEN, URINE: 0.2 EU/DL

## 2024-01-23 PROCEDURE — 96372 THER/PROPH/DIAG INJ SC/IM: CPT | Performed by: OBSTETRICS & GYNECOLOGY

## 2024-01-23 PROCEDURE — 51798 US URINE CAPACITY MEASURE: CPT | Performed by: UROLOGY

## 2024-01-23 PROCEDURE — 51741 ELECTRO-UROFLOWMETRY FIRST: CPT | Performed by: UROLOGY

## 2024-01-23 PROCEDURE — 81003 URINALYSIS AUTO W/O SCOPE: CPT | Performed by: UROLOGY

## 2024-01-23 PROCEDURE — 52281 CYSTOSCOPY AND TREATMENT: CPT | Performed by: UROLOGY

## 2024-01-23 NOTE — PROGRESS NOTES
"Patient ID: Kaelyn Mohamud is a 68 y.o. female.  Of note patient was treated by our office for a UTI yesterday  KAREN    Procedures  Pt took macrodantin 50mg po as prescribed  Anesthesia: Local 2% Lidocaine  Instruments: 6F flexible disposable cystoscope    Pt brought to procedure room and placed in dorsal lithotomy position. Pt draped and prepped in normal sterile fashion. 5ml lidocaine instilled into urethral meatus and 5ml instilled into vagina. Pt tolerated well.    I was present as chaperone for the entirety of the procedure   Eliza Mai  Cystoscopy performed by Dr. Dale Wagoner  Bedside \"Time Out\" Verification   Today's Date: 1/23/2024 . I attest that this time out verification took place prior to the procedure.   Procedure: cysto   RN/LPN/MA:KAREN   Provider: WAL.   Verified By: RN/LPN/MA,  KAREN   and Provider.   Prior to the start of the procedure a time out was taken and the following were verified: the identity of the patient using two patient identifiers, the correct procedure, the correct site marked as indicated, the correct positioning for the patient and the correct equipment was obtained.   Cystoscopy - female        identified using two (2) forms of identification.   Procedure: diagnostic cystourethroscopy.  Procedure Note: Time Started:  Time Completed: 2:35 PM  Indications for procedure: irritable voiding symptoms.   Discussed with patient: Risks, benefits, and alternative were discussed in detail. Patient appears to understand and agrees to proceed. Patient has signed the procedure consent form.    CYSTOSCOPY:    Cystoscopy today reveals a dense urethral stricture dilated to 24 Persian with minimal pain and minimal bleeding.  Once inside the bladder, both ureteral orifices were identified.  No evidence of stones or tumors.  Flow rate 11 cc/s, total volume 230 cc, PVR 47 cc.      "

## 2024-01-23 NOTE — PROGRESS NOTES
Provider Impressions     68 year-old white female self-referred, previous patient of Dr. Oliver Smallwood, for RECURRENT UTI AND BLADDER SPASMS. Urinalysis is negative for blood. CAT scan of the chest, abdomen and pelvis is negative. Renal bladder ultrasound identifies a 3 mm right RENAL CALCULUS. Urine culture was negative but then a second urine culture with was positive for Klebsiella, UTI. Patient retired in 2018, previously employed in [employer]. No family history of breast or prostate cancer. The patient has never smoked cigarettes.      MACROBID  allergy     April 17, 2023, patient arrives alone. She states she has had Recurrent UTIs with Urgency and Frequency. She states that she has had URETHRAL STRICTURES. We discussed the etiology of recurrent UTIs and poor bladder emptying. She still has her uterus and ovaries. She is sexually active. She takes showers, not tub baths. She does not use a swimming pool, hot tub or bicycle. She has been diagnosed with a URETHRAL STRICTURE in the past. We will initiate cystoscopy with urethral dilatation, flow studies and a discussion of treatment options. She may require an alpha agent. Urine culture was negative as well as urine cytology     May 19, 2023, CYSTOSCOPY WITH URETHRAL DILATATION a 20 Irish, flow studies and a discussion of treatment options. DENSE URETHRAL STRICTURE. Severe erythema in the lower half of the bladder. We will increase to 22 Irish and 2 months. Flow rate 9 cc/s PVR 0 cc. Urinalysis and urine culture were negative. We will not add an alpha agent for antibiotic at this time.     August 9, 2023, cystoscopy with urethral dilatation a 20 Irish. Attempts to 22 were unsuccessful due to pain. However the patient had a recent UTI and we will attempt that again at her next visit. Flow rate 2 cc/s, total volume 17 cc, PVR 0 cc. She states that after her first dilatation she noticed a dramatic improvement in flow and emptying. We will try to continue  to increase the size and strongly consider a low-dose daily cephalosporin if necessary.     August 26, 2023, cystoscopy with urethral dilatation to 22 Bermudian of a dense urethral stricture upward sloping. Moderate discomfort with no bleeding. Significant erythema in the bladder base and trigone with a very clear line of demarcation. No tumors or stones. Flow rate 2 cc/s, total volume 19 cc, PVR 0 cc. Urinalysis is negative for blood and urine culture no growth. She will return in 4 weeks and we will increase the size to 24 Bermudian at patient's request.     September 16, 2023, telephone call, urine culture was positive for E. coli treated with Bactrim     September 23, 2023, cystoscopy with dilatation of a dense urethral stricture to 24 Bermudian with moderate bleeding and moderate pain. Erythema confined to the bladder base and trigone. We will not increase the size as this appears to be her limit. . I have added Bactrim single strength daily at this time.. She will return in 4 weeks and then we will increase times weekly     December 16, 2023, cystoscopy with dilatation of a dense urethral stricture to 24 Bermudian with minimal bleeding and moderate pain. Erythema confined to the trigone. We will not increase the size as this appears to be her limit. . I have added Bactrim single strength daily at this time. She will return in 5 weeks and then we will increase times weekly    January 20, 2024, telephone call, urine culture is positive for E. coli.  Keflex ordered.  Resistant to Bactrim.    January 23, 2024, cystoscopy with dilatation of a dense urethral stricture to 24 Bermudian with minimal pain and minimal bleeding.  Once again erythema confined to the trigone.  24 Bermudian is the patient's limit.  We will change her daily Bactrim to Keflex as it was resistant to her most recent E. coli infection.  We will expand to 6-week regimen.     PLAN:     1. The patient will return in 6 weeks  increase time periods weekly. for  cystoscopy with urethral dilatation to 24 Romansh, flow studies and a URINALYSIS AND URINE CULTURE. She will receive PROPHYLAXIS WITH KEFLEX 250 mg p.o. every 12 hours for 4 doses. We will hold the size at 24 Romansh       #2  Keflex 250 mg p.o. daily. Transition to twice a week in April 24.     Physical Exam  Vitals and nursing note reviewed. Exam conducted with a chaperone present.   Constitutional:       Appearance: Normal appearance.   HENT:      Head: Normocephalic and atraumatic.   Pulmonary:      Effort: Pulmonary effort is normal.   Abdominal:      Palpations: Abdomen is soft.      Tenderness: There is no abdominal tenderness.   Genitourinary:     General: Normal vulva.      Vagina: No vaginal discharge.   Musculoskeletal:         General: Normal range of motion.      Cervical back: Normal range of motion and neck supple.   Neurological:      General: No focal deficit present.      Mental Status: She is alert and oriented to person, place, and time.   Psychiatric:         Mood and Affect: Mood normal.         Behavior: Behavior normal.        This note was created with voice-recognition software and was not corrected for typographical or grammatical errors

## 2024-01-23 NOTE — PATIENT INSTRUCTIONS
Patient Discussion/Summary     It was very nice to see you again today. We had a long discussion regarding recurrent urinary tract infections and urethral strictures. You had a successful dilatation to 24 Malawian. This appears to be your limit and we will therefore hold the size to 24 Malawian at your request and see you again in 6 weeks. You do have inflammation in the lower third of the bladder. Since you had a recurrent infection we will treat with Keflex at the lower dose on a daily basis.      This note was created with voice-recognition software and was not corrected for typographical or grammatical errors

## 2024-01-23 NOTE — PROGRESS NOTES
Prolia injection given in the right upper arm/back - subQ - tolerated well  Next injection due in 6 months.  Patient supply

## 2024-01-23 NOTE — LETTER
January 23, 2024     Natalie Lombardo MD  [address]  [facility] [address]    Patient: Kaelyn Mohamud   YOB: 1955   Date of Visit: 1/23/2024       Dear Dr. Natalie Lombardo MD:    Thank you for referring Kaelyn Mohamud to me for evaluation. Below are my notes for this consultation.  If you have questions, please do not hesitate to call me. I look forward to following your patient along with you.       Sincerely,     Dale Wagoner MD      CC: No Recipients  ______________________________________________________________________________________      Provider Impressions     68 year-old white female self-referred, previous patient of Dr. Oliver Smallwood, for RECURRENT UTI AND BLADDER SPASMS. Urinalysis is negative for blood. CAT scan of the chest, abdomen and pelvis is negative. Renal bladder ultrasound identifies a 3 mm right RENAL CALCULUS. Urine culture was negative but then a second urine culture with was positive for Klebsiella, UTI. Patient retired in 2018, previously employed in [employer]. No family history of breast or prostate cancer. The patient has never smoked cigarettes.      MACROBID  allergy     April 17, 2023, patient arrives alone. She states she has had Recurrent UTIs with Urgency and Frequency. She states that she has had URETHRAL STRICTURES. We discussed the etiology of recurrent UTIs and poor bladder emptying. She still has her uterus and ovaries. She is sexually active. She takes showers, not tub baths. She does not use a swimming pool, hot tub or bicycle. She has been diagnosed with a URETHRAL STRICTURE in the past. We will initiate cystoscopy with urethral dilatation, flow studies and a discussion of treatment options. She may require an alpha agent. Urine culture was negative as well as urine cytology     May 19, 2023, CYSTOSCOPY WITH URETHRAL DILATATION a 20 Montenegrin, flow studies and a discussion of treatment options. DENSE URETHRAL STRICTURE. Severe  erythema in the lower half of the bladder. We will increase to 22 Latvian and 2 months. Flow rate 9 cc/s PVR 0 cc. Urinalysis and urine culture were negative. We will not add an alpha agent for antibiotic at this time.     August 9, 2023, cystoscopy with urethral dilatation a 20 Latvian. Attempts to 22 were unsuccessful due to pain. However the patient had a recent UTI and we will attempt that again at her next visit. Flow rate 2 cc/s, total volume 17 cc, PVR 0 cc. She states that after her first dilatation she noticed a dramatic improvement in flow and emptying. We will try to continue to increase the size and strongly consider a low-dose daily cephalosporin if necessary.     August 26, 2023, cystoscopy with urethral dilatation to 22 Latvian of a dense urethral stricture upward sloping. Moderate discomfort with no bleeding. Significant erythema in the bladder base and trigone with a very clear line of demarcation. No tumors or stones. Flow rate 2 cc/s, total volume 19 cc, PVR 0 cc. Urinalysis is negative for blood and urine culture no growth. She will return in 4 weeks and we will increase the size to 24 Latvian at patient's request.     September 16, 2023, telephone call, urine culture was positive for E. coli treated with Bactrim     September 23, 2023, cystoscopy with dilatation of a dense urethral stricture to 24 Latvian with moderate bleeding and moderate pain. Erythema confined to the bladder base and trigone. We will not increase the size as this appears to be her limit. . I have added Bactrim single strength daily at this time.. She will return in 4 weeks and then we will increase times weekly     December 16, 2023, cystoscopy with dilatation of a dense urethral stricture to 24 Latvian with minimal bleeding and moderate pain. Erythema confined to the trigone. We will not increase the size as this appears to be her limit. . I have added Bactrim single strength daily at this time. She will return in 5 weeks and  then we will increase times weekly    January 20, 2024, telephone call, urine culture is positive for E. coli.  Keflex ordered.  Resistant to Bactrim.    January 23, 2024, cystoscopy with dilatation of a dense urethral stricture to 24 Macanese with minimal pain and minimal bleeding.  Once again erythema confined to the trigone.  24 Macanese is the patient's limit.  We will change her daily Bactrim to Keflex as it was resistant to her most recent E. coli infection.  We will expand to 6-week regimen.     PLAN:     1. The patient will return in 6 weeks  increase time periods weekly. for cystoscopy with urethral dilatation to 24 Macanese, flow studies and a URINALYSIS AND URINE CULTURE. She will receive PROPHYLAXIS WITH KEFLEX 250 mg p.o. every 12 hours for 4 doses. We will hold the size at 24 Macanese       #2  Keflex 250 mg p.o. daily. Transition to twice a week in April 24.     Physical Exam  Vitals and nursing note reviewed. Exam conducted with a chaperone present.   Constitutional:       Appearance: Normal appearance.   HENT:      Head: Normocephalic and atraumatic.   Pulmonary:      Effort: Pulmonary effort is normal.   Abdominal:      Palpations: Abdomen is soft.      Tenderness: There is no abdominal tenderness.   Genitourinary:     General: Normal vulva.      Vagina: No vaginal discharge.   Musculoskeletal:         General: Normal range of motion.      Cervical back: Normal range of motion and neck supple.   Neurological:      General: No focal deficit present.      Mental Status: She is alert and oriented to person, place, and time.   Psychiatric:         Mood and Affect: Mood normal.         Behavior: Behavior normal.        This note was created with voice-recognition software and was not corrected for typographical or grammatical errors

## 2024-01-26 DIAGNOSIS — N39.0 RECURRENT UTI: ICD-10-CM

## 2024-01-26 RX ORDER — CEPHALEXIN 250 MG/1
250 CAPSULE ORAL DAILY
Qty: 90 CAPSULE | Refills: 3 | Status: SHIPPED | OUTPATIENT
Start: 2024-01-26 | End: 2025-01-20

## 2024-02-05 ENCOUNTER — ANESTHESIA EVENT (OUTPATIENT)
Dept: GASTROENTEROLOGY | Facility: HOSPITAL | Age: 69
End: 2024-02-05
Payer: COMMERCIAL

## 2024-02-05 ENCOUNTER — ANESTHESIA (OUTPATIENT)
Dept: GASTROENTEROLOGY | Facility: HOSPITAL | Age: 69
End: 2024-02-05
Payer: COMMERCIAL

## 2024-02-05 ENCOUNTER — HOSPITAL ENCOUNTER (OUTPATIENT)
Dept: GASTROENTEROLOGY | Facility: HOSPITAL | Age: 69
Discharge: HOME | End: 2024-02-05
Payer: COMMERCIAL

## 2024-02-05 VITALS
RESPIRATION RATE: 18 BRPM | DIASTOLIC BLOOD PRESSURE: 94 MMHG | OXYGEN SATURATION: 99 % | HEART RATE: 70 BPM | SYSTOLIC BLOOD PRESSURE: 140 MMHG | TEMPERATURE: 97 F

## 2024-02-05 DIAGNOSIS — K44.9 HIATAL HERNIA: ICD-10-CM

## 2024-02-05 DIAGNOSIS — K21.9 GASTROESOPHAGEAL REFLUX DISEASE, UNSPECIFIED WHETHER ESOPHAGITIS PRESENT: ICD-10-CM

## 2024-02-05 PROCEDURE — 7100000009 HC PHASE TWO TIME - INITIAL BASE CHARGE

## 2024-02-05 PROCEDURE — 2500000004 HC RX 250 GENERAL PHARMACY W/ HCPCS (ALT 636 FOR OP/ED): Performed by: INTERNAL MEDICINE

## 2024-02-05 PROCEDURE — 3700000002 HC GENERAL ANESTHESIA TIME - EACH INCREMENTAL 1 MINUTE

## 2024-02-05 PROCEDURE — 3700000001 HC GENERAL ANESTHESIA TIME - INITIAL BASE CHARGE

## 2024-02-05 PROCEDURE — 2500000004 HC RX 250 GENERAL PHARMACY W/ HCPCS (ALT 636 FOR OP/ED): Performed by: REGISTERED NURSE

## 2024-02-05 PROCEDURE — 7100000010 HC PHASE TWO TIME - EACH INCREMENTAL 1 MINUTE

## 2024-02-05 PROCEDURE — A43235 PR ESOPHAGOGASTRODUODENOSCOPY TRANSORAL DIAGNOSTIC: Performed by: ANESTHESIOLOGY

## 2024-02-05 PROCEDURE — 43235 EGD DIAGNOSTIC BRUSH WASH: CPT | Performed by: INTERNAL MEDICINE

## 2024-02-05 PROCEDURE — 2720000007 HC OR 272 NO HCPCS

## 2024-02-05 PROCEDURE — 91035 G-ESOPH REFLX TST W/ELECTROD: CPT | Performed by: ANESTHESIOLOGY

## 2024-02-05 PROCEDURE — 91035 G-ESOPH REFLX TST W/ELECTROD: CPT | Performed by: INTERNAL MEDICINE

## 2024-02-05 PROCEDURE — 2500000005 HC RX 250 GENERAL PHARMACY W/O HCPCS: Performed by: REGISTERED NURSE

## 2024-02-05 RX ORDER — PROPOFOL 10 MG/ML
INJECTION, EMULSION INTRAVENOUS AS NEEDED
Status: DISCONTINUED | OUTPATIENT
Start: 2024-02-05 | End: 2024-02-05

## 2024-02-05 RX ORDER — LIDOCAINE HCL/PF 100 MG/5ML
SYRINGE (ML) INTRAVENOUS AS NEEDED
Status: DISCONTINUED | OUTPATIENT
Start: 2024-02-05 | End: 2024-02-05

## 2024-02-05 RX ORDER — PROPOFOL 10 MG/ML
INJECTION, EMULSION INTRAVENOUS CONTINUOUS PRN
Status: DISCONTINUED | OUTPATIENT
Start: 2024-02-05 | End: 2024-02-05

## 2024-02-05 RX ORDER — SODIUM CHLORIDE, SODIUM LACTATE, POTASSIUM CHLORIDE, CALCIUM CHLORIDE 600; 310; 30; 20 MG/100ML; MG/100ML; MG/100ML; MG/100ML
20 INJECTION, SOLUTION INTRAVENOUS CONTINUOUS
Status: DISCONTINUED | OUTPATIENT
Start: 2024-02-05 | End: 2024-02-06 | Stop reason: HOSPADM

## 2024-02-05 RX ADMIN — SODIUM CHLORIDE, POTASSIUM CHLORIDE, SODIUM LACTATE AND CALCIUM CHLORIDE 20 ML/HR: 600; 310; 30; 20 INJECTION, SOLUTION INTRAVENOUS at 10:40

## 2024-02-05 RX ADMIN — PROPOFOL 100 MCG/KG/MIN: 10 INJECTION, EMULSION INTRAVENOUS at 11:32

## 2024-02-05 RX ADMIN — LIDOCAINE HYDROCHLORIDE 100 MG: 20 INJECTION, SOLUTION INTRAVENOUS at 11:30

## 2024-02-05 RX ADMIN — PROPOFOL 60 MG: 10 INJECTION, EMULSION INTRAVENOUS at 11:30

## 2024-02-05 SDOH — HEALTH STABILITY: MENTAL HEALTH: CURRENT SMOKER: 0

## 2024-02-05 ASSESSMENT — PAIN - FUNCTIONAL ASSESSMENT
PAIN_FUNCTIONAL_ASSESSMENT: 0-10
PAIN_FUNCTIONAL_ASSESSMENT: 0-10

## 2024-02-05 ASSESSMENT — PAIN SCALES - GENERAL
PAINLEVEL_OUTOF10: 0 - NO PAIN
PAIN_LEVEL: 0
PAINLEVEL_OUTOF10: 0 - NO PAIN

## 2024-02-05 NOTE — ANESTHESIA PREPROCEDURE EVALUATION
Patient: Kaelyn Mohamud    Procedure Information       Date/Time: 02/05/24 1100    Scheduled providers: Jeb Culver MD; Pedro Awan MD    Procedures:       BRAVO      EGD    Location: [facility]            Relevant Problems   Anesthesia (within normal limits)      Cardiovascular   (+) Hyperlipidemia      GI   (+) Chronic diarrhea of unknown origin   (+) GERD (gastroesophageal reflux disease)      /Renal   (+) UTI (urinary tract infection)      Neuro/Psych   (+) Anxiety   (+) Anxiety and depression   (+) Depression   (+) Piriformis syndrome      Hematology   (+) Anemia      Infectious Disease   (+) URTI (acute upper respiratory infection)   (+) UTI (urinary tract infection)       Clinical information reviewed:    Allergies  Meds               NPO Detail:  NPO/Void Status  Carbohydrate Drink Given Prior to Surgery? : N  Date of Last Liquid: 02/05/24  Time of Last Liquid: 0000  Date of Last Solid: 02/04/24  Time of Last Solid: 1930  Last Intake Type: Light meal  Time of Last Void: 1012         Physical Exam    Airway  Mallampati: II  TM distance: <3 FB  Neck ROM: full     Cardiovascular   Rhythm: regular  Rate: normal     Dental - normal exam     Pulmonary    Abdominal        Anesthesia Plan    History of general anesthesia?: yes  History of complications of general anesthesia?: no    ASA 3     MAC     The patient is not a current smoker.  Patient was not previously instructed to abstain from smoking on day of procedure.  Patient did not smoke on day of procedure.    intravenous induction   Anesthetic plan and risks discussed with patient.  Use of blood products discussed with patient who.    Plan discussed with CRNA.

## 2024-02-05 NOTE — ANESTHESIA POSTPROCEDURE EVALUATION
Patient: Kaelyn Mohamud    Procedure Summary       Date: 02/05/24 Room / Location: [facility]    Anesthesia Start: 1126 Anesthesia Stop: 1145    Procedures:       BRAVO      EGD Diagnosis:       Gastroesophageal reflux disease, unspecified whether esophagitis present      Hiatal hernia    Scheduled Providers: Jeb Culver MD; Pedro Awan MD Responsible Provider: Pedro Awan MD    Anesthesia Type: MAC ASA Status: 3            Anesthesia Type: MAC    Vitals Value Taken Time   /94 02/05/24 1215   Temp 36.1 °C (97 °F) 02/05/24 1142   Pulse 70 02/05/24 1215   Resp 18 02/05/24 1215   SpO2 99 % 02/05/24 1215       Anesthesia Post Evaluation    Patient location during evaluation: PACU  Patient participation: complete - patient participated  Level of consciousness: awake and alert  Pain score: 0  Pain management: adequate  Airway patency: patent  Cardiovascular status: acceptable  Respiratory status: acceptable  Hydration status: acceptable  Postoperative Nausea and Vomiting: none        There were no known notable events for this encounter.

## 2024-02-05 NOTE — H&P
Procedure H&P    Patient Profile-Procedures  Name Kaelyn Mohamud  Date of Birth 1955  MRN [id number]  Address   [address]  [place] [id number]  [place] [address]    Primary Phone Number [phone]  Secondary Phone Number    PCP Natalie Lombardo    Procedure(s):  Procedures: EGD  Primary contact name and number   Extended Emergency Contact Information  Primary Emergency Contact: Alejandro Lopez  Home Phone: [phone]  Relation: Friend    General Health  Weight There were no vitals filed for this visit.  BMI There is no height or weight on file to calculate BMI.    Allergies  Allergies   Allergen Reactions    Animal Dander Other     Sneezing -itcy eyes-occassionally wheezing    Cat Dander Unknown     Sneezing-itchy eyes-occasionally will cause wheezing    Dog Dander Unknown     Sneezing-itching-occasionally wheezing    House Dust Unknown     Sneezing-itching-occasionally whezing    Kenalog [Triamcinolone Acetonide] Other     Nausea/vomiting-generalized pain-feeling of tiredness-very sick from    Meperidine Unknown and GI Upset     Nausea-vomiting-diarrhea    Nitrofurantoin Monohyd/M-Cryst Hives    Sulfa (Sulfonamide Antibiotics) Rash and Swelling    Tetracycline Hives, Nausea And Vomiting, Nausea Only and Nausea/vomiting    Tetracyclines Rash     As child       Past Medical History   Past Medical History:   Diagnosis Date    Plantar fascial fibromatosis 10/06/2020    Plantar fasciitis of left foot       Provider assessment  Diagnosis: GERD  Medication Reviewed - yes  Prior to Admission medications    Medication Sig Start Date End Date Taking? Authorizing Provider   albuterol (Ventolin HFA) 90 mcg/actuation inhaler Inhale 2 puffs every 4 hours if needed for wheezing or shortness of breath. 7/20/23 7/19/24 Yes Natalie Lombardo MD   azelastine (Astelin) 137 mcg (0.1 %) nasal spray Use 2 spray(s) in each nostril once daily 5/25/23  Yes Natalie Lombardo MD   cephalexin  (Keflex) 250 mg capsule Take 1 capsule (250 mg) by mouth early in the morning.. 1/26/24 1/20/25 Yes Dale Wagoner MD   cranberry conc-ascorbic acid 140-100 mg capsule Take by mouth.   Yes Historical Provider, MD   denosumab (Prolia) 60 mg/mL syringe Inject 1 mL (60 mg total) under the skin every 6 months. 11/28/23 11/27/24 Yes Grant Bueno, DO   Lacto no.[id number]-larch 25B cell-25B cell-50 mg capsule Take 2 capsules by mouth once daily. 5/8/23  Yes Historical Provider, MD   pantoprazole (ProtoNix) 40 mg EC tablet Take 1 tablet (40 mg) by mouth once daily in the morning. Take before meals.   Yes Historical Provider, MD   rosuvastatin (Crestor) 5 mg tablet Take 1 tablet by mouth once daily 8/31/23  Yes Natalie Lombardo MD   sertraline (Zoloft) 50 mg tablet TAKE 1/2 (ONE-HALF) TABLET BY MOUTH ONCE DAILY FOR 10 DAYS THEN  INCREASE  TO  1  TABLET  DAILY  Patient taking differently: Take 0.5 tablets (25 mg) by mouth once daily. 6/29/23  Yes Natalie Lombardo MD   ashwagandha extract 120 mg capsule Take by mouth.  2/2/24  Historical Provider, MD   ipratropium (Atrovent) 21 mcg (0.03 %) nasal spray Administer into affected nostril(s). 9/5/23 2/2/24  Historical Provider, MD   ranitidine HCl (ACID CONTROL, RANITIDINE, ORAL) Take 150 mg by mouth twice a day. 9/27/16 2/2/24  Historical Provider, MD       Physical Exam  Vitals:    02/05/24 1012   BP: 137/88   Pulse: 84   Resp: 20   Temp: 36.3 °C (97.3 °F)   SpO2: 99%        General: A&Ox3, NAD.  HEENT: AT/NC.   CV: RRR. No murmur.  Resp: CTA bilaterally. No wheezing, rhonchi or rales.   GI: Soft, NT/ND. BSx4.  Extrem: No edema. Pulses intact.  Skin: No Jaundice.   Neuro: No focal deficits.   Psych: Normal mood and affect.      Procedure Plan - pre-procedural (re)assesment completed by physician:  discharge/transfer patient when discharge criteria met    Jeb Culver MD  2/5/2024 11:28 AM

## 2024-02-05 NOTE — DISCHARGE INSTRUCTIONS
Patient Instructions after an EGD      The anesthetics, sedatives or narcotics which were given to you today will be acting in your body for the next 24 hours, so you might feel a little sleepy or groggy.  This feeling should slowly wear off. Carefully read and follow the instructions.     You received sedation today:  - Do not drive or operate any machinery or power tools of any kind.   - No alcoholic beverages today, not even beer or wine.  - Do not make any important decisions or sign any legal documents.  - No over the counter medications that contain alcohol or that may cause drowsiness.  - Do not make any important decisions or sign any legal documents.    While it is common to experience mild to moderate abdominal distention, gas, or belching after your procedure, if any of these symptoms occur following discharge from the GI Lab or within one week of having your procedure, call the [facility] to be advised whether a visit to your nearest Urgent Care or Emergency Department is indicated.  Take this paper with you if you go.     - If you develop an allergic reaction to the medications that were given during your procedure such as difficulty breathing, rash, hives, severe nausea, vomiting or lightheadedness.  - If you experience chest pain, shortness of breath, severe abdominal pain, fevers and chills.  -If you develop signs and symptoms of bleeding such as blood in your spit, if your stools turn black, tarry, or bloody  - If you have not urinated within 8 hours following your procedure.  - If your IV site becomes painful, red, inflamed, or looks infected.    If you received a biopsy/polypectomy/sphincterotomy the following instructions apply below:    __ Do not use Aspirin containing products, non-steroidal medications or anti-coagulants for one week following your procedure. (Examples of these types of medications are: Advil, Arthrotec, Aleve, Coumadin, Ecotrin, Heparin, Ibuprofen, Indocin,  Motrin, Naprosyn, Nuprin, Plavix, Vioxx, and Voltarin, or their generic forms.  This list is not all-inclusive.  Check with your physician or pharmacist before resuming medications.)   __ Eat a soft diet today.  Avoid foods that are poorly digested for the next 24 hours.  These foods would include: nuts, beans, lettuce, red meats, and fried foods. Start with liquids and advance your diet as tolerated, gradually work up to eating solids.   __ Do not have a Barium Study or Enema for one week.    Your physician recommends the additional following instructions:    -You have a contact number available for emergencies. The signs and symptoms of potential delayed complications were discussed with you. You may return to normal activities tomorrow.  -Resume your previous diet.  -Continue your present medications.   -We are waiting for your pathology results.  -Your physician has recommended a repeat colonoscopy (date to be determined after pending pathology results are reviewed) for surveillance based on pathology results.  -The findings and recommendations have been discussed with you.  -The findings and recommendations were discussed with your family.  - Please see Medication Reconciliation Form for new medication/medications prescribed.       If you experience any problems or have any questions following discharge from the GI Lab, please call: [phone]

## 2024-02-14 PROCEDURE — 91035 G-ESOPH REFLX TST W/ELECTROD: CPT | Performed by: INTERNAL MEDICINE

## 2024-02-16 ENCOUNTER — HOSPITAL ENCOUNTER (OUTPATIENT)
Dept: RADIOLOGY | Facility: HOSPITAL | Age: 69
Discharge: HOME | End: 2024-02-16
Payer: COMMERCIAL

## 2024-02-16 DIAGNOSIS — R05.9 COUGH, UNSPECIFIED TYPE: ICD-10-CM

## 2024-02-16 DIAGNOSIS — J21.9 BRONCHIOLITIS: ICD-10-CM

## 2024-02-16 PROCEDURE — 71046 X-RAY EXAM CHEST 2 VIEWS: CPT

## 2024-02-16 PROCEDURE — 71046 X-RAY EXAM CHEST 2 VIEWS: CPT | Performed by: RADIOLOGY

## 2024-02-21 ENCOUNTER — OFFICE VISIT (OUTPATIENT)
Dept: PRIMARY CARE | Facility: CLINIC | Age: 69
End: 2024-02-21
Payer: COMMERCIAL

## 2024-02-21 VITALS
TEMPERATURE: 97.8 F | WEIGHT: 130 LBS | BODY MASS INDEX: 22.31 KG/M2 | HEART RATE: 76 BPM | SYSTOLIC BLOOD PRESSURE: 128 MMHG | DIASTOLIC BLOOD PRESSURE: 78 MMHG

## 2024-02-21 DIAGNOSIS — J20.8 ACUTE BRONCHITIS DUE TO OTHER SPECIFIED ORGANISMS: Primary | ICD-10-CM

## 2024-02-21 PROCEDURE — 1125F AMNT PAIN NOTED PAIN PRSNT: CPT | Performed by: INTERNAL MEDICINE

## 2024-02-21 PROCEDURE — 1036F TOBACCO NON-USER: CPT | Performed by: INTERNAL MEDICINE

## 2024-02-21 PROCEDURE — 1159F MED LIST DOCD IN RCRD: CPT | Performed by: INTERNAL MEDICINE

## 2024-02-21 PROCEDURE — 99213 OFFICE O/P EST LOW 20 MIN: CPT | Performed by: INTERNAL MEDICINE

## 2024-02-21 RX ORDER — METHYLPREDNISOLONE 4 MG/1
TABLET ORAL
Qty: 21 TABLET | Refills: 0 | Status: SHIPPED | OUTPATIENT
Start: 2024-02-21 | End: 2024-02-28

## 2024-02-21 ASSESSMENT — ENCOUNTER SYMPTOMS
COUGH: 1
CARDIOVASCULAR NEGATIVE: 1
WHEEZING: 1
EYES NEGATIVE: 1
CONSTITUTIONAL NEGATIVE: 1

## 2024-02-21 NOTE — PROGRESS NOTES
Subjective   Patient ID: Kaelyn Mohamud is a 68 y.o. female who presents for Follow-up (Pt here for office visit no labs but has been doing a lot of coughing and did a chest  xray).    HPI patient here for follow-up she has been coughing for the last couple of weeks she had a chest x-ray done which was negative for pneumonia she is coughing but not wheezing and is seldom does she gets short of breath the cough is nonproductive she has no chest pain shortness of breath any symptoms of PND orthopnea    Review of Systems   Constitutional: Negative.    HENT: Negative.     Eyes: Negative.    Respiratory:  Positive for cough and wheezing.    Cardiovascular: Negative.    All other systems reviewed and are negative.      Objective   /78   Pulse 76   Temp 36.6 °C (97.8 °F) (Temporal)   Wt 59 kg (130 lb)   BMI 22.31 kg/m²     Physical Exam  Vitals reviewed.   Constitutional:       Appearance: Normal appearance.   HENT:      Nose: Nose normal.   Eyes:      Conjunctiva/sclera: Conjunctivae normal.   Neck:      Vascular: No carotid bruit.   Cardiovascular:      Rate and Rhythm: Normal rate and regular rhythm.   Pulmonary:      Effort: Pulmonary effort is normal.      Breath sounds: Normal breath sounds.   Lymphadenopathy:      Cervical: No cervical adenopathy.   Neurological:      General: No focal deficit present.      Mental Status: She is alert and oriented to person, place, and time.   Psychiatric:         Mood and Affect: Mood normal.         Behavior: Behavior normal.         Thought Content: Thought content normal.       Assessment/Plan   Problem List Items Addressed This Visit    None  Visit Diagnoses         Codes    Acute bronchitis due to other specified organisms    -  Primary J20.8    Relevant Medications    methylPREDNISolone (Medrol Dospak) 4 mg tablets        Patient already has an inhaler she will use that she will also be given Medrol Dosepak short-term follow-up advised.  If her symptoms get worse  she should go to ER.  She will continue Zoloft for anxiety and irritability of mood.  And depression

## 2024-02-22 ENCOUNTER — OFFICE VISIT (OUTPATIENT)
Dept: SURGERY | Facility: CLINIC | Age: 69
End: 2024-02-22
Payer: COMMERCIAL

## 2024-02-22 VITALS — BODY MASS INDEX: 22.2 KG/M2 | WEIGHT: 130 LBS | HEIGHT: 64 IN

## 2024-02-22 DIAGNOSIS — K21.00 HIATAL HERNIA WITH GASTROESOPHAGEAL REFLUX DISEASE AND ESOPHAGITIS: Primary | ICD-10-CM

## 2024-02-22 DIAGNOSIS — K44.9 HIATAL HERNIA WITH GASTROESOPHAGEAL REFLUX DISEASE AND ESOPHAGITIS: Primary | ICD-10-CM

## 2024-02-22 PROCEDURE — 99215 OFFICE O/P EST HI 40 MIN: CPT | Performed by: SURGERY

## 2024-02-22 PROCEDURE — 1159F MED LIST DOCD IN RCRD: CPT | Performed by: SURGERY

## 2024-02-22 PROCEDURE — 1036F TOBACCO NON-USER: CPT | Performed by: SURGERY

## 2024-02-22 PROCEDURE — 1160F RVW MEDS BY RX/DR IN RCRD: CPT | Performed by: SURGERY

## 2024-02-22 PROCEDURE — 1125F AMNT PAIN NOTED PAIN PRSNT: CPT | Performed by: SURGERY

## 2024-02-22 NOTE — PROGRESS NOTES
Subjective   Patient ID: Kaelyn Mohamud is a 68 y.o. female who presents for No chief complaint on file..  HPI  68-year-old female patient well-known to me. Chronic history of heartburn, acid reflux and chest pain. Hiatal hernia noted on previous EGD. She continues to have GERD symptoms despite Protonix 40 mg BID.  On February 5, she underwent EGD with Bravo pH test. Endoscopically, she was noted to have LA grade B esophagitis and 4 cm hiatal hernia.  Acid exposure time (AET) at 11.5%, DeMeester score 44 and symptom association probability for heartburn, chest pain and regurgitation was 100%. In addition to the Protonix BID, she is taking 5-6 tums daily. Her coffee especially is causing pressure in the epigastric region. Had nausea during the Bravo test but none currently. Also patient has been coughing for weeks and currently on Medrol Dosepak by PCP. Denies tobacco use but admits to occasional ETOH use. Drinks at least a cup of coffee daily.    Objective   Physical Exam  Constitutional: no acute distress, well appearing and well nourished  Pulmonary: normal respiratory effort; clear to auscultation bilaterally, no wheezes or bronchi   Cardiovascular: regular rate and rhythm, no murmurs or extra-heart sounds; pedal pulses are normal; no extremities edema or varicosities, no peripheral edema  Abdomen: soft, non-tender, non-distended; no organomegaly; no peritoneal sign, no hernia  Musculoskeletal: digits and nails normal without clubbing or cyanosis; Joints, bones and muscles are normal with normal range of motion; muscle strength/tone is normal  Skin: normal without rashes or lesion  Neurologic: cranial nerve II-XII intact grossly; normal gait  Psychiatric: oriented to person, place and time  Assessment/Plan   68-year-old female patient with hiatal hernia and uncontrolled GERD.  I discussed with her and family member the Bravo pH test result showing abnormal acid exposure time of 11.5%, DeMeester score of 44 and  100% symptom association probability with heartburn, chest pain and regurgitation.  She is having daily heartburn and acid reflux despite Protonix 40 mg BID and at least 4-5 tums daily. EGD confirmed 4 cm hiatal hernia and LA grade B esophagitis. I recommended laparoscopic hiatal hernia repair with wrap. I have ordered esophageal manometry to assess esophageal motility. I explained to them the procedure, risks and complications which include but not limited to bleeding, infection, bowel injury, blood clot, vagus nerve injury, dysphagia requiring another procedure, gas bloat, excessive flatus and recurrence. We talked about the diet (clear liquid diet, full liquid and soft) and need to avoid bread, steak and salad. All questions answered and willing to proceed.         Sanjay Gomez MD 02/22/24 4:00 PM     3/11/2024  I discussed with patient the esophageal manometry showing normal esophageal motility. She will schedule hiatal hernia repair with wrap

## 2024-02-28 ENCOUNTER — LAB (OUTPATIENT)
Dept: LAB | Facility: LAB | Age: 69
End: 2024-02-28
Payer: COMMERCIAL

## 2024-02-28 DIAGNOSIS — N39.0 URINARY TRACT INFECTION WITHOUT HEMATURIA, SITE UNSPECIFIED: ICD-10-CM

## 2024-02-28 LAB
APPEARANCE UR: CLEAR
BILIRUB UR STRIP.AUTO-MCNC: NEGATIVE MG/DL
COLOR UR: YELLOW
GLUCOSE UR STRIP.AUTO-MCNC: NEGATIVE MG/DL
KETONES UR STRIP.AUTO-MCNC: NEGATIVE MG/DL
LEUKOCYTE ESTERASE UR QL STRIP.AUTO: NEGATIVE
NITRITE UR QL STRIP.AUTO: NEGATIVE
PH UR STRIP.AUTO: 6 [PH]
PROT UR STRIP.AUTO-MCNC: NEGATIVE MG/DL
RBC # UR STRIP.AUTO: NEGATIVE /UL
SP GR UR STRIP.AUTO: 1.01
UROBILINOGEN UR STRIP.AUTO-MCNC: <2 MG/DL

## 2024-02-28 PROCEDURE — 87086 URINE CULTURE/COLONY COUNT: CPT

## 2024-02-28 PROCEDURE — 81003 URINALYSIS AUTO W/O SCOPE: CPT

## 2024-02-29 LAB — BACTERIA UR CULT: NO GROWTH

## 2024-03-01 ENCOUNTER — HOSPITAL ENCOUNTER (OUTPATIENT)
Dept: GASTROENTEROLOGY | Facility: HOSPITAL | Age: 69
Setting detail: OUTPATIENT SURGERY
Discharge: HOME | End: 2024-03-01
Payer: COMMERCIAL

## 2024-03-01 VITALS
HEART RATE: 83 BPM | SYSTOLIC BLOOD PRESSURE: 174 MMHG | RESPIRATION RATE: 18 BRPM | OXYGEN SATURATION: 100 % | DIASTOLIC BLOOD PRESSURE: 75 MMHG

## 2024-03-01 DIAGNOSIS — K44.9 HIATAL HERNIA WITH GASTROESOPHAGEAL REFLUX DISEASE AND ESOPHAGITIS: ICD-10-CM

## 2024-03-01 DIAGNOSIS — K21.00 HIATAL HERNIA WITH GASTROESOPHAGEAL REFLUX DISEASE AND ESOPHAGITIS: ICD-10-CM

## 2024-03-01 PROCEDURE — 91010 ESOPHAGUS MOTILITY STUDY: CPT

## 2024-03-01 PROCEDURE — 91010 ESOPHAGUS MOTILITY STUDY: CPT | Performed by: INTERNAL MEDICINE

## 2024-03-01 RX ORDER — LIDOCAINE HYDROCHLORIDE 20 MG/ML
1 JELLY TOPICAL ONCE
Status: CANCELLED | OUTPATIENT
Start: 2024-03-01 | End: 2024-03-01

## 2024-03-05 ENCOUNTER — PROCEDURE VISIT (OUTPATIENT)
Dept: UROLOGY | Facility: CLINIC | Age: 69
End: 2024-03-05
Payer: COMMERCIAL

## 2024-03-05 VITALS
SYSTOLIC BLOOD PRESSURE: 153 MMHG | RESPIRATION RATE: 16 BRPM | HEART RATE: 80 BPM | DIASTOLIC BLOOD PRESSURE: 84 MMHG | WEIGHT: 131.61 LBS | BODY MASS INDEX: 22.59 KG/M2

## 2024-03-05 DIAGNOSIS — N20.0 NEPHROLITHIASIS: ICD-10-CM

## 2024-03-05 DIAGNOSIS — N30.00 ACUTE CYSTITIS WITHOUT HEMATURIA: ICD-10-CM

## 2024-03-05 DIAGNOSIS — N35.12 POSTINFECTIVE URETHRAL STRICTURE IN FEMALE: Primary | ICD-10-CM

## 2024-03-05 DIAGNOSIS — R35.0 INCREASED URINARY FREQUENCY: ICD-10-CM

## 2024-03-05 PROCEDURE — 51798 US URINE CAPACITY MEASURE: CPT | Performed by: UROLOGY

## 2024-03-05 PROCEDURE — 52281 CYSTOSCOPY AND TREATMENT: CPT | Performed by: UROLOGY

## 2024-03-05 PROCEDURE — 51741 ELECTRO-UROFLOWMETRY FIRST: CPT | Performed by: UROLOGY

## 2024-03-05 NOTE — PATIENT INSTRUCTIONS
Patient Discussion/Summary     It was very nice to see you again today. We had a long discussion regarding recurrent urinary tract infections and urethral strictures. You had a successful dilatation to 24 Albanian. This appears to be your limit, and we will therefore hold the size to 24 Albanian at your request and see you again in 7 weeks. You do have inflammation in the lower third of the bladder.       This note was created with voice-recognition software and was not corrected for typographical or grammatical errors

## 2024-03-05 NOTE — PROGRESS NOTES
"Patient ID: Keisha Mohamud is a 68 y.o. female.    Procedures  Pt took macrodantin 50mg po as prescribed  Anesthesia: Local 2% Lidocaine  Instruments: 6F flexible disposable cystoscope    Pt brought to procedure room and placed in dorsal lithotomy position. Pt draped and prepped in normal sterile fashion. 5ml lidocaine instilled into urethral meatus and 5ml instilled into vagina. Pt tolerated well.    I was present as chaperone for the entirety of the procedure   Eliza Mai  Cystoscopy performed by Dr. Dale Wagoner  Bedside \"Time Out\" Verification   Today's Date:  3/5/2024. I attest that this time out verification took place prior to the procedure.   Procedure: cysto/dilation   RN/LPN/MA:KAREN   Provider: WAL.   Verified By: RN/LPN/MA,  KAREN  and Provider.   Prior to the start of the procedure a time out was taken and the following were verified: the identity of the patient using two patient identifiers, the correct procedure, the correct site marked as indicated, the correct positioning for the patient and the correct equipment was obtained.   Cystoscopy - female KEISHA MOHAMUD      identified using two (2) forms of identification.   Procedure: diagnostic cystourethroscopy.  Procedure Note: Time Started: 5:45PM Time Completed: 6:05 PM  Indications for procedure: irritable voiding symptoms.   Discussed with patient: Risks, benefits, and alternative were discussed in detail. Patient appears to understand and agrees to proceed. Patient has signed the procedure consent form.    CYSTOSCOPY:    Cystoscopy today reveals a dense upward sloping urethral stricture dilated to 24 Iraqi with moderate pain.  No bleeding.  Once again, erythema confined to the bladder base and trigone.  Flow rate 12 cc/s, total volume 196 cc, PVR 47 cc.    "

## 2024-03-05 NOTE — PROGRESS NOTES
Provider Impressions     68 year-old white female self-referred, previous patient of Dr. Oliver Smallwood, for RECURRENT UTI AND BLADDER SPASMS. Urinalysis is negative for blood. CAT scan of the chest, abdomen and pelvis is negative. Renal bladder ultrasound identifies a 3 mm right RENAL CALCULUS. Urine culture was negative but then a second urine culture with was positive for Klebsiella, UTI. Patient retired in 2018, previously employed in [employer]. No family history of breast or prostate cancer. The patient has never smoked cigarettes.      MACROBID  allergy     April 17, 2023, patient arrives alone. She states she has had Recurrent UTIs with Urgency and Frequency. She states that she has had URETHRAL STRICTURES. We discussed the etiology of recurrent UTIs and poor bladder emptying. She still has her uterus and ovaries. She is sexually active. She takes showers, not tub baths. She does not use a swimming pool, hot tub or bicycle. She has been diagnosed with a URETHRAL STRICTURE in the past. We will initiate cystoscopy with urethral dilatation, flow studies and a discussion of treatment options. She may require an alpha agent. Urine culture was negative as well as urine cytology     May 19, 2023, CYSTOSCOPY WITH URETHRAL DILATATION a 20 Slovenian, flow studies and a discussion of treatment options. DENSE URETHRAL STRICTURE. Severe erythema in the lower half of the bladder. We will increase to 22 Slovenian and 2 months. Flow rate 9 cc/s PVR 0 cc. Urinalysis and urine culture were negative. We will not add an alpha agent for antibiotic at this time.     August 9, 2023, cystoscopy with urethral dilatation a 20 Slovenian. Attempts to 22 were unsuccessful due to pain. However the patient had a recent UTI and we will attempt that again at her next visit. Flow rate 2 cc/s, total volume 17 cc, PVR 0 cc. She states that after her first dilatation she noticed a dramatic improvement in flow and emptying. We will try to continue  to increase the size and strongly consider a low-dose daily cephalosporin if necessary.     August 26, 2023, cystoscopy with urethral dilatation to 22 South Korean of a dense urethral stricture upward sloping. Moderate discomfort with no bleeding. Significant erythema in the bladder base and trigone with a very clear line of demarcation. No tumors or stones. Flow rate 2 cc/s, total volume 19 cc, PVR 0 cc. Urinalysis is negative for blood and urine culture no growth. She will return in 4 weeks and we will increase the size to 24 South Korean at patient's request.     September 16, 2023, telephone call, urine culture was positive for E. coli treated with Bactrim     September 23, 2023, cystoscopy with dilatation of a dense urethral stricture to 24 [place] Korean with moderate bleeding and moderate pain. Erythema confined to the bladder base and trigone. We will not increase the size as this appears to be her limit. . I have added Bactrim single strength daily at this time.. She will return in 4 weeks and then we will increase times weekly     December 16, 2023, cystoscopy with dilatation of a dense urethral stricture to 24 South Korean with minimal bleeding and moderate pain. Erythema confined to the trigone. We will not increase the size as this appears to be her limit. . I have added Bactrim single strength daily at this time. She will return in 5 weeks and then we will increase times weekly     January 20, 2024, telephone call, urine culture is positive for E. coli.  Keflex ordered.  Resistant to Bactrim.     January 23, 2024, cystoscopy with dilatation of a dense urethral stricture to 24 South Korean with minimal pain and minimal bleeding.  Once again erythema confined to the trigone.  24 South Korean is the patient's limit.  We will change her daily Bactrim to Keflex as it was resistant to her most recent E. coli infection.  We will expand to 6-week regimen.    March 5, 2024, cystoscopy with dilatation of a dense upward sloping urethral stricture to  24 Bahraini with moderate discomfort.  Erythema confined to the bladder base and trigone.  The patient cannot tolerate dilation larger than 24 Bahraini.  She maintains her daily Keflex.  We will expand to 7 weeks.     PLAN:     1. The patient will return in 7 weeks  increase time periods weekly. for cystoscopy with urethral dilatation to 24 Bahraini, flow studies and a URINALYSIS AND URINE CULTURE. She will receive PROPHYLAXIS WITH KEFLEX 250 mg p.o. every 12 hours for 4 doses. We will hold the size at 24 Bahraini       #2  Keflex 250 mg p.o. daily. Transition to twice a week 500 mg in April 24.    3.  In April 2024, cystoscopy with dilatation of 24 Bahraini, flow rate, PVR, urinalysis, urine culture, renal colic CAT scan     Physical Exam  Vitals and nursing note reviewed. Exam conducted with a chaperone present.   Constitutional:       Appearance: Normal appearance.   HENT:      Head: Normocephalic and atraumatic.   Pulmonary:      Effort: Pulmonary effort is normal.   Abdominal:      Palpations: Abdomen is soft.      Tenderness: There is no abdominal tenderness.   Genitourinary:     General: Normal vulva.      Vagina: No vaginal discharge.   Musculoskeletal:         General: Normal range of motion.      Cervical back: Normal range of motion and neck supple.   Neurological:      General: No focal deficit present.      Mental Status: She is alert and oriented to person, place, and time.   Psychiatric:         Mood and Affect: Mood normal.         Behavior: Behavior normal.        This note was created with voice-recognition software and was not corrected for typographical or grammatical errors

## 2024-03-05 NOTE — LETTER
March 5, 2024     Natalie Lombardo MD  [address]  [facility] [address]    Patient: Kaelyn Mohamud   YOB: 1955   Date of Visit: 3/5/2024       Dear Dr. Natalie Lombardo MD:    Thank you for referring Kaelyn Mohamud to me for evaluation. Below are my notes for this consultation.  If you have questions, please do not hesitate to call me. I look forward to following your patient along with you.       Sincerely,     Dale Wagoner MD      CC: No Recipients  ______________________________________________________________________________________      Provider Impressions     68 year-old white female self-referred, previous patient of Dr. Oliver Smallwood, for RECURRENT UTI AND BLADDER SPASMS. Urinalysis is negative for blood. CAT scan of the chest, abdomen and pelvis is negative. Renal bladder ultrasound identifies a 3 mm right RENAL CALCULUS. Urine culture was negative but then a second urine culture with was positive for Klebsiella, UTI. Patient retired in 2018, previously employed in [employer]. No family history of breast or prostate cancer. The patient has never smoked cigarettes.      MACROBID  allergy     April 17, 2023, patient arrives alone. She states she has had Recurrent UTIs with Urgency and Frequency. She states that she has had URETHRAL STRICTURES. We discussed the etiology of recurrent UTIs and poor bladder emptying. She still has her uterus and ovaries. She is sexually active. She takes showers, not tub baths. She does not use a swimming pool, hot tub or bicycle. She has been diagnosed with a URETHRAL STRICTURE in the past. We will initiate cystoscopy with urethral dilatation, flow studies and a discussion of treatment options. She may require an alpha agent. Urine culture was negative as well as urine cytology     May 19, 2023, CYSTOSCOPY WITH URETHRAL DILATATION a 20 Turkish, flow studies and a discussion of treatment options. DENSE URETHRAL STRICTURE. Severe  erythema in the lower half of the bladder. We will increase to 22 Austrian and 2 months. Flow rate 9 cc/s PVR 0 cc. Urinalysis and urine culture were negative. We will not add an alpha agent for antibiotic at this time.     August 9, 2023, cystoscopy with urethral dilatation a 20 Austrian. Attempts to 22 were unsuccessful due to pain. However the patient had a recent UTI and we will attempt that again at her next visit. Flow rate 2 cc/s, total volume 17 cc, PVR 0 cc. She states that after her first dilatation she noticed a dramatic improvement in flow and emptying. We will try to continue to increase the size and strongly consider a low-dose daily cephalosporin if necessary.     August 26, 2023, cystoscopy with urethral dilatation to 22 Austrian of a dense urethral stricture upward sloping. Moderate discomfort with no bleeding. Significant erythema in the bladder base and trigone with a very clear line of demarcation. No tumors or stones. Flow rate 2 cc/s, total volume 19 cc, PVR 0 cc. Urinalysis is negative for blood and urine culture no growth. She will return in 4 weeks and we will increase the size to 24 Austrian at patient's request.     September 16, 2023, telephone call, urine culture was positive for E. coli treated with Bactrim     September 23, 2023, cystoscopy with dilatation of a dense urethral stricture to 24 Austrian with moderate bleeding and moderate pain. Erythema confined to the bladder base and trigone. We will not increase the size as this appears to be her limit. . I have added Bactrim single strength daily at this time.. She will return in 4 weeks and then we will increase times weekly     December 16, 2023, cystoscopy with dilatation of a dense urethral stricture to 24 Austrian with minimal bleeding and moderate pain. Erythema confined to the trigone. We will not increase the size as this appears to be her limit. . I have added Bactrim single strength daily at this time. She will return in 5 weeks and  then we will increase times weekly     January 20, 2024, telephone call, urine culture is positive for E. coli.  Keflex ordered.  Resistant to Bactrim.     January 23, 2024, cystoscopy with dilatation of a dense urethral stricture to 24 Liberian with minimal pain and minimal bleeding.  Once again erythema confined to the trigone.  24 Liberian is the patient's limit.  We will change her daily Bactrim to Keflex as it was resistant to her most recent E. coli infection.  We will expand to 6-week regimen.    March 5, 2024, cystoscopy with dilatation of a dense upward sloping urethral stricture to 24 Liberian with moderate discomfort.  Erythema confined to the bladder base and trigone.  The patient cannot tolerate dilation larger than 24 Liberian.  She maintains her daily Keflex.  We will expand to 7 weeks.     PLAN:     1. The patient will return in 7 weeks  increase time periods weekly. for cystoscopy with urethral dilatation to 24 Liberian, flow studies and a URINALYSIS AND URINE CULTURE. She will receive PROPHYLAXIS WITH KEFLEX 250 mg p.o. every 12 hours for 4 doses. We will hold the size at 24 Liberian       #2  Keflex 250 mg p.o. daily. Transition to twice a week 500 mg in April 24.    3.  In April 2024, cystoscopy with dilatation of 24 Liberian, flow rate, PVR, urinalysis, urine culture, renal colic CAT scan     Physical Exam  Vitals and nursing note reviewed. Exam conducted with a chaperone present.   Constitutional:       Appearance: Normal appearance.   HENT:      Head: Normocephalic and atraumatic.   Pulmonary:      Effort: Pulmonary effort is normal.   Abdominal:      Palpations: Abdomen is soft.      Tenderness: There is no abdominal tenderness.   Genitourinary:     General: Normal vulva.      Vagina: No vaginal discharge.   Musculoskeletal:         General: Normal range of motion.      Cervical back: Normal range of motion and neck supple.   Neurological:      General: No focal deficit present.      Mental Status: She is  alert and oriented to person, place, and time.   Psychiatric:         Mood and Affect: Mood normal.         Behavior: Behavior normal.        This note was created with voice-recognition software and was not corrected for typographical or grammatical errors

## 2024-03-07 PROCEDURE — 91037 ESOPH IMPED FUNCTION TEST: CPT | Performed by: INTERNAL MEDICINE

## 2024-03-11 PROBLEM — K21.00 HIATAL HERNIA WITH GASTROESOPHAGEAL REFLUX DISEASE AND ESOPHAGITIS: Status: ACTIVE | Noted: 2024-02-22

## 2024-03-11 PROBLEM — K44.9 HIATAL HERNIA WITH GASTROESOPHAGEAL REFLUX DISEASE AND ESOPHAGITIS: Status: ACTIVE | Noted: 2024-02-22

## 2024-03-11 RX ORDER — HEPARIN SODIUM 5000 [USP'U]/ML
5000 INJECTION, SOLUTION INTRAVENOUS; SUBCUTANEOUS ONCE
Status: CANCELLED | OUTPATIENT
Start: 2024-03-11 | End: 2024-03-11

## 2024-03-11 RX ORDER — CEFAZOLIN SODIUM 2 G/100ML
2 INJECTION, SOLUTION INTRAVENOUS ONCE
Status: CANCELLED | OUTPATIENT
Start: 2024-03-11 | End: 2024-03-11

## 2024-03-11 RX ORDER — SODIUM CHLORIDE, SODIUM LACTATE, POTASSIUM CHLORIDE, CALCIUM CHLORIDE 600; 310; 30; 20 MG/100ML; MG/100ML; MG/100ML; MG/100ML
100 INJECTION, SOLUTION INTRAVENOUS CONTINUOUS
Status: CANCELLED | OUTPATIENT
Start: 2024-03-11

## 2024-03-20 ENCOUNTER — LAB (OUTPATIENT)
Dept: LAB | Facility: LAB | Age: 69
End: 2024-03-20
Payer: COMMERCIAL

## 2024-03-20 ENCOUNTER — HOSPITAL ENCOUNTER (OUTPATIENT)
Dept: RADIOLOGY | Facility: HOSPITAL | Age: 69
Discharge: HOME | End: 2024-03-20
Payer: COMMERCIAL

## 2024-03-20 DIAGNOSIS — N20.0 NEPHROLITHIASIS: ICD-10-CM

## 2024-03-20 DIAGNOSIS — N30.00 ACUTE CYSTITIS WITHOUT HEMATURIA: ICD-10-CM

## 2024-03-20 LAB
APPEARANCE UR: CLEAR
BILIRUB UR STRIP.AUTO-MCNC: NEGATIVE MG/DL
COLOR UR: YELLOW
GLUCOSE UR STRIP.AUTO-MCNC: NEGATIVE MG/DL
KETONES UR STRIP.AUTO-MCNC: ABNORMAL MG/DL
LEUKOCYTE ESTERASE UR QL STRIP.AUTO: NEGATIVE
NITRITE UR QL STRIP.AUTO: NEGATIVE
PH UR STRIP.AUTO: 6 [PH]
PROT UR STRIP.AUTO-MCNC: NEGATIVE MG/DL
RBC # UR STRIP.AUTO: NEGATIVE /UL
SP GR UR STRIP.AUTO: 1.02
UROBILINOGEN UR STRIP.AUTO-MCNC: 2 MG/DL

## 2024-03-20 PROCEDURE — 81003 URINALYSIS AUTO W/O SCOPE: CPT

## 2024-03-20 PROCEDURE — 74176 CT ABD & PELVIS W/O CONTRAST: CPT

## 2024-03-20 PROCEDURE — 74176 CT ABD & PELVIS W/O CONTRAST: CPT | Performed by: RADIOLOGY

## 2024-03-20 PROCEDURE — 87086 URINE CULTURE/COLONY COUNT: CPT

## 2024-03-21 LAB — BACTERIA UR CULT: NORMAL

## 2024-03-22 NOTE — PREPROCEDURE INSTRUCTIONS
Reviewed pre-op instructions with patient including NPO after midnight, must have , hospital and check in location, and day of surgery routine.

## 2024-03-24 DIAGNOSIS — E78.5 HYPERLIPIDEMIA, UNSPECIFIED HYPERLIPIDEMIA TYPE: ICD-10-CM

## 2024-03-25 RX ORDER — ROSUVASTATIN CALCIUM 5 MG/1
5 TABLET, COATED ORAL DAILY
Qty: 90 TABLET | Refills: 1 | Status: SHIPPED | OUTPATIENT
Start: 2024-03-25 | End: 2024-06-03 | Stop reason: SDUPTHER

## 2024-03-26 ENCOUNTER — APPOINTMENT (OUTPATIENT)
Dept: CARDIOLOGY | Facility: HOSPITAL | Age: 69
End: 2024-03-26
Payer: COMMERCIAL

## 2024-03-26 ENCOUNTER — HOSPITAL ENCOUNTER (EMERGENCY)
Facility: HOSPITAL | Age: 69
Discharge: HOME | End: 2024-03-26
Payer: COMMERCIAL

## 2024-03-26 ENCOUNTER — APPOINTMENT (OUTPATIENT)
Dept: RADIOLOGY | Facility: HOSPITAL | Age: 69
End: 2024-03-26
Payer: COMMERCIAL

## 2024-03-26 VITALS
HEIGHT: 64 IN | WEIGHT: 130 LBS | DIASTOLIC BLOOD PRESSURE: 66 MMHG | BODY MASS INDEX: 22.2 KG/M2 | OXYGEN SATURATION: 98 % | SYSTOLIC BLOOD PRESSURE: 155 MMHG | RESPIRATION RATE: 18 BRPM | TEMPERATURE: 98.4 F | HEART RATE: 97 BPM

## 2024-03-26 DIAGNOSIS — S20.219A CONTUSION OF CHEST WALL, UNSPECIFIED LATERALITY, INITIAL ENCOUNTER: Primary | ICD-10-CM

## 2024-03-26 PROCEDURE — 71250 CT THORAX DX C-: CPT | Performed by: RADIOLOGY

## 2024-03-26 PROCEDURE — 93005 ELECTROCARDIOGRAM TRACING: CPT

## 2024-03-26 PROCEDURE — 99285 EMERGENCY DEPT VISIT HI MDM: CPT | Mod: 25 | Performed by: PHYSICIAN ASSISTANT

## 2024-03-26 PROCEDURE — 71250 CT THORAX DX C-: CPT

## 2024-03-26 ASSESSMENT — PAIN - FUNCTIONAL ASSESSMENT: PAIN_FUNCTIONAL_ASSESSMENT: 0-10

## 2024-03-26 ASSESSMENT — LIFESTYLE VARIABLES
EVER HAD A DRINK FIRST THING IN THE MORNING TO STEADY YOUR NERVES TO GET RID OF A HANGOVER: NO
HAVE PEOPLE ANNOYED YOU BY CRITICIZING YOUR DRINKING: NO
HAVE YOU EVER FELT YOU SHOULD CUT DOWN ON YOUR DRINKING: NO
TOTAL SCORE: 0
EVER FELT BAD OR GUILTY ABOUT YOUR DRINKING: NO

## 2024-03-26 ASSESSMENT — PAIN DESCRIPTION - ORIENTATION: ORIENTATION: MID

## 2024-03-26 ASSESSMENT — PAIN DESCRIPTION - LOCATION: LOCATION: CHEST

## 2024-03-26 ASSESSMENT — PAIN SCALES - GENERAL: PAINLEVEL_OUTOF10: 6

## 2024-03-27 ENCOUNTER — HOSPITAL ENCOUNTER (OUTPATIENT)
Facility: HOSPITAL | Age: 69
LOS: 1 days | Discharge: HOME | End: 2024-03-28
Attending: SURGERY | Admitting: SURGERY
Payer: COMMERCIAL

## 2024-03-27 ENCOUNTER — ANESTHESIA EVENT (OUTPATIENT)
Dept: OPERATING ROOM | Facility: HOSPITAL | Age: 69
End: 2024-03-27
Payer: COMMERCIAL

## 2024-03-27 ENCOUNTER — ANESTHESIA (OUTPATIENT)
Dept: OPERATING ROOM | Facility: HOSPITAL | Age: 69
End: 2024-03-27
Payer: COMMERCIAL

## 2024-03-27 DIAGNOSIS — K21.00 HIATAL HERNIA WITH GASTROESOPHAGEAL REFLUX DISEASE AND ESOPHAGITIS: Primary | ICD-10-CM

## 2024-03-27 DIAGNOSIS — K44.9 HIATAL HERNIA WITH GASTROESOPHAGEAL REFLUX DISEASE AND ESOPHAGITIS: Primary | ICD-10-CM

## 2024-03-27 LAB
ATRIAL RATE: 81 BPM
P AXIS: 70 DEGREES
P OFFSET: 187 MS
P ONSET: 141 MS
PR INTERVAL: 160 MS
Q ONSET: 221 MS
QRS COUNT: 14 BEATS
QRS DURATION: 96 MS
QT INTERVAL: 380 MS
QTC CALCULATION(BAZETT): 441 MS
QTC FREDERICIA: 419 MS
R AXIS: 52 DEGREES
T AXIS: 59 DEGREES
T OFFSET: 411 MS
VENTRICULAR RATE: 81 BPM

## 2024-03-27 PROCEDURE — 43281 LAP PARAESOPHAG HERN REPAIR: CPT | Performed by: SURGERY

## 2024-03-27 PROCEDURE — A4217 STERILE WATER/SALINE, 500 ML: HCPCS | Performed by: SURGERY

## 2024-03-27 PROCEDURE — 3700000002 HC GENERAL ANESTHESIA TIME - EACH INCREMENTAL 1 MINUTE: Performed by: SURGERY

## 2024-03-27 PROCEDURE — 2500000004 HC RX 250 GENERAL PHARMACY W/ HCPCS (ALT 636 FOR OP/ED): Performed by: SURGERY

## 2024-03-27 PROCEDURE — 7100000002 HC RECOVERY ROOM TIME - EACH INCREMENTAL 1 MINUTE: Performed by: SURGERY

## 2024-03-27 PROCEDURE — 3600000009 HC OR TIME - EACH INCREMENTAL 1 MINUTE - PROCEDURE LEVEL FOUR: Performed by: SURGERY

## 2024-03-27 PROCEDURE — 2500000002 HC RX 250 W HCPCS SELF ADMINISTERED DRUGS (ALT 637 FOR MEDICARE OP, ALT 636 FOR OP/ED): Performed by: SURGERY

## 2024-03-27 PROCEDURE — 2500000004 HC RX 250 GENERAL PHARMACY W/ HCPCS (ALT 636 FOR OP/ED): Performed by: STUDENT IN AN ORGANIZED HEALTH CARE EDUCATION/TRAINING PROGRAM

## 2024-03-27 PROCEDURE — 3700000001 HC GENERAL ANESTHESIA TIME - INITIAL BASE CHARGE: Performed by: SURGERY

## 2024-03-27 PROCEDURE — 3600000004 HC OR TIME - INITIAL BASE CHARGE - PROCEDURE LEVEL FOUR: Performed by: SURGERY

## 2024-03-27 PROCEDURE — 7100000001 HC RECOVERY ROOM TIME - INITIAL BASE CHARGE: Performed by: SURGERY

## 2024-03-27 PROCEDURE — 2720000007 HC OR 272 NO HCPCS: Performed by: SURGERY

## 2024-03-27 PROCEDURE — 1210000001 HC SEMI-PRIVATE ROOM DAILY

## 2024-03-27 PROCEDURE — 2500000005 HC RX 250 GENERAL PHARMACY W/O HCPCS: Performed by: STUDENT IN AN ORGANIZED HEALTH CARE EDUCATION/TRAINING PROGRAM

## 2024-03-27 PROCEDURE — 2500000001 HC RX 250 WO HCPCS SELF ADMINISTERED DRUGS (ALT 637 FOR MEDICARE OP): Performed by: SURGERY

## 2024-03-27 PROCEDURE — 2500000001 HC RX 250 WO HCPCS SELF ADMINISTERED DRUGS (ALT 637 FOR MEDICARE OP): Performed by: STUDENT IN AN ORGANIZED HEALTH CARE EDUCATION/TRAINING PROGRAM

## 2024-03-27 PROCEDURE — 43281 LAP PARAESOPHAG HERN REPAIR: CPT

## 2024-03-27 PROCEDURE — 2500000005 HC RX 250 GENERAL PHARMACY W/O HCPCS: Performed by: SURGERY

## 2024-03-27 RX ORDER — DOCUSATE SODIUM 100 MG/1
100 CAPSULE, LIQUID FILLED ORAL 2 TIMES DAILY
Status: DISCONTINUED | OUTPATIENT
Start: 2024-03-27 | End: 2024-03-28 | Stop reason: HOSPADM

## 2024-03-27 RX ORDER — DROPERIDOL 2.5 MG/ML
0.62 INJECTION, SOLUTION INTRAMUSCULAR; INTRAVENOUS ONCE AS NEEDED
Status: DISCONTINUED | OUTPATIENT
Start: 2024-03-27 | End: 2024-03-27 | Stop reason: HOSPADM

## 2024-03-27 RX ORDER — METOCLOPRAMIDE HYDROCHLORIDE 5 MG/ML
10 INJECTION INTRAMUSCULAR; INTRAVENOUS EVERY 6 HOURS PRN
Status: DISCONTINUED | OUTPATIENT
Start: 2024-03-27 | End: 2024-03-28 | Stop reason: HOSPADM

## 2024-03-27 RX ORDER — ACETAMINOPHEN 325 MG/1
650 TABLET ORAL EVERY 6 HOURS PRN
Status: DISCONTINUED | OUTPATIENT
Start: 2024-03-27 | End: 2024-03-28 | Stop reason: HOSPADM

## 2024-03-27 RX ORDER — OXYCODONE HYDROCHLORIDE 5 MG/1
5 TABLET ORAL EVERY 4 HOURS PRN
Status: DISCONTINUED | OUTPATIENT
Start: 2024-03-27 | End: 2024-03-28 | Stop reason: HOSPADM

## 2024-03-27 RX ORDER — MORPHINE SULFATE 2 MG/ML
2 INJECTION, SOLUTION INTRAMUSCULAR; INTRAVENOUS EVERY 4 HOURS PRN
Status: DISCONTINUED | OUTPATIENT
Start: 2024-03-27 | End: 2024-03-28 | Stop reason: HOSPADM

## 2024-03-27 RX ORDER — CEFAZOLIN SODIUM 2 G/100ML
2 INJECTION, SOLUTION INTRAVENOUS ONCE
Status: COMPLETED | OUTPATIENT
Start: 2024-03-27 | End: 2024-03-27

## 2024-03-27 RX ORDER — ONDANSETRON HYDROCHLORIDE 2 MG/ML
4 INJECTION, SOLUTION INTRAVENOUS EVERY 8 HOURS PRN
Status: DISCONTINUED | OUTPATIENT
Start: 2024-03-27 | End: 2024-03-28 | Stop reason: HOSPADM

## 2024-03-27 RX ORDER — SODIUM CHLORIDE, SODIUM LACTATE, POTASSIUM CHLORIDE, CALCIUM CHLORIDE 600; 310; 30; 20 MG/100ML; MG/100ML; MG/100ML; MG/100ML
100 INJECTION, SOLUTION INTRAVENOUS CONTINUOUS
Status: CANCELLED | OUTPATIENT
Start: 2024-03-27

## 2024-03-27 RX ORDER — SODIUM CHLORIDE, SODIUM LACTATE, POTASSIUM CHLORIDE, CALCIUM CHLORIDE 600; 310; 30; 20 MG/100ML; MG/100ML; MG/100ML; MG/100ML
100 INJECTION, SOLUTION INTRAVENOUS CONTINUOUS
Status: DISCONTINUED | OUTPATIENT
Start: 2024-03-27 | End: 2024-03-28

## 2024-03-27 RX ORDER — ROSUVASTATIN CALCIUM 10 MG/1
5 TABLET, COATED ORAL DAILY
Status: DISCONTINUED | OUTPATIENT
Start: 2024-03-27 | End: 2024-03-28 | Stop reason: HOSPADM

## 2024-03-27 RX ORDER — DIPHENHYDRAMINE HYDROCHLORIDE 50 MG/ML
12.5 INJECTION INTRAMUSCULAR; INTRAVENOUS ONCE AS NEEDED
Status: DISCONTINUED | OUTPATIENT
Start: 2024-03-27 | End: 2024-03-27 | Stop reason: HOSPADM

## 2024-03-27 RX ORDER — LABETALOL HYDROCHLORIDE 5 MG/ML
5 INJECTION, SOLUTION INTRAVENOUS ONCE AS NEEDED
Status: DISCONTINUED | OUTPATIENT
Start: 2024-03-27 | End: 2024-03-27 | Stop reason: HOSPADM

## 2024-03-27 RX ORDER — HYDROCODONE BITARTRATE AND ACETAMINOPHEN 5; 325 MG/1; MG/1
1 TABLET ORAL EVERY 6 HOURS PRN
Status: DISCONTINUED | OUTPATIENT
Start: 2024-03-27 | End: 2024-03-28 | Stop reason: HOSPADM

## 2024-03-27 RX ORDER — NALOXONE HYDROCHLORIDE 1 MG/ML
0.2 INJECTION INTRAMUSCULAR; INTRAVENOUS; SUBCUTANEOUS EVERY 5 MIN PRN
Status: DISCONTINUED | OUTPATIENT
Start: 2024-03-27 | End: 2024-03-28 | Stop reason: HOSPADM

## 2024-03-27 RX ORDER — ALUMINUM HYDROXIDE, MAGNESIUM HYDROXIDE, AND SIMETHICONE 1200; 120; 1200 MG/30ML; MG/30ML; MG/30ML
20 SUSPENSION ORAL 3 TIMES DAILY
Status: DISCONTINUED | OUTPATIENT
Start: 2024-03-27 | End: 2024-03-28 | Stop reason: HOSPADM

## 2024-03-27 RX ORDER — PHENYLEPHRINE HCL IN 0.9% NACL 1 MG/10 ML
SYRINGE (ML) INTRAVENOUS AS NEEDED
Status: DISCONTINUED | OUTPATIENT
Start: 2024-03-27 | End: 2024-03-27

## 2024-03-27 RX ORDER — ROCURONIUM BROMIDE 10 MG/ML
INJECTION, SOLUTION INTRAVENOUS AS NEEDED
Status: DISCONTINUED | OUTPATIENT
Start: 2024-03-27 | End: 2024-03-27

## 2024-03-27 RX ORDER — PROPOFOL 10 MG/ML
INJECTION, EMULSION INTRAVENOUS CONTINUOUS PRN
Status: DISCONTINUED | OUTPATIENT
Start: 2024-03-27 | End: 2024-03-27

## 2024-03-27 RX ORDER — MIDAZOLAM HYDROCHLORIDE 1 MG/ML
INJECTION, SOLUTION INTRAMUSCULAR; INTRAVENOUS AS NEEDED
Status: DISCONTINUED | OUTPATIENT
Start: 2024-03-27 | End: 2024-03-27

## 2024-03-27 RX ORDER — BUPIVACAINE HCL/EPINEPHRINE 0.25-.0005
VIAL (ML) INJECTION AS NEEDED
Status: DISCONTINUED | OUTPATIENT
Start: 2024-03-27 | End: 2024-03-27 | Stop reason: HOSPADM

## 2024-03-27 RX ORDER — PROPOFOL 10 MG/ML
INJECTION, EMULSION INTRAVENOUS AS NEEDED
Status: DISCONTINUED | OUTPATIENT
Start: 2024-03-27 | End: 2024-03-27

## 2024-03-27 RX ORDER — AZELASTINE 1 MG/ML
2 SPRAY, METERED NASAL DAILY
Status: DISCONTINUED | OUTPATIENT
Start: 2024-03-27 | End: 2024-03-28 | Stop reason: HOSPADM

## 2024-03-27 RX ORDER — ONDANSETRON HYDROCHLORIDE 2 MG/ML
INJECTION, SOLUTION INTRAVENOUS AS NEEDED
Status: DISCONTINUED | OUTPATIENT
Start: 2024-03-27 | End: 2024-03-27

## 2024-03-27 RX ORDER — FENTANYL CITRATE 50 UG/ML
INJECTION, SOLUTION INTRAMUSCULAR; INTRAVENOUS AS NEEDED
Status: DISCONTINUED | OUTPATIENT
Start: 2024-03-27 | End: 2024-03-27

## 2024-03-27 RX ORDER — SODIUM CHLORIDE 0.9 G/100ML
IRRIGANT IRRIGATION AS NEEDED
Status: DISCONTINUED | OUTPATIENT
Start: 2024-03-27 | End: 2024-03-27 | Stop reason: HOSPADM

## 2024-03-27 RX ORDER — FENTANYL CITRATE 50 UG/ML
50 INJECTION, SOLUTION INTRAMUSCULAR; INTRAVENOUS EVERY 5 MIN PRN
Status: DISCONTINUED | OUTPATIENT
Start: 2024-03-27 | End: 2024-03-27 | Stop reason: HOSPADM

## 2024-03-27 RX ORDER — ALBUTEROL SULFATE 90 UG/1
2 AEROSOL, METERED RESPIRATORY (INHALATION) EVERY 4 HOURS PRN
Status: DISCONTINUED | OUTPATIENT
Start: 2024-03-27 | End: 2024-03-28 | Stop reason: HOSPADM

## 2024-03-27 RX ORDER — PANTOPRAZOLE SODIUM 40 MG/1
40 TABLET, DELAYED RELEASE ORAL 2 TIMES DAILY
Status: DISCONTINUED | OUTPATIENT
Start: 2024-03-27 | End: 2024-03-28 | Stop reason: HOSPADM

## 2024-03-27 RX ORDER — SERTRALINE HYDROCHLORIDE 50 MG/1
25 TABLET, FILM COATED ORAL NIGHTLY
Status: DISCONTINUED | OUTPATIENT
Start: 2024-03-27 | End: 2024-03-28 | Stop reason: HOSPADM

## 2024-03-27 RX ORDER — LIDOCAINE HYDROCHLORIDE 20 MG/ML
INJECTION, SOLUTION INFILTRATION; PERINEURAL AS NEEDED
Status: DISCONTINUED | OUTPATIENT
Start: 2024-03-27 | End: 2024-03-27

## 2024-03-27 RX ORDER — HEPARIN SODIUM 5000 [USP'U]/ML
5000 INJECTION, SOLUTION INTRAVENOUS; SUBCUTANEOUS ONCE
Status: COMPLETED | OUTPATIENT
Start: 2024-03-27 | End: 2024-03-27

## 2024-03-27 RX ORDER — LIDOCAINE HYDROCHLORIDE 10 MG/ML
0.1 INJECTION, SOLUTION EPIDURAL; INFILTRATION; INTRACAUDAL; PERINEURAL ONCE
Status: DISCONTINUED | OUTPATIENT
Start: 2024-03-27 | End: 2024-03-27 | Stop reason: HOSPADM

## 2024-03-27 RX ADMIN — ALUMINUM HYDROXIDE, MAGNESIUM HYDROXIDE, AND SIMETHICONE 20 ML: 200; 200; 20 SUSPENSION ORAL at 20:56

## 2024-03-27 RX ADMIN — SODIUM CHLORIDE, POTASSIUM CHLORIDE, SODIUM LACTATE AND CALCIUM CHLORIDE 100 ML/HR: 600; 310; 30; 20 INJECTION, SOLUTION INTRAVENOUS at 16:44

## 2024-03-27 RX ADMIN — PROPOFOL 50 MCG/KG/MIN: 10 INJECTION, EMULSION INTRAVENOUS at 10:07

## 2024-03-27 RX ADMIN — FENTANYL CITRATE 100 MCG: 50 INJECTION, SOLUTION INTRAMUSCULAR; INTRAVENOUS at 07:35

## 2024-03-27 RX ADMIN — Medication 100 MCG: at 07:59

## 2024-03-27 RX ADMIN — SUGAMMADEX 200 MG: 100 INJECTION, SOLUTION INTRAVENOUS at 10:55

## 2024-03-27 RX ADMIN — DOCUSATE SODIUM 100 MG: 100 CAPSULE, LIQUID FILLED ORAL at 20:55

## 2024-03-27 RX ADMIN — CEFAZOLIN SODIUM 2 G: 2 INJECTION, SOLUTION INTRAVENOUS at 07:44

## 2024-03-27 RX ADMIN — SODIUM CHLORIDE, POTASSIUM CHLORIDE, SODIUM LACTATE AND CALCIUM CHLORIDE 100 ML/HR: 600; 310; 30; 20 INJECTION, SOLUTION INTRAVENOUS at 06:48

## 2024-03-27 RX ADMIN — ROCURONIUM BROMIDE 20 MG: 10 SOLUTION INTRAVENOUS at 09:48

## 2024-03-27 RX ADMIN — MORPHINE SULFATE 2 MG: 2 INJECTION, SOLUTION INTRAMUSCULAR; INTRAVENOUS at 20:54

## 2024-03-27 RX ADMIN — SERTRALINE HYDROCHLORIDE 25 MG: 50 TABLET, FILM COATED ORAL at 20:56

## 2024-03-27 RX ADMIN — OXYCODONE HYDROCHLORIDE 5 MG: 5 TABLET ORAL at 14:48

## 2024-03-27 RX ADMIN — PANTOPRAZOLE SODIUM 40 MG: 40 TABLET, DELAYED RELEASE ORAL at 20:56

## 2024-03-27 RX ADMIN — PROPOFOL 200 MG: 10 INJECTION, EMULSION INTRAVENOUS at 07:35

## 2024-03-27 RX ADMIN — ROSUVASTATIN CALCIUM 5 MG: 10 TABLET, FILM COATED ORAL at 20:55

## 2024-03-27 RX ADMIN — FENTANYL CITRATE 100 MCG: 50 INJECTION, SOLUTION INTRAMUSCULAR; INTRAVENOUS at 08:42

## 2024-03-27 RX ADMIN — DEXAMETHASONE SODIUM PHOSPHATE 8 MG: 4 INJECTION, SOLUTION INTRAMUSCULAR; INTRAVENOUS at 08:02

## 2024-03-27 RX ADMIN — FENTANYL CITRATE 50 MCG: 50 INJECTION, SOLUTION INTRAMUSCULAR; INTRAVENOUS at 14:47

## 2024-03-27 RX ADMIN — PROPOFOL 50 MG: 10 INJECTION, EMULSION INTRAVENOUS at 09:48

## 2024-03-27 RX ADMIN — ROCURONIUM BROMIDE 80 MG: 10 SOLUTION INTRAVENOUS at 07:36

## 2024-03-27 RX ADMIN — Medication 150 MCG: at 07:49

## 2024-03-27 RX ADMIN — ONDANSETRON 4 MG: 2 INJECTION, SOLUTION INTRAMUSCULAR; INTRAVENOUS at 10:30

## 2024-03-27 RX ADMIN — ACETAMINOPHEN 650 MG: 325 TABLET ORAL at 14:47

## 2024-03-27 RX ADMIN — MIDAZOLAM 2 MG: 1 INJECTION INTRAMUSCULAR; INTRAVENOUS at 07:29

## 2024-03-27 RX ADMIN — AZELASTINE HYDROCHLORIDE 2 SPRAY: 137 SPRAY, METERED NASAL at 20:55

## 2024-03-27 RX ADMIN — LIDOCAINE HYDROCHLORIDE 60 MG: 20 INJECTION, SOLUTION INFILTRATION; PERINEURAL at 07:35

## 2024-03-27 RX ADMIN — MORPHINE SULFATE 2 MG: 2 INJECTION, SOLUTION INTRAMUSCULAR; INTRAVENOUS at 16:44

## 2024-03-27 RX ADMIN — HEPARIN SODIUM 5000 UNITS: 5000 INJECTION INTRAVENOUS; SUBCUTANEOUS at 06:48

## 2024-03-27 RX ADMIN — FENTANYL CITRATE 50 MCG: 50 INJECTION, SOLUTION INTRAMUSCULAR; INTRAVENOUS at 10:21

## 2024-03-27 RX ADMIN — ALUMINUM HYDROXIDE, MAGNESIUM HYDROXIDE, AND SIMETHICONE 20 ML: 200; 200; 20 SUSPENSION ORAL at 16:44

## 2024-03-27 SDOH — SOCIAL STABILITY: SOCIAL INSECURITY: DOES ANYONE TRY TO KEEP YOU FROM HAVING/CONTACTING OTHER FRIENDS OR DOING THINGS OUTSIDE YOUR HOME?: NO

## 2024-03-27 SDOH — SOCIAL STABILITY: SOCIAL INSECURITY: HAVE YOU HAD THOUGHTS OF HARMING ANYONE ELSE?: NO

## 2024-03-27 SDOH — SOCIAL STABILITY: SOCIAL INSECURITY: DO YOU FEEL ANYONE HAS EXPLOITED OR TAKEN ADVANTAGE OF YOU FINANCIALLY OR OF YOUR PERSONAL PROPERTY?: NO

## 2024-03-27 SDOH — SOCIAL STABILITY: SOCIAL INSECURITY: HAS ANYONE EVER THREATENED TO HURT YOUR FAMILY OR YOUR PETS?: NO

## 2024-03-27 SDOH — SOCIAL STABILITY: SOCIAL INSECURITY: WERE YOU ABLE TO COMPLETE ALL THE BEHAVIORAL HEALTH SCREENINGS?: YES

## 2024-03-27 SDOH — SOCIAL STABILITY: SOCIAL INSECURITY: ARE THERE ANY APPARENT SIGNS OF INJURIES/BEHAVIORS THAT COULD BE RELATED TO ABUSE/NEGLECT?: NO

## 2024-03-27 SDOH — SOCIAL STABILITY: SOCIAL INSECURITY: ABUSE: ADULT

## 2024-03-27 SDOH — SOCIAL STABILITY: SOCIAL INSECURITY: DO YOU FEEL UNSAFE GOING BACK TO THE PLACE WHERE YOU ARE LIVING?: NO

## 2024-03-27 SDOH — SOCIAL STABILITY: SOCIAL INSECURITY: ARE YOU OR HAVE YOU BEEN THREATENED OR ABUSED PHYSICALLY, EMOTIONALLY, OR SEXUALLY BY ANYONE?: NO

## 2024-03-27 ASSESSMENT — LIFESTYLE VARIABLES
AUDIT TOTAL SCORE: 5
HAVE YOU OR SOMEONE ELSE BEEN INJURED AS A RESULT OF YOUR DRINKING: NO
HOW OFTEN DURING THE LAST YEAR HAVE YOU FAILED TO DO WHAT WAS NORMALLY EXPECTED FROM YOU BECAUSE OF DRINKING: NEVER
HOW OFTEN DURING THE LAST YEAR HAVE YOU HAD A FEELING OF GUILT OR REMORSE AFTER DRINKING: NEVER
HOW OFTEN DURING THE LAST YEAR HAVE YOU FOUND THAT YOU WERE NOT ABLE TO STOP DRINKING ONCE YOU HAD STARTED: NEVER
HAS A RELATIVE, FRIEND, DOCTOR, OR ANOTHER HEALTH PROFESSIONAL EXPRESSED CONCERN ABOUT YOUR DRINKING OR SUGGESTED YOU CUT DOWN: NO
HOW OFTEN DURING THE LAST YEAR HAVE YOU NEEDED AN ALCOHOLIC DRINK FIRST THING IN THE MORNING TO GET YOURSELF GOING AFTER A NIGHT OF HEAVY DRINKING: NEVER
HOW OFTEN DO YOU HAVE 6 OR MORE DRINKS ON ONE OCCASION: LESS THAN MONTHLY
HOW OFTEN DO YOU HAVE A DRINK CONTAINING ALCOHOL: 4 OR MORE TIMES A WEEK
AUDIT-C TOTAL SCORE: 5
SKIP TO QUESTIONS 9-10: 0
AUDIT TOTAL SCORE: 0
AUDIT-C TOTAL SCORE: 5
HOW OFTEN DURING THE LAST YEAR HAVE YOU BEEN UNABLE TO REMEMBER WHAT HAPPENED THE NIGHT BEFORE BECAUSE YOU HAD BEEN DRINKING: NEVER
HOW MANY STANDARD DRINKS CONTAINING ALCOHOL DO YOU HAVE ON A TYPICAL DAY: 1 OR 2

## 2024-03-27 ASSESSMENT — PATIENT HEALTH QUESTIONNAIRE - PHQ9
2. FEELING DOWN, DEPRESSED OR HOPELESS: NOT AT ALL
1. LITTLE INTEREST OR PLEASURE IN DOING THINGS: NOT AT ALL
SUM OF ALL RESPONSES TO PHQ9 QUESTIONS 1 & 2: 0

## 2024-03-27 ASSESSMENT — COGNITIVE AND FUNCTIONAL STATUS - GENERAL
STANDING UP FROM CHAIR USING ARMS: A LITTLE
MOVING TO AND FROM BED TO CHAIR: A LITTLE
CLIMB 3 TO 5 STEPS WITH RAILING: TOTAL
STANDING UP FROM CHAIR USING ARMS: A LITTLE
WALKING IN HOSPITAL ROOM: A LITTLE
DAILY ACTIVITIY SCORE: 18
TURNING FROM BACK TO SIDE WHILE IN FLAT BAD: A LITTLE
DRESSING REGULAR UPPER BODY CLOTHING: A LITTLE
HELP NEEDED FOR BATHING: A LITTLE
MOVING TO AND FROM BED TO CHAIR: A LITTLE
DRESSING REGULAR LOWER BODY CLOTHING: A LOT
DAILY ACTIVITIY SCORE: 19
WALKING IN HOSPITAL ROOM: A LOT
PERSONAL GROOMING: A LITTLE
TOILETING: A LITTLE
TURNING FROM BACK TO SIDE WHILE IN FLAT BAD: A LITTLE
PATIENT BASELINE BEDBOUND: NO
CLIMB 3 TO 5 STEPS WITH RAILING: A LOT
HELP NEEDED FOR BATHING: A LITTLE
MOBILITY SCORE: 17
DRESSING REGULAR UPPER BODY CLOTHING: A LITTLE
DRESSING REGULAR LOWER BODY CLOTHING: A LITTLE
MOVING FROM LYING ON BACK TO SITTING ON SIDE OF FLAT BED WITH BEDRAILS: A LITTLE
MOBILITY SCORE: 15
MOVING FROM LYING ON BACK TO SITTING ON SIDE OF FLAT BED WITH BEDRAILS: A LITTLE
TOILETING: A LITTLE
PERSONAL GROOMING: A LITTLE

## 2024-03-27 ASSESSMENT — PAIN - FUNCTIONAL ASSESSMENT
PAIN_FUNCTIONAL_ASSESSMENT: 0-10

## 2024-03-27 ASSESSMENT — PAIN SCALES - GENERAL
PAINLEVEL_OUTOF10: 3
PAINLEVEL_OUTOF10: 8
PAINLEVEL_OUTOF10: 7
PAINLEVEL_OUTOF10: 0 - NO PAIN
PAIN_LEVEL: 0
PAINLEVEL_OUTOF10: 0 - NO PAIN
PAINLEVEL_OUTOF10: 0 - NO PAIN
PAINLEVEL_OUTOF10: 4
PAINLEVEL_OUTOF10: 0 - NO PAIN
PAINLEVEL_OUTOF10: 7
PAINLEVEL_OUTOF10: 0 - NO PAIN
PAINLEVEL_OUTOF10: 0 - NO PAIN

## 2024-03-27 ASSESSMENT — ACTIVITIES OF DAILY LIVING (ADL)
HEARING - RIGHT EAR: FUNCTIONAL
WALKS IN HOME: INDEPENDENT
FEEDING YOURSELF: INDEPENDENT
GROOMING: INDEPENDENT
ADEQUATE_TO_COMPLETE_ADL: YES
BATHING: INDEPENDENT
JUDGMENT_ADEQUATE_SAFELY_COMPLETE_DAILY_ACTIVITIES: YES
TOILETING: NEEDS ASSISTANCE
LACK_OF_TRANSPORTATION: NO
DRESSING YOURSELF: INDEPENDENT
PATIENT'S MEMORY ADEQUATE TO SAFELY COMPLETE DAILY ACTIVITIES?: YES
HEARING - LEFT EAR: FUNCTIONAL

## 2024-03-27 ASSESSMENT — PAIN DESCRIPTION - DESCRIPTORS: DESCRIPTORS: SORE

## 2024-03-27 ASSESSMENT — COLUMBIA-SUICIDE SEVERITY RATING SCALE - C-SSRS
1. IN THE PAST MONTH, HAVE YOU WISHED YOU WERE DEAD OR WISHED YOU COULD GO TO SLEEP AND NOT WAKE UP?: NO
2. HAVE YOU ACTUALLY HAD ANY THOUGHTS OF KILLING YOURSELF?: NO
6. HAVE YOU EVER DONE ANYTHING, STARTED TO DO ANYTHING, OR PREPARED TO DO ANYTHING TO END YOUR LIFE?: NO

## 2024-03-27 ASSESSMENT — PAIN DESCRIPTION - ORIENTATION: ORIENTATION: MID

## 2024-03-27 ASSESSMENT — PAIN DESCRIPTION - LOCATION: LOCATION: ABDOMEN

## 2024-03-27 NOTE — OP NOTE
Repair Diaphragmatic Hernia Laparoscopy with TOUPE AND  EGD Operative Note     Date: 3/27/2024  OR Location: [facility]    Name: Kaelyn Mohamud, : 1955, Age: 68 y.o., MRN: [id number], Sex: female    Diagnosis  Pre-op Diagnosis     * Hiatal hernia with gastroesophageal reflux disease and esophagitis [K44.9, K21.00] Post-op Diagnosis     * Hiatal hernia with gastroesophageal reflux disease and esophagitis [K44.9, K21.00]     Procedures  Laparoscopic repair of moderate size type 3 paraesophageal hernia with Toupet wrap; gastroscopy       Surgeons      * Sanjay Gomez - Primary    Resident/Fellow/Other Assistant:  Surgeon(s) and Role:    Procedure Summary  Anesthesia: General  ASA: III  Anesthesia Staff: Anesthesiologist: Leonardo Gandhi DO  Estimated Blood Loss: minimal   Intra-op Medications:   Administrations occurring from 0730 to 1215 on 24:   Medication Name Total Dose   sodium chloride 0.9 % irrigation solution 2,000 mL   BUPivacaine-EPINEPHrine (Marcaine w/EPI) 0.25 %-1:200,000 injection 70 mL   lactated Ringer's infusion Cannot be calculated   ceFAZolin in dextrose (iso-os) (Ancef) IVPB 2 g 2 g              Anesthesia Record               Intraprocedure I/O Totals          Intake    Propofol Drip 16.06 mL    The total shown is the total volume documented since Anesthesia Start was filed.    lactated Ringer's infusion 1500.00 mL    ceFAZolin in dextrose (iso-os) (Ancef) IVPB 2 g 100.00 mL    Total Intake 1616.06 mL          Specimen: No specimens collected     Staff:   Circulator: Violet Patel RN  Scrub Person: Judie Starks    Findings: Moderate size type 3 paraesophageal hernia with herniated fat    INDICATIONS FOR PROCEDURE  67 y/o female patient with symptomatic hiatal hernia. Symptoms consist of heartburn and acid reflux despite Protonix 40 mg twice daily. Bravo PH showed DeMeester score of 44, AET of 11.5$ and 100% symptom association probability with heartburn, chest pain and  regurgitation.  Esophageal manometry shows normal esophageal motility. The patient was consented for laparoscopic hiatal hernia repair with Toupet wrap and EGD. I explained to the patient the procedure, the risks and complications which include but not limited to bleeding, infection, esophageal injury, gastric injury, vagus nerve injury, vascular injury, injury to surrounding structures, delayed gastric emptying requiring another surgery, dysphagia requiring dilation, excessive flatulence, gas and recurrence. All questions answered and she was willing to proceed.      DETAILS OF THE PROCEDURE:    After informed consent was obtained, the patient was brought into the operating room and placed in the supine position.  The huddle and time-out were performed.  General anesthesia was obtained without difficulty.  She got 5000 units of subQ heparin and 2 G of ancef. She was placed on the split legs table. Legs slightly abducted. All pressure points were padded and protected. Abdomen was prepped and draped with Chloraprep. A stab incision was made in the left upper quadrant.  A Veress needle was placed. Pneumoperitoneum was obtained with CO2 at 15 mmHg.  A 5 mm optical trocar was placed in the supra-umbilical region.  There was no evidence of iatrogenic injury. Two 5 mm trocars were placed along bilateral anterior axillary lines.  In the left upper quadrant, a 12 mm trocar was placed.  Another 5 mm trocar was placed in the right upper quadrant.  The liver retractor was placed. A moderate size, type 3 paraesophageal hernia with herniated stomach and fat were noted. The patient was placed in reverse Trendelenburg with the left side up.  The short gastrics were ligated with the LigaSure starting from the inferior pole of the spleen extending to the left benjamin of the diaphragm.  The phrenoesophageal ligament was dissected circumferentially. The retroesophageal window was created.  The Penrose drain was placed. Using the Ligasure  and suction device, I proceeded with mediastinal dissection allowing for 4-5 cm intraabdominal esophageal length.  Both the anterior and posterior vagus nerves were identified and preserved during my dissection. C02 decreased to 10 mmhg.  The crura were reapproximated with  0 Ethibond sutures using Autosuture device posteriorly, in a simple interrupted fashion. Four sutures placed posteriorly and one anteriorly. A marking stitch was placed at the posterior fundus which was brought around the esophagus to the right side.  A 56-Swiss lighted bougie was placed.  The shoeshine maneuver was performed and there was no tension on the fundus. I proceeded to create a 2 to 3 cm Toupet wrap securing the esophagus to the fundus using three 0-ethibond sutures on either side. The wrap was then anchored to the crura at the 6 0'clock position.  I then broke scrub and the adult gastroscope was placed in the oral cavity, advanced to the stomach. No mucosal injury. A retroflexion view was performed.  The wrap was intact.  Gastric content and air were then suctioned out.  I scrubbed back into the case.  The left upper quadrant 12 mm trocar site was reapproximated with #1 PDS with a Andrew-Mahesh in a figure-of-eight fashion. The liver retractor was removed.  Lucille block performed. The CO2 was evacuated.  The skin was reapproximated with 4-0 Monocryl in a subcuticular fashion. LiquiBand was placed.  The patient was extubated.  I spoke to the patient's family about the intraoperative findings.  All questions were answered.     Attending Attestation:     Sanjay Gomez  Phone Number: [phone]

## 2024-03-27 NOTE — H&P
"History Of Present Illness  68-year-old female patient well-known to me. Chronic history of heartburn, acid reflux and chest pain. Hiatal hernia noted on previous EGD. She continues to have GERD symptoms despite Protonix 40 mg BID.  On February 5, she underwent EGD with Bravo pH test. Endoscopically, she was noted to have LA grade B esophagitis and 4 cm hiatal hernia.  Acid exposure time (AET) at 11.5%, DeMeester score 44 and symptom association probability for heartburn, chest pain and regurgitation was 100%. In addition to the Protonix BID, she is taking 5-6 tums daily. Her coffee especially is causing pressure in the epigastric region. Had nausea during the Bravo test but none currently. Also patient has been coughing for weeks and currently on Medrol Dosepak by PCP. Denies tobacco use but admits to occasional ETOH use. Drinks at least a cup of coffee daily.   Past Medical History  Past Medical History:   Diagnosis Date    Allergies     Anxiety     Arthritis     Diverticulosis     GERD (gastroesophageal reflux disease)     Hiatal hernia     Hyperlipidemia     Plantar fascial fibromatosis 10/06/2020    Plantar fasciitis of left foot    Urinary tract infection     Vertigo     Wears dentures     upper    Wears glasses        Surgical History  Past Surgical History:   Procedure Laterality Date    ABCESS DRAINAGE      perianal    ABSCESS DRAINAGE      \"skin abscess\"    CATARACT EXTRACTION      CHOLECYSTECTOMY      COLONOSCOPY      FOOT SURGERY      plantar fascitis    UPPER GASTROINTESTINAL ENDOSCOPY      US ASPIRATION INJECTION INTERMEDIATE JOINT  01/25/2022    US ASPIRATION INJECTION INTERMEDIATE JOINT 1/25/2022 [facility]        Social History  She reports that she has never smoked. She has never used smokeless tobacco. She reports current alcohol use of about 4.0 standard drinks of alcohol per week. She reports that she does not use drugs.    Family History  Family History   Problem Relation Name Age of " "Onset    Lung cancer Mother's Sister          Allergies  Animal dander, Cat dander, Dog dander, House dust, Kenalog [triamcinolone acetonide], Meperidine, Nitrofurantoin monohyd/m-cryst, Sulfa (sulfonamide antibiotics), Tetracycline, and Tetracyclines    Review of Systems     Physical Exam   Constitutional: no acute distress, well appearing and well nourished  Pulmonary: normal respiratory effort; clear to auscultation bilaterally, no wheezes or bronchi   Cardiovascular: regular rate and rhythm, no murmurs or extra-heart sounds; pedal pulses are normal; no extremities edema or varicosities, no peripheral edema  Abdomen: soft, non-tender, non-distended; no organomegaly; no peritoneal sign, no hernia  Musculoskeletal: digits and nails normal without clubbing or cyanosis; Joints, bones and muscles are normal with normal range of motion; muscle strength/tone is normal  Skin: normal without rashes or lesion  Neurologic: cranial nerve II-XII intact grossly; normal gait  Psychiatric: oriented to person, place and time  Last Recorded Vitals  Blood pressure (!) 179/93, pulse 76, temperature 36.5 °C (97.7 °F), temperature source Temporal, resp. rate 16, height 1.626 m (5' 4\"), weight 58.4 kg (128 lb 12 oz), SpO2 98 %.    Relevant Results         Assessment/Plan   68-year-old female patient with hiatal hernia and uncontrolled GERD.  I discussed with her and family member the Bravo pH test result showing abnormal acid exposure time of 11.5%, DeMeester score of 44 and 100% symptom association probability with heartburn, chest pain and regurgitation.  She is having daily heartburn and acid reflux despite Protonix 40 mg BID and at least 4-5 tums daily. EGD confirmed 4 cm hiatal hernia and LA grade B esophagitis. I recommended laparoscopic hiatal hernia repair with wrap. Esophageal manometry shows normal esophageal motility. I explained to them the procedure, risks and complications which include but not limited to bleeding, " infection, bowel injury, blood clot, vagus nerve injury, dysphagia requiring another procedure, gas bloat, excessive flatus and recurrence. We talked about the diet (clear liquid diet, full liquid and soft) and need to avoid bread, steak and salad. All questions answered and willing to proceed.     Sanjay Gomez MD

## 2024-03-27 NOTE — ED PROVIDER NOTES
HPI   Chief Complaint   Patient presents with    Chest Injury     Tripped and hit her chest on the arm of her couch last night.  Came in tonight for an xray because she is scheduled to have hiatal hernia repair in the am.  She did not notify Dr Mcmanus       68-year-old female presenting to the ER today feeling sore in the front of her chest after an injury when she fell yesterday at 8:30 PM.  Patient states that she was walking through her house, she tripped on an area rug and she fell forward and hit her chest against the armrest of her couch.  She denies hitting her head or LOC and she tells me she is not on a blood thinner.  She states that she has felt very sore when she goes to move her chest today but has not noticed any bruising or swelling over the chest.  She is not feeling short of breath and denies any hemoptysis.  She states that she is not having any neck pain or back pain from the fall and she did not have any injury to the chest.  She tells me that she simply tripped and fell and did not have a syncopal episode nor was she dizzy prior to this event.  She is scheduled for surgery in the morning for her hiatal hernia so she just wanted to make sure everything was okay because she wants them to proceed with the surgery.  She is not having any abdominal pain, nausea or vomiting.  She states that she noticed a scratch on her left forearm but is not having any bony pain or difficulty moving the arm.  No further complaints at this time.      History provided by:  Patient                      [place] Coma Scale Score: 15                     Patient History   Past Medical History:   Diagnosis Date    Allergies     Anxiety     Arthritis     Diverticulosis     GERD (gastroesophageal reflux disease)     Hiatal hernia     Hyperlipidemia     Plantar fascial fibromatosis 10/06/2020    Plantar fasciitis of left foot    Urinary tract infection     Vertigo     Wears dentures     upper    Wears glasses      Past Surgical  "History:   Procedure Laterality Date    ABCESS DRAINAGE      perianal    ABSCESS DRAINAGE      \"skin abscess\"    CATARACT EXTRACTION      CHOLECYSTECTOMY      COLONOSCOPY      FOOT SURGERY      plantar fascitis    UPPER GASTROINTESTINAL ENDOSCOPY      US ASPIRATION INJECTION INTERMEDIATE JOINT  01/25/2022    US ASPIRATION INJECTION INTERMEDIATE JOINT 1/25/2022 [facility]     Family History   Problem Relation Name Age of Onset    Lung cancer Mother's Sister       Social History     Tobacco Use    Smoking status: Never    Smokeless tobacco: Never   Vaping Use    Vaping Use: Never used   Substance Use Topics    Alcohol use: Yes     Alcohol/week: 4.0 standard drinks of alcohol     Types: 2 Cans of beer, 2 Shots of liquor per week    Drug use: Never       Physical Exam   ED Triage Vitals [03/26/24 2101]   Temperature Heart Rate Respirations BP   36.9 °C (98.4 °F) 97 18 155/66      Pulse Ox Temp Source Heart Rate Source Patient Position   98 % Temporal -- Sitting      BP Location FiO2 (%)     Right arm --       Physical Exam  Constitutional:       General: She is not in acute distress.  Eyes:      Extraocular Movements: Extraocular movements intact.      Conjunctiva/sclera: Conjunctivae normal.      Pupils: Pupils are equal, round, and reactive to light.   Cardiovascular:      Rate and Rhythm: Normal rate and regular rhythm.      Pulses: Normal pulses.      Heart sounds: Normal heart sounds.   Pulmonary:      Effort: Pulmonary effort is normal.      Breath sounds: Normal breath sounds.   Abdominal:      General: Bowel sounds are normal. There is no distension.      Palpations: Abdomen is soft.      Tenderness: There is no abdominal tenderness. There is no guarding or rebound.      Comments: No signs of trauma to the abdomen or flanks.   Musculoskeletal:      Cervical back: Normal range of motion and neck supple.      Comments: Normal gait and strength tone, no scalp or facial bony tenderness or signs of trauma.  " No midline tenderness, step-offs, paraspinal tenderness or signs of trauma to the cervical, thoracic and lumbar spines.  5/5 strength and sensation to extremities without bony tenderness or bony deformity.  Abrasion as below.  Mild tenderness over the mid anterior chest wall/sternum without crepitus or signs of deformity.  No further chest wall tenderness on exam. NVI   Skin:     General: Skin is warm.      Capillary Refill: Capillary refill takes less than 2 seconds.      Comments: Superficial linear abrasion to the  volar aspect of the right mid forearm.  No surrounding crepitus or erythema, no discharge or bleeding.   Neurological:      General: No focal deficit present.      Mental Status: She is alert and oriented to person, place, and time.      Cranial Nerves: No cranial nerve deficit.      Sensory: No sensory deficit.      Motor: No weakness.      Coordination: Coordination normal.      Gait: Gait normal.         ED Course & MDM   Diagnoses as of 03/26/24 2318   Contusion of chest wall, unspecified laterality, initial encounter       Medical Decision Making  68-year-old female presenting to the ER today with soreness in her chest and an abrasion on her right forearm.  This happened when she tripped and fell on an area rug yesterday at home at 8:30 PM and hit her chest on the armrest of her couch.  There was no head injury or LOC and she is not on a blood thinner.  She states that she does feel sore when she touches her chest or when she moves and she is scheduled for hiatal hernia surgery in the morning so she wanted to make sure everything was okay.  She arrives afebrile with stable vital signs and she denies any further complaints.  On my exam she does have an abrasion on the right forearm but there is no bony tenderness or bony deformity and she is neurovascularly intact.  She also has some mild tenderness through the mid chest wall over the sternum but there is no crepitus or signs of trauma.  Her exam  is otherwise within normal limits and there are no further tenderness or signs of trauma on exam.  EKG and CT chest are ordered.    ECG per my interpretation normal sinus rhythm at 81 bpm without acute ST-T wave changes or arrhythmia.  CT chest today shows no acute fracture but there is some patchy groundglass opacities scattered throughout the right lung that could be infectious versus inflammatory in process, component of pulmonary contusion not excluded.  Patient's vital signs are normal and she is not endorsing any shortness of breath, we did obtain a walking pulse oximetry on the patient and her O2 saturation is staying between 96 to 99%.  I did reach out to trauma/surgery on-call Dr. Briscoe, who states that the patient is safe for discharge home but she should make them aware of this injury prior to her surgery in the morning.  On reassessment patient is resting comfortably.  I did discuss her results today, diagnosis and treatment plan.  She is feeling well, she states that she does not have any shortness of breath but I discussed that she still needs to make them aware of this injury and CT findings today and she expressed understanding and agreed with this plan of care.    Labs Reviewed - No data to display    CT chest wo IV contrast   Final Result   Patchy ground-glass opacities scattered throughout the right lung.   Findings are favored to represent an infectious or inflammatory   process. A component of pulmonary contusion is not entirely excluded   in the setting of trauma, although no additional CT evidence for a   traumatic injury is appreciated.             MACRO:   None.        Signed by: Evan Finkelstein 3/26/2024 10:48 PM   Dictation workstation:   [id number]            Procedure  Procedures     Catherine Schuler PA-C  03/26/24 0208

## 2024-03-27 NOTE — ANESTHESIA PROCEDURE NOTES
Airway  Date/Time: 3/27/2024 7:40 AM  Urgency: elective    Airway not difficult    Staffing  Performed: attending   Authorized by: Leonardo Gandhi DO    Performed by: Leonardo Gandhi DO  Patient location during procedure: OR    Indications and Patient Condition  Indications for airway management: anesthesia  Sedation level: deep  Preoxygenated: yes      Final Airway Details  Final airway type: endotracheal airway      Successful airway: ETT     Successful intubation technique: direct laryngoscopy  Facilitating devices/methods: intubating stylet and cricoid pressure  Blade: Louisa  Blade size: #3  ETT size (mm): 7.0  Cormack-Lehane Classification: grade I - full view of glottis  Measured from: lips  ETT to lips (cm): 22  Number of attempts at approach: 1

## 2024-03-27 NOTE — ANESTHESIA PROCEDURE NOTES
Peripheral IV  Date/Time: 3/27/2024 7:45 AM  Inserted by: Leonardo Gandhi DO    Placement  Needle size: 20 G  Laterality: left  Location: hand  Site prep: alcohol  Attempts: 1

## 2024-03-27 NOTE — ANESTHESIA PREPROCEDURE EVALUATION
Patient: Kaelyn Mohamud    Procedure Information       Date/Time: 03/27/24 0730    Procedure: Repair Diaphragmatic Hernia Laparoscopy, special equipment egd scope --- 2 assistant needed - special equipment egd  scope --- 2  assistant needed    Location: [facility]  / [facility] [place]    Surgeons: Sanjay Gomez MD            Relevant Problems   Cardiac   (+) Hyperlipidemia      Neuro   (+) Anxiety   (+) Anxiety and depression   (+) Depression   (+) Piriformis syndrome      GI   (+) Chronic diarrhea of unknown origin   (+) GERD (gastroesophageal reflux disease)   (+) Hiatal hernia with gastroesophageal reflux disease and esophagitis      /Renal   (+) UTI (urinary tract infection)      Liver   (+) Cholecystitis, chronic      Hematology   (+) Anemia      HEENT   (+) Seasonal allergies   (+) Sinus congestion      ID   (+) URTI (acute upper respiratory infection)   (+) UTI (urinary tract infection)       Clinical information reviewed:   Tobacco  Allergies  Meds   Med Hx  Surg Hx  OB Status  Fam Hx  Soc   Hx        NPO Detail:  NPO/Void Status  Carbohydrate Drink Given Prior to Surgery? : N  Date of Last Liquid: 03/26/24  Time of Last Liquid: 2345  Date of Last Solid: 03/26/24  Time of Last Solid: 1800  Last Intake Type: Clear fluids  Time of Last Void: 0647         Physical Exam    Airway  Mallampati: I     Cardiovascular   Rhythm: regular  Rate: normal     Dental    Pulmonary - normal exam     Abdominal - normal exam             Anesthesia Plan    History of general anesthesia?: yes  History of complications of general anesthesia?: no    ASA 3     general     intravenous induction   Postoperative administration of opioids is intended.  Anesthetic plan and risks discussed with patient.

## 2024-03-27 NOTE — NURSING NOTE
Patient states she fell at home last evening and hit her chest on the arm of a chair. Was discharged from the ER. CT chest done in ER with out contrast.

## 2024-03-27 NOTE — ANESTHESIA POSTPROCEDURE EVALUATION
Patient: Kaelyn Mohamud    Procedure Summary       Date: 03/27/24 Room / Location: [facility] / [facility]    Anesthesia Start: 0729 Anesthesia Stop: 1107    Procedure: Repair Diaphragmatic Hernia Laparoscopy with TOUPE AND  EGD Diagnosis:       Hiatal hernia with gastroesophageal reflux disease and esophagitis      (Hiatal hernia with gastroesophageal reflux disease and esophagitis [K44.9, K21.00])    Surgeons: Sanjay Gomez MD Responsible Provider: Leonardo Gandhi DO    Anesthesia Type: general ASA Status: 3            Anesthesia Type: general    Vitals Value Taken Time   /64 03/27/24 1107   Temp 36.2 03/27/24 1107   Pulse 84 03/27/24 1107   Resp 15 03/27/24 1107   SpO2 100 03/27/24 1107       Anesthesia Post Evaluation    Patient location during evaluation: bedside  Patient participation: complete - patient participated  Level of consciousness: awake and alert  Pain score: 0  Pain management: adequate  Airway patency: patent  Cardiovascular status: acceptable  Respiratory status: acceptable  Hydration status: acceptable  Postoperative Nausea and Vomiting: none        There were no known notable events for this encounter.

## 2024-03-27 NOTE — CARE PLAN
The patient's goals for the shift include  have  reduced pain    The clinical goals for the shift include  have reduced pain

## 2024-03-28 VITALS
WEIGHT: 128.75 LBS | HEART RATE: 83 BPM | TEMPERATURE: 97.5 F | HEIGHT: 64 IN | DIASTOLIC BLOOD PRESSURE: 64 MMHG | OXYGEN SATURATION: 94 % | BODY MASS INDEX: 21.98 KG/M2 | SYSTOLIC BLOOD PRESSURE: 119 MMHG | RESPIRATION RATE: 16 BRPM

## 2024-03-28 PROBLEM — K21.00 HIATAL HERNIA WITH GERD AND ESOPHAGITIS: Status: ACTIVE | Noted: 2024-03-28

## 2024-03-28 PROBLEM — K44.9 HIATAL HERNIA WITH GERD AND ESOPHAGITIS: Status: ACTIVE | Noted: 2024-03-28

## 2024-03-28 PROCEDURE — 7100000011 HC EXTENDED STAY RECOVERY HOURLY - NURSING UNIT

## 2024-03-28 PROCEDURE — 2500000004 HC RX 250 GENERAL PHARMACY W/ HCPCS (ALT 636 FOR OP/ED): Performed by: SURGERY

## 2024-03-28 PROCEDURE — 2500000001 HC RX 250 WO HCPCS SELF ADMINISTERED DRUGS (ALT 637 FOR MEDICARE OP): Performed by: SURGERY

## 2024-03-28 PROCEDURE — 99232 SBSQ HOSP IP/OBS MODERATE 35: CPT

## 2024-03-28 PROCEDURE — G0378 HOSPITAL OBSERVATION PER HR: HCPCS

## 2024-03-28 RX ORDER — SIMETHICONE 80 MG
80 TABLET,CHEWABLE ORAL EVERY 6 HOURS PRN
Qty: 30 TABLET | Refills: 0 | Status: SHIPPED | OUTPATIENT
Start: 2024-03-28

## 2024-03-28 RX ORDER — DOCUSATE SODIUM 100 MG/1
100 CAPSULE, LIQUID FILLED ORAL 2 TIMES DAILY
Qty: 60 CAPSULE | Refills: 0 | Status: SHIPPED | OUTPATIENT
Start: 2024-03-28 | End: 2024-04-27

## 2024-03-28 RX ORDER — HYDROCODONE BITARTRATE AND ACETAMINOPHEN 5; 325 MG/1; MG/1
1 TABLET ORAL EVERY 6 HOURS PRN
Qty: 20 TABLET | Refills: 0 | Status: SHIPPED | OUTPATIENT
Start: 2024-03-28 | End: 2024-06-03 | Stop reason: ALTCHOICE

## 2024-03-28 RX ORDER — ONDANSETRON 4 MG/1
4 TABLET, ORALLY DISINTEGRATING ORAL EVERY 8 HOURS PRN
Qty: 12 TABLET | Refills: 0 | Status: SHIPPED | OUTPATIENT
Start: 2024-03-28

## 2024-03-28 RX ADMIN — SODIUM CHLORIDE, POTASSIUM CHLORIDE, SODIUM LACTATE AND CALCIUM CHLORIDE 100 ML/HR: 600; 310; 30; 20 INJECTION, SOLUTION INTRAVENOUS at 00:48

## 2024-03-28 RX ADMIN — MORPHINE SULFATE 2 MG: 2 INJECTION, SOLUTION INTRAMUSCULAR; INTRAVENOUS at 05:18

## 2024-03-28 RX ADMIN — DOCUSATE SODIUM 100 MG: 100 CAPSULE, LIQUID FILLED ORAL at 09:04

## 2024-03-28 RX ADMIN — ALUMINUM HYDROXIDE, MAGNESIUM HYDROXIDE, AND SIMETHICONE 20 ML: 200; 200; 20 SUSPENSION ORAL at 09:05

## 2024-03-28 RX ADMIN — MORPHINE SULFATE 2 MG: 2 INJECTION, SOLUTION INTRAMUSCULAR; INTRAVENOUS at 00:56

## 2024-03-28 RX ADMIN — HYDROCODONE BITARTRATE AND ACETAMINOPHEN 1 TABLET: 5; 325 TABLET ORAL at 15:13

## 2024-03-28 RX ADMIN — ALUMINUM HYDROXIDE, MAGNESIUM HYDROXIDE, AND SIMETHICONE 20 ML: 200; 200; 20 SUSPENSION ORAL at 15:10

## 2024-03-28 RX ADMIN — PANTOPRAZOLE SODIUM 40 MG: 40 TABLET, DELAYED RELEASE ORAL at 09:05

## 2024-03-28 RX ADMIN — HYDROCODONE BITARTRATE AND ACETAMINOPHEN 1 TABLET: 5; 325 TABLET ORAL at 09:05

## 2024-03-28 ASSESSMENT — PAIN DESCRIPTION - LOCATION
LOCATION: ABDOMEN

## 2024-03-28 ASSESSMENT — PAIN - FUNCTIONAL ASSESSMENT
PAIN_FUNCTIONAL_ASSESSMENT: 0-10

## 2024-03-28 ASSESSMENT — PAIN SCALES - GENERAL
PAINLEVEL_OUTOF10: 7
PAINLEVEL_OUTOF10: 6
PAINLEVEL_OUTOF10: 6
PAINLEVEL_OUTOF10: 4
PAINLEVEL_OUTOF10: 8
PAINLEVEL_OUTOF10: 3

## 2024-03-28 ASSESSMENT — COGNITIVE AND FUNCTIONAL STATUS - GENERAL
MOVING TO AND FROM BED TO CHAIR: A LITTLE
DRESSING REGULAR UPPER BODY CLOTHING: A LITTLE
CLIMB 3 TO 5 STEPS WITH RAILING: A LOT
HELP NEEDED FOR BATHING: A LITTLE
TURNING FROM BACK TO SIDE WHILE IN FLAT BAD: A LITTLE
DAILY ACTIVITIY SCORE: 18
MOBILITY SCORE: 18
PERSONAL GROOMING: A LITTLE
STANDING UP FROM CHAIR USING ARMS: A LITTLE
TOILETING: A LITTLE
WALKING IN HOSPITAL ROOM: A LITTLE
DRESSING REGULAR LOWER BODY CLOTHING: A LOT

## 2024-03-28 ASSESSMENT — PAIN DESCRIPTION - DESCRIPTORS: DESCRIPTORS: SORE

## 2024-03-28 ASSESSMENT — PAIN DESCRIPTION - ORIENTATION: ORIENTATION: MID

## 2024-03-28 NOTE — DISCHARGE INSTRUCTIONS
Avoid straws, carbonation, salads, steak, bread  Follow-up full liquid diet for 5 days, then follow a soft, low fiber diet for 1 week  If you do not have a bowel movement, take 2 caps of MiraLAX as discussed with Dr. Gomez

## 2024-03-28 NOTE — CARE PLAN
Problem: Pain  Goal: My pain/discomfort is manageable  3/28/2024 1335 by Danette Castillo RN  Outcome: Adequate for Discharge  3/28/2024 0802 by Danette Castillo RN  Flowsheets (Taken 3/27/2024 2323 by Mattie Pro, RN)  Resident's pain/discomfort is manageable:   Include resident/family/caregiver in decisions related to pain management   Administer pain medication prior to activities that may trigger pain   Offer non-pharmacological pain management interventions   Identify and avoid pain triggers     Problem: Safety  Goal: Patient will be injury free during hospitalization  Outcome: Adequate for Discharge  Goal: I will remain free of falls  3/28/2024 1335 by Danette Castillo RN  Outcome: Adequate for Discharge  3/28/2024 0802 by Danette Castillo RN  Flowsheets (Taken 3/27/2024 2323 by Mattie Pro, WALT)  Resident will remain free of falls:   Utilize fall response kit   Apply bed/chair alarms as appropriate   Assist with toileting as orderd   Maintain bed at position as ordered (chair height, low bed)   Accompany resident as ordered (ex. 1:1, stand-by assist, dayroom monitoring, 15 minute checks, line of sight)   Visual checks per facility policy     Problem: Daily Care  Goal: Daily care needs are met  3/28/2024 1335 by Danette Castillo RN  Outcome: Adequate for Discharge  3/28/2024 0802 by Danette Castillo RN  Flowsheets (Taken 3/27/2024 2323 by Mattie Pro, RN)  Daily care needs are met:   Assess and monitor ability to perform self care and identify potential discharge needs   Assist patient with activities of daily living as needed   Assess skin integrity/risk for skin breakdown   Encourage independent activity per ability     Problem: Discharge Barriers  Goal: My discharge needs are met  3/28/2024 1335 by Danette Castillo RN  Outcome: Adequate for Discharge  3/28/2024 0802 by Danette Castillo RN  Flowsheets (Taken 3/27/2024 2323 by Mattie Pro, WALT)  Resident's discharge needs are met:   Identify potential  discharge barriers on admission and throughout stay   Involve resident/family/caregiver in discharge planning process     Problem: Pain - Adult  Goal: Verbalizes/displays adequate comfort level or baseline comfort level  3/28/2024 1335 by Danette Castillo RN  Outcome: Adequate for Discharge  3/28/2024 0802 by Danette Castillo RN  Flowsheets (Taken 3/27/2024 2323 by Mattie Pro, RN)  Verbalizes/displays adequate comfort level or baseline comfort level:   Encourage patient to monitor pain and request assistance   Administer analgesics based on type and severity of pain and evaluate response   Assess pain using appropriate pain scale   Implement non-pharmacological measures as appropriate and evaluate response   Consider cultural and social influences on pain and pain management     Problem: Safety - Adult  Goal: Free from fall injury  3/28/2024 1335 by Danette Castillo RN  Outcome: Adequate for Discharge  3/28/2024 0802 by Danette Castillo RN  Flowsheets (Taken 3/27/2024 2323 by Mattie Pro, RN)  Free from fall injury:   Instruct family/caregiver on patient safety   Based on caregiver fall risk screen, instruct family/caregiver to ask for assistance with transferring infant if caregiver noted to have fall risk factors     Problem: Discharge Planning  Goal: Discharge to home or other facility with appropriate resources  3/28/2024 1335 by Danette Castillo RN  Outcome: Adequate for Discharge  3/28/2024 0802 by Danette Castillo RN  Flowsheets (Taken 3/27/2024 2323 by Mattie Pro, WALT)  Discharge to home or other facility with appropriate resources:   Identify barriers to discharge with patient and caregiver   Identify discharge learning needs (meds, wound care, etc)   Refer to discharge planning if patient needs post-hospital services based on physician order or complex needs related to functional status, cognitive ability or social support system   Arrange for needed discharge resources and transportation as  appropriate     Problem: Chronic Conditions and Co-morbidities  Goal: Patient's chronic conditions and co-morbidity symptoms are monitored and maintained or improved  3/28/2024 1335 by Danette Castillo RN  Outcome: Adequate for Discharge  3/28/2024 0802 by Danette Castillo RN  Flowsheets (Taken 3/27/2024 9523 by Mattie Pro RN)  Care Plan - Patient's Chronic Conditions and Co-Morbidity Symptoms are Monitored and Maintained or Improved:   Monitor and assess patient's chronic conditions and comorbid symptoms for stability, deterioration, or improvement   Collaborate with multidisciplinary team to address chronic and comorbid conditions and prevent exacerbation or deterioration   Update acute care plan with appropriate goals if chronic or comorbid symptoms are exacerbated and prevent overall improvement and discharge     Problem: Pain  Goal: Takes deep breaths with improved pain control throughout the shift  Outcome: Adequate for Discharge  Goal: Turns in bed with improved pain control throughout the shift  Outcome: Adequate for Discharge  Goal: Walks with improved pain control throughout the shift  Outcome: Adequate for Discharge  Goal: Performs ADL's with improved pain control throughout shift  Outcome: Adequate for Discharge  Goal: Participates in PT with improved pain control throughout the shift  Outcome: Adequate for Discharge     Problem: Fall/Injury  Goal: Not fall by end of shift  Outcome: Adequate for Discharge  Goal: Be free from injury by end of the shift  Outcome: Adequate for Discharge  Goal: Verbalize understanding of personal risk factors for fall in the hospital  Outcome: Adequate for Discharge  Goal: Verbalize understanding of risk factor reduction measures to prevent injury from fall in the home  Outcome: Adequate for Discharge  Goal: Use assistive devices by end of the shift  Outcome: Adequate for Discharge  Goal: Pace activities to prevent fatigue by end of the shift  Outcome: Adequate for  Discharge   The patient's goals for the shift include remain safe and free from fall.    The clinical goals for the shift include Patient's pain will be managed to a tolerable level throughout this shift.    Over the shift, the patient did make progress adequate for discharge toward care plan goals.

## 2024-03-28 NOTE — CARE PLAN
The patient's goals for the shift include Remain safe and free from fall    The clinical goals for the shift include Pain management    Over the shift, the patient did not make progress toward the following goals. Barriers to progression include recent surgical procedure. Recommendations to address these barriers include assist with ADL and ambulation, offer non pharmacologic interventions, give pain meds as indicated.    Problem: Pain  Goal: My pain/discomfort is manageable  Outcome: Progressing  Flowsheets (Taken 3/27/2024 2323)  Resident's pain/discomfort is manageable:   Include resident/family/caregiver in decisions related to pain management   Administer pain medication prior to activities that may trigger pain   Offer non-pharmacological pain management interventions   Identify and avoid pain triggers     Problem: Safety  Goal: Patient will be injury free during hospitalization  Outcome: Progressing  Goal: I will remain free of falls  Outcome: Progressing  Flowsheets (Taken 3/27/2024 2323)  Resident will remain free of falls:   Utilize fall response kit   Apply bed/chair alarms as appropriate   Assist with toileting as orderd   Maintain bed at position as ordered (chair height, low bed)   Accompany resident as ordered (ex. 1:1, stand-by assist, dayroom monitoring, 15 minute checks, line of sight)   Visual checks per facility policy     Problem: Daily Care  Goal: Daily care needs are met  Outcome: Progressing  Flowsheets (Taken 3/27/2024 2323)  Daily care needs are met:   Assess and monitor ability to perform self care and identify potential discharge needs   Assist patient with activities of daily living as needed   Assess skin integrity/risk for skin breakdown   Encourage independent activity per ability     Problem: Discharge Barriers  Goal: My discharge needs are met  Outcome: Progressing  Flowsheets (Taken 3/27/2024 2323)  Resident's discharge needs are met:   Identify potential discharge barriers on  admission and throughout stay   Involve resident/family/caregiver in discharge planning process     Problem: Pain - Adult  Goal: Verbalizes/displays adequate comfort level or baseline comfort level  Outcome: Progressing  Flowsheets (Taken 3/27/2024 2323)  Verbalizes/displays adequate comfort level or baseline comfort level:   Encourage patient to monitor pain and request assistance   Administer analgesics based on type and severity of pain and evaluate response   Assess pain using appropriate pain scale   Implement non-pharmacological measures as appropriate and evaluate response   Consider cultural and social influences on pain and pain management     Problem: Safety - Adult  Goal: Free from fall injury  Outcome: Progressing  Flowsheets (Taken 3/27/2024 2323)  Free from fall injury:   Instruct family/caregiver on patient safety   Based on caregiver fall risk screen, instruct family/caregiver to ask for assistance with transferring infant if caregiver noted to have fall risk factors     Problem: Discharge Planning  Goal: Discharge to home or other facility with appropriate resources  Outcome: Progressing  Flowsheets (Taken 3/27/2024 2323)  Discharge to home or other facility with appropriate resources:   Identify barriers to discharge with patient and caregiver   Identify discharge learning needs (meds, wound care, etc)   Refer to discharge planning if patient needs post-hospital services based on physician order or complex needs related to functional status, cognitive ability or social support system   Arrange for needed discharge resources and transportation as appropriate     Problem: Chronic Conditions and Co-morbidities  Goal: Patient's chronic conditions and co-morbidity symptoms are monitored and maintained or improved  Outcome: Progressing  Flowsheets (Taken 3/27/2024 2323)  Care Plan - Patient's Chronic Conditions and Co-Morbidity Symptoms are Monitored and Maintained or Improved:   Monitor and assess  patient's chronic conditions and comorbid symptoms for stability, deterioration, or improvement   Collaborate with multidisciplinary team to address chronic and comorbid conditions and prevent exacerbation or deterioration   Update acute care plan with appropriate goals if chronic or comorbid symptoms are exacerbated and prevent overall improvement and discharge     Problem: Pain  Goal: Takes deep breaths with improved pain control throughout the shift  Outcome: Progressing  Goal: Turns in bed with improved pain control throughout the shift  Outcome: Progressing  Goal: Walks with improved pain control throughout the shift  Outcome: Progressing  Goal: Performs ADL's with improved pain control throughout shift  Outcome: Progressing  Goal: Participates in PT with improved pain control throughout the shift  Outcome: Progressing     Problem: Fall/Injury  Goal: Not fall by end of shift  Outcome: Progressing  Goal: Be free from injury by end of the shift  Outcome: Progressing  Goal: Verbalize understanding of personal risk factors for fall in the hospital  Outcome: Progressing  Goal: Verbalize understanding of risk factor reduction measures to prevent injury from fall in the home  Outcome: Progressing  Goal: Use assistive devices by end of the shift  Outcome: Progressing  Goal: Pace activities to prevent fatigue by end of the shift  Outcome: Progressing

## 2024-03-28 NOTE — PROGRESS NOTES
General Surgery Progress Note  Patient: Kaelyn Mohamud  Unit/Bed: 1107/1107-A  YOB: 1955  MRN: [id number]  Acct: [id number]   Admitting Diagnosis: Hiatal hernia with gastroesophageal reflux disease and esophagitis [K44.9, K21.00]  Date:  3/27/2024  Hospital Day: 1  Attending: Sanjay Gomez MD     No chief complaint on file.       SUBJECTIVE    Patient seen and examined this morning.  No acute events overnight.  Vital signs are stable.  She reports expected postoperative tenderness.  States she plans on taking a walk soon as she is having gas pain.  Pulled approximately 1000 cc on incentive spirometer. States she is passing gas.  Patient denies chest pain, shortness of breath, nausea, vomiting, diarrhea, headache, fever, chills.    VITALS      Vitals:    03/27/24 1500 03/27/24 1648 03/27/24 1955 03/28/24 0413   BP:  124/74 121/63 119/64   BP Location:   Left arm Left arm   Patient Position:   Lying Lying   Pulse: 82 86 88 83   Resp: 22 22 22 16   Temp:  37.1 °C (98.8 °F) 37.2 °C (99 °F) 36.4 °C (97.5 °F)   TempSrc:   Temporal Temporal   SpO2: 95% 95% 93% 94%   Weight:       Height:           Intake/Output Summary (Last 24 hours) at 3/28/2024 0840  Last data filed at 3/28/2024 0432  Gross per 24 hour   Intake 3559.39 ml   Output 600 ml   Net 2959.39 ml      Wt Readings from Last 4 Encounters:   03/27/24 58.4 kg (128 lb 12 oz)   03/26/24 59 kg (130 lb)   03/05/24 59.7 kg (131 lb 9.8 oz)   02/22/24 59 kg (130 lb)        Allergies:  Allergies   Allergen Reactions    Animal Dander Other     Sneezing -itcy eyes-occassionally wheezing    Cat Dander Unknown     Sneezing-itchy eyes-occasionally will cause wheezing    Dog Dander Unknown     Sneezing-itching-occasionally wheezing    House Dust Unknown     Sneezing-itching-occasionally whezing    Kenalog [Triamcinolone Acetonide] Other     Nausea/vomiting-generalized pain-feeling of tiredness-very sick from    Meperidine Unknown and GI Upset      Nausea-vomiting-diarrhea    Nitrofurantoin Monohyd/M-Cryst Hives    Sulfa (Sulfonamide Antibiotics) Rash and Swelling    Tetracycline Hives, Nausea And Vomiting, Nausea Only and Nausea/vomiting    Tetracyclines Rash     As child        PHYSICAL EXAM   Physical Exam  Constitutional:       General: She is not in acute distress.  HENT:      Mouth/Throat:      Mouth: Mucous membranes are moist.   Eyes:      Pupils: Pupils are equal, round, and reactive to light.   Cardiovascular:      Rate and Rhythm: Normal rate and regular rhythm.      Pulses: Normal pulses.   Pulmonary:      Effort: Pulmonary effort is normal.   Abdominal:      General: Abdomen is flat. Bowel sounds are normal.      Palpations: Abdomen is soft.      Tenderness: There is abdominal tenderness.      Comments: Expected postoperative tenderness.  Laparotomy sites clean, dry, and intact.   Musculoskeletal:         General: Normal range of motion.   Skin:     General: Skin is warm and dry.      Capillary Refill: Capillary refill takes less than 2 seconds.   Neurological:      Mental Status: She is alert and oriented to person, place, and time.   Psychiatric:         Mood and Affect: Mood normal.           LABS       alum-mag hydroxide-simeth, 20 mL, oral, TID  azelastine, 2 spray, Each Nostril, Daily  docusate sodium, 100 mg, oral, BID  pantoprazole, 40 mg, oral, BID  rosuvastatin, 5 mg, oral, Daily  sertraline, 25 mg, oral, Nightly       lactated Ringer's, 100 mL/hr, Last Rate: Stopped (03/27/24 1105)  lactated Ringer's, 100 mL/hr, Last Rate: 100 mL/hr (03/28/24 0432)       Current Outpatient Medications   Medication Instructions    albuterol (Ventolin HFA) 90 mcg/actuation inhaler 2 puffs, inhalation, Every 4 hours PRN    azelastine (Astelin) 137 mcg (0.1 %) nasal spray Use 2 spray(s) in each nostril once daily    cephalexin (KEFLEX) 250 mg, oral, Daily    cranberry conc-ascorbic acid 140-100 mg capsule oral    denosumab (PROLIA) 60 mg, subcutaneous,  Every 6 months    Lacto no.25-Xevsrr-DPK-larch 25B cell-25B cell-50 mg capsule 1 capsule, oral, Nightly    pantoprazole (PROTONIX) 40 mg, oral, 2 times daily    rosuvastatin (CRESTOR) 5 mg, oral, Daily    sertraline (Zoloft) 50 mg tablet TAKE 1/2 (ONE-HALF) TABLET BY MOUTH ONCE DAILY FOR 10 DAYS THEN  INCREASE  TO  1  TABLET  DAILY       Assessment     Patient Active Problem List   Diagnosis    Acute diarrhea    Acute diverticulitis of intestine    Allergic rhinitis    Allergies    Anemia    Anxiety    Anxiety and depression    Back pain    Cavovarus deformity of foot    Cervical pain    Cholecystitis, chronic    Chronic diarrhea of unknown origin    Cough    Depression    Diverticulitis of large intestine with abscess without bleeding    Dyspnea    Elevated blood pressure reading    Essential tremor    Fatigue    Foot pain    Gall bladder polyp    Generalized weakness    GERD (gastroesophageal reflux disease)    Headache    Hyperlipidemia    Impairment of balance    Increased urinary frequency    Insomnia    Irritable mood    Joint pain, knee    Lower abdominal pain    Metatarsalgia, left foot    Muscle spasm    Myopathy    Nausea and/or vomiting    Neck pain    Pain with urination    Patellofemoral pain syndrome of left knee    Piriformis syndrome    Plantar fasciitis of left foot    Posterior tibial tendinitis, left    Post-nasal drainage    Right foot pain    Right upper quadrant abdominal pain    Right lower quadrant abdominal pain    S/P laparoscopic cholecystectomy    Seasonal allergies    Sinus congestion    Thrombocytosis    URTI (acute upper respiratory infection)    UTI (urinary tract infection)    Vitamin D deficiency    Wheezing    Sprain, finger    Injury of ring finger, right, initial encounter    Medicare annual wellness visit, subsequent    Blunt head trauma    Routine general medical examination at health care facility    Postinfective urethral stricture in female    Hiatal hernia with  gastroesophageal reflux disease and esophagitis          Plan:    POD 1 hiatal hernia repair  -Doing well postoperatively.  Afebrile.  Vital signs stable.  Denies nausea.  -Pain well-controlled  -Nausea well-controlled  -Tolerating full liquid diet, will DC fluids  -Anticipate discharge later today after assessed by Dr. Gomez        Electronically signed by CHADD Blanca on 3/28/2024 at 8:40 AM

## 2024-03-28 NOTE — DISCHARGE SUMMARY
Discharge Diagnosis  Hiatal hernia with gastroesophageal reflux disease and esophagitis    Issues Requiring Follow-Up  Follow up in 1 week with Dr. Gomez    Test Results Pending At Discharge  Pending Labs       No current pending labs.            Hospital Course   68 year old female presented to the clinic with GERD. Hiatal hernia was observed on EGD. After risks versus benefits were discussed it was decided to proceed with a hiatal hernia repair. Patient did well post operatively. Pain was well controlled. She tolerated a full liquid diet. Hospitalization and procedure were without complication. Patient will be discharged in stable condition.     Home Medications     Medication List      START taking these medications     docusate sodium 100 mg capsule; Commonly known as: Colace; Take 1   capsule (100 mg) by mouth 2 times a day.   HYDROcodone-acetaminophen 5-325 mg tablet; Commonly known as: Norco;   Take 1 tablet by mouth every 6 hours if needed for moderate pain (4 - 6).   ondansetron ODT 4 mg disintegrating tablet; Commonly known as:   Zofran-ODT; Take 1 tablet (4 mg) by mouth every 8 hours if needed for   nausea or vomiting.   simethicone 80 mg chewable tablet; Commonly known as: Mylicon; Chew 1   tablet (80 mg) every 6 hours if needed for flatulence.     CHANGE how you take these medications     sertraline 50 mg tablet; Commonly known as: Zoloft; TAKE 1/2 (ONE-HALF)   TABLET BY MOUTH ONCE DAILY FOR 10 DAYS THEN  INCREASE  TO  1  TABLET    DAILY; What changed: See the new instructions.     CONTINUE taking these medications     albuterol 90 mcg/actuation inhaler; Commonly known as: Ventolin HFA;   Inhale 2 puffs every 4 hours if needed for wheezing or shortness of   breath.   cephalexin 250 mg capsule; Commonly known as: Keflex; Take 1 capsule   (250 mg) by mouth early in the morning..   cranberry conc-ascorbic acid 140-100 mg capsule   Lacto no.94-Giuntu-FWC-larch 25B cell-25B cell-50 mg capsule   pantoprazole  40 mg EC tablet; Commonly known as: ProtoNix   rosuvastatin 5 mg tablet; Commonly known as: Crestor; Take 1 tablet by   mouth once daily     STOP taking these medications     azelastine 137 mcg (0.1 %) nasal spray; Commonly known as: Astelin     ASK your doctor about these medications     denosumab 60 mg/mL syringe; Commonly known as: Prolia; Inject 1 mL (60   mg total) under the skin every 6 months.       Outpatient Follow-Up  Future Appointments   Date Time Provider Department Center   4/23/2024  3:00 PM Dale Wagoner MD [id number] [place]   6/21/2024 10:00 AM Natalie Lombardo MD [id number] [place]   7/23/2024 11:15 AM  [id number]YN NURSE RESOURCE [id number] [place]       MALIK Blanca-CNP

## 2024-04-08 ENCOUNTER — OFFICE VISIT (OUTPATIENT)
Dept: SURGERY | Facility: CLINIC | Age: 69
End: 2024-04-08
Payer: COMMERCIAL

## 2024-04-08 VITALS
BODY MASS INDEX: 21.68 KG/M2 | SYSTOLIC BLOOD PRESSURE: 124 MMHG | WEIGHT: 127 LBS | HEIGHT: 64 IN | DIASTOLIC BLOOD PRESSURE: 70 MMHG

## 2024-04-08 DIAGNOSIS — Z87.19 H/O HIATAL HERNIA: Primary | ICD-10-CM

## 2024-04-08 PROCEDURE — 1159F MED LIST DOCD IN RCRD: CPT | Performed by: SURGERY

## 2024-04-08 PROCEDURE — 1036F TOBACCO NON-USER: CPT | Performed by: SURGERY

## 2024-04-08 PROCEDURE — 1160F RVW MEDS BY RX/DR IN RCRD: CPT | Performed by: SURGERY

## 2024-04-08 PROCEDURE — 1111F DSCHRG MED/CURRENT MED MERGE: CPT | Performed by: SURGERY

## 2024-04-08 PROCEDURE — 99024 POSTOP FOLLOW-UP VISIT: CPT | Performed by: SURGERY

## 2024-04-08 RX ORDER — HYDROCODONE BITARTRATE AND ACETAMINOPHEN 5; 325 MG/1; MG/1
1 TABLET ORAL EVERY 6 HOURS PRN
Qty: 20 TABLET | Refills: 0 | Status: SHIPPED | OUTPATIENT
Start: 2024-04-08 | End: 2024-04-15

## 2024-04-08 NOTE — PROGRESS NOTES
Subjective   Patient ID: Kaelyn Mohamud is a 68 y.o. female who presents for No chief complaint on file..  HPI  68-year-old female patient who underwent laparoscopic repair of a moderate-sized type III paraesophageal hernia with toupet wrap on March 27; she is here in follow-up; she is having sharp pain over the left upper quadrant incision sit which is limiting her activities. She has completed the Norco. Denies nausea, vomiting, heartburn and acid reflux.  Her cough has resolved. She reports solid foods going down slowly.    Objective   Physical Exam  Abd: soft, non-distended; moderate tenderness over the LUQ incision; incisions approximated  Assessment/Plan   Satisfactory postoperative course. The patient continues to do well but still having incisional pain. She is eating small and frequent meals.  Norco 5/3/2025 is ordered.  She will alternated the Norco with NSAID.  No lifting more than 10 pounds for another month.  Follow-up in 1 month         Sanjay Gomez MD 04/08/24 11:18 AM

## 2024-04-10 ENCOUNTER — APPOINTMENT (OUTPATIENT)
Dept: PRIMARY CARE | Facility: CLINIC | Age: 69
End: 2024-04-10
Payer: COMMERCIAL

## 2024-04-16 ENCOUNTER — OFFICE VISIT (OUTPATIENT)
Dept: PRIMARY CARE | Facility: CLINIC | Age: 69
End: 2024-04-16
Payer: COMMERCIAL

## 2024-04-16 VITALS
DIASTOLIC BLOOD PRESSURE: 70 MMHG | SYSTOLIC BLOOD PRESSURE: 124 MMHG | BODY MASS INDEX: 21.63 KG/M2 | TEMPERATURE: 97.9 F | HEART RATE: 64 BPM | WEIGHT: 126 LBS

## 2024-04-16 DIAGNOSIS — F41.9 ANXIETY: ICD-10-CM

## 2024-04-16 DIAGNOSIS — E78.2 MIXED HYPERLIPIDEMIA: ICD-10-CM

## 2024-04-16 DIAGNOSIS — D47.3 ESSENTIAL (HEMORRHAGIC) THROMBOCYTHEMIA (MULTI): Primary | ICD-10-CM

## 2024-04-16 DIAGNOSIS — D75.839 THROMBOCYTOSIS: ICD-10-CM

## 2024-04-16 PROCEDURE — 1036F TOBACCO NON-USER: CPT | Performed by: INTERNAL MEDICINE

## 2024-04-16 PROCEDURE — 1159F MED LIST DOCD IN RCRD: CPT | Performed by: INTERNAL MEDICINE

## 2024-04-16 PROCEDURE — 1160F RVW MEDS BY RX/DR IN RCRD: CPT | Performed by: INTERNAL MEDICINE

## 2024-04-16 PROCEDURE — 99213 OFFICE O/P EST LOW 20 MIN: CPT | Performed by: INTERNAL MEDICINE

## 2024-04-16 PROCEDURE — 1111F DSCHRG MED/CURRENT MED MERGE: CPT | Performed by: INTERNAL MEDICINE

## 2024-04-16 ASSESSMENT — ENCOUNTER SYMPTOMS
RESPIRATORY NEGATIVE: 1
ENDOCRINE NEGATIVE: 1
CONSTITUTIONAL NEGATIVE: 1
GASTROINTESTINAL NEGATIVE: 1
EYES NEGATIVE: 1

## 2024-04-16 NOTE — PROGRESS NOTES
Subjective   Patient ID: Kaelyn Mohamud is a 68 y.o. female who presents for Follow-up (Pt here for office visit to review CT).    HPI patient had hiatal hernia surgery done her cough has disappeared she had the blunt trauma to her chest she went to ER and there was no rib fracture was some abnormality of lung suggestive of pneumonia but she is doing fine and lung exam is clear now    Review of Systems   Constitutional: Negative.    HENT: Negative.     Eyes: Negative.    Respiratory: Negative.     Gastrointestinal: Negative.    Endocrine: Negative.    Genitourinary: Negative.    All other systems reviewed and are negative.      Objective   /70   Pulse 64   Temp 36.6 °C (97.9 °F) (Temporal)   Wt 57.2 kg (126 lb)   BMI 21.63 kg/m²     Physical Exam  Vitals reviewed.   Constitutional:       Appearance: Normal appearance.   HENT:      Head: Normocephalic.   Eyes:      Conjunctiva/sclera: Conjunctivae normal.   Neck:      Vascular: No carotid bruit.   Cardiovascular:      Rate and Rhythm: Normal rate and regular rhythm.   Pulmonary:      Effort: Pulmonary effort is normal.      Breath sounds: Normal breath sounds.   Abdominal:      General: Abdomen is flat.      Palpations: Abdomen is soft. There is no mass.   Lymphadenopathy:      Cervical: No cervical adenopathy.   Neurological:      General: No focal deficit present.      Mental Status: She is alert and oriented to person, place, and time. Mental status is at baseline.   Psychiatric:         Mood and Affect: Mood normal.         Behavior: Behavior normal.         Thought Content: Thought content normal.         Assessment/Plan   Problem List Items Addressed This Visit             ICD-10-CM    Essential (hemorrhagic) thrombocythemia (Multi) - Primary D47.3     Patient has essential thrombocythemia platelet count is being monitored closely.  We reviewed CAT scan results and incidental finding she is asymptomatic and has no cough no shortness of breath there  is no indication for any antibiotic or any repeat imaging.  She will continue sertraline for anxiety.  Continue statins for hyperlipidemia.  Continue pantoprazole for acid reflux.  She is on Prolia for osteoporosis.

## 2024-04-23 ENCOUNTER — PROCEDURE VISIT (OUTPATIENT)
Dept: UROLOGY | Facility: CLINIC | Age: 69
End: 2024-04-23
Payer: COMMERCIAL

## 2024-04-23 VITALS
BODY MASS INDEX: 21.46 KG/M2 | RESPIRATION RATE: 16 BRPM | DIASTOLIC BLOOD PRESSURE: 81 MMHG | HEART RATE: 92 BPM | SYSTOLIC BLOOD PRESSURE: 125 MMHG | WEIGHT: 125 LBS

## 2024-04-23 DIAGNOSIS — N39.0 URINARY TRACT INFECTION WITHOUT HEMATURIA, SITE UNSPECIFIED: ICD-10-CM

## 2024-04-23 DIAGNOSIS — N35.12 POSTINFECTIVE URETHRAL STRICTURE IN FEMALE: Primary | ICD-10-CM

## 2024-04-23 PROCEDURE — 52281 CYSTOSCOPY AND TREATMENT: CPT | Performed by: UROLOGY

## 2024-04-23 PROCEDURE — 99214 OFFICE O/P EST MOD 30 MIN: CPT | Performed by: UROLOGY

## 2024-04-23 PROCEDURE — 51798 US URINE CAPACITY MEASURE: CPT | Performed by: UROLOGY

## 2024-04-23 NOTE — PROGRESS NOTES
"Patient ID: Keisha Giraldo is a 69 y.o. female.  Pt denies any pain today. States she had hiatal hernia repair with Dr. Gomez 3/27/2024. Denies any concerns about falling or safety. JML    Pt unable to give urine as she is not able to hold as much water right now due to recent surgery.       Procedures  Pt took macrodantin 50mg po as prescribed  Anesthesia: Local 2% Lidocaine  Instruments: 6F flexible disposable cystoscope     Pt brought to procedure room and placed in dorsal lithotomy position. Pt draped and prepped in normal sterile fashion. 5ml lidocaine instilled into urethral meatus and 5ml instilled into vagina. Pt tolerated well.     I was present as chaperone for the entirety of the procedure   Eliza Mai  Cystoscopy performed by Dr. Dale Wagoner  Bedside \"Time Out\" Verification   Today's Date:  04/23/2024. I attest that this time out verification took place prior to the procedure.   Procedure: cysto/dilation   RN/LPN/MA:KAREN   Provider: WAL.   Verified By: RN/LPN/MA,  KAREN  and Provider.   Prior to the start of the procedure a time out was taken and the following were verified: the identity of the patient using two patient identifiers, the correct procedure, the correct site marked as indicated, the correct positioning for the patient and the correct equipment was obtained.   Cystoscopy - female KEISHA GIRALDO      identified using two (2) forms of identification.   Procedure: diagnostic cystourethroscopy.  Procedure Note: Time Started: 3:00pm Time Completed: 3:03 PM  Indications for procedure: irritable voiding symptoms.   Discussed with patient: Risks, benefits, and alternative were discussed in detail. Patient appears to understand and agrees to proceed. Patient has signed the procedure consent form.    CYSTOSCOPY:    Cystoscopy today reveals a dense upward sloping urethral stricture dilated to 24 Belgian with minimal discomfort and no bleeding.  Once inside the bladder, both ureteral orifices " were identified.  No evidence of stones or tumors.  Erythema is confined to the bladder base and trigone.  No flow rate today.  PVR 16 cc.

## 2024-04-23 NOTE — PATIENT INSTRUCTIONS
Patient Discussion/Summary     It was very nice to see you again today. We had a long discussion regarding recurrent urinary tract infections and urethral strictures. You had a successful dilatation to 24 Japanese. This appears to be your limit, and we will therefore hold the size to 24 Japanese at your request and see you again in 8 weeks. You do have inflammation in the lower third of the bladder.  Urine culture did not show any infection and the CAT scan did not identify any new stones in your kidneys or ureters.      This note was created with voice-recognition software and was not corrected for typographical or grammatical errors

## 2024-04-23 NOTE — LETTER
April 23, 2024     Natalie Lombardo MD  [address] [address]  [facility] [address]    Patient: Kaelyn Mohamud   YOB: 1955   Date of Visit: 4/23/2024       Dear Dr. Natalie Lombardo MD:    Thank you for referring Kaelyn Mohamud to me for evaluation. Below are my notes for this consultation.  If you have questions, please do not hesitate to call me. I look forward to following your patient along with you.       Sincerely,     Dale Wagoner MD      CC: No Recipients  ______________________________________________________________________________________      Provider Impressions     69 year-old white female self-referred, previous patient of Dr. Oliver Smallwood, for RECURRENT UTI AND BLADDER SPASMS. Urinalysis is negative for blood. CAT scan of the chest, abdomen and pelvis is negative. Renal bladder ultrasound identifies a 3 mm right RENAL CALCULUS. Urine culture was negative but then a second urine culture with was positive for Klebsiella, UTI. Patient retired in 2018, previously employed in [employer]. No family history of breast or prostate cancer. The patient has never smoked cigarettes.      MACROBID  allergy     April 17, 2023, patient arrives alone. She states she has had Recurrent UTIs with Urgency and Frequency. She states that she has had URETHRAL STRICTURES. We discussed the etiology of recurrent UTIs and poor bladder emptying. She still has her uterus and ovaries. She is sexually active. She takes showers, not tub baths. She does not use a swimming pool, hot tub or bicycle. She has been diagnosed with a URETHRAL STRICTURE in the past. We will initiate cystoscopy with urethral dilatation, flow studies and a discussion of treatment options. She may require an alpha agent. Urine culture was negative as well as urine cytology     May 19, 2023, CYSTOSCOPY WITH URETHRAL DILATATION a 20 Lao, flow studies and a discussion of treatment options. DENSE URETHRAL STRICTURE. Severe  erythema in the lower half of the bladder. We will increase to 22 Israeli and 2 months. Flow rate 9 cc/s PVR 0 cc. Urinalysis and urine culture were negative. We will not add an alpha agent for antibiotic at this time.     August 9, 2023, cystoscopy with urethral dilatation a 20 Israeli. Attempts to 22 were unsuccessful due to pain. However the patient had a recent UTI and we will attempt that again at her next visit. Flow rate 2 cc/s, total volume 17 cc, PVR 0 cc. She states that after her first dilatation she noticed a dramatic improvement in flow and emptying. We will try to continue to increase the size and strongly consider a low-dose daily cephalosporin if necessary.     August 26, 2023, cystoscopy with urethral dilatation to 22 Israeli of a dense urethral stricture upward sloping. Moderate discomfort with no bleeding. Significant erythema in the bladder base and trigone with a very clear line of demarcation. No tumors or stones. Flow rate 2 cc/s, total volume 19 cc, PVR 0 cc. Urinalysis is negative for blood and urine culture no growth. She will return in 4 weeks and we will increase the size to 24 Israeli at patient's request.     September 16, 2023, telephone call, urine culture was positive for E. coli treated with Bactrim     September 23, 2023, cystoscopy with dilatation of a dense urethral stricture to 24 Israeli with moderate bleeding and moderate pain. Erythema confined to the bladder base and trigone. We will not increase the size as this appears to be her limit. . I have added Bactrim single strength daily at this time.. She will return in 4 weeks and then we will increase times weekly     December 16, 2023, cystoscopy with dilatation of a dense urethral stricture to 24 Israeli with minimal bleeding and moderate pain. Erythema confined to the trigone. We will not increase the size as this appears to be her limit. . I have added Bactrim single strength daily at this time. She will return in 5 weeks and  then we will increase times weekly     January 20, 2024, telephone call, urine culture is positive for E. coli.  Keflex ordered.  Resistant to Bactrim.     January 23, 2024, cystoscopy with dilatation of a dense urethral stricture to 24 Armenian with minimal pain and minimal bleeding.  Once again erythema confined to the trigone.  24 Armenian is the patient's limit.  We will change her daily Bactrim to Keflex as it was resistant to her most recent E. coli infection.  We will expand to 6-week regimen.     March 5, 2024, cystoscopy with dilatation of a dense upward sloping urethral stricture to 24 Armenian with moderate discomfort.  Erythema confined to the bladder base and trigone.  The patient cannot tolerate dilation larger than 24 Armenian.  She maintains her daily Keflex.  We will expand to 7 weeks.    April 23, 2024, cystoscopy with dilatation of a dense upward sloping urethral stricture to 24 Armenian with minimal discomfort.  Erythema again in the bladder base and trigone.  This patient cannot tolerate dilatation larger than the 24 Armenian size.  We will transition her from daily Keflex to twice a week Keflex at the 500 mg dose.  We will expand her regimen to 8 weeks.  She wishes to continue as she has less frequency, less urgency, and less nocturia.  Renal colic CAT scan does not identify any stones in the kidneys or ureters.  Urine culture no growth.  Urinalysis was negative for blood.     PLAN:     1. The patient will return in 8 weeks  INCREASE TIME PERIODS WEEKLY. for cystoscopy with urethral dilatation to 24 Armenian, flow studies and a URINALYSIS AND URINE CULTURE. She will receive PROPHYLAXIS WITH KEFLEX 250 mg p.o. every 12 hours for 4 doses. We will hold the size at 24 Armenian       #2  Keflex 500 mg p.o. q. Wednesday and q. Sunday    3.  In April 2025, cystoscopy with dilatation of 24 Armenian, flow rate, PVR, urinalysis, urine culture, renal colic CAT scan     Physical Exam  Vitals and nursing note reviewed. Exam  conducted with a chaperone present.   Constitutional:       Appearance: Normal appearance.   HENT:      Head: Normocephalic and atraumatic.   Pulmonary:      Effort: Pulmonary effort is normal.   Abdominal:      Palpations: Abdomen is soft.      Tenderness: There is no abdominal tenderness.   Genitourinary:     General: Normal vulva.      Vagina: No vaginal discharge.   Musculoskeletal:         General: Normal range of motion.      Cervical back: Normal range of motion and neck supple.   Neurological:      General: No focal deficit present.      Mental Status: She is alert and oriented to person, place, and time.   Psychiatric:         Mood and Affect: Mood normal.         Behavior: Behavior normal.        This note was created with voice-recognition software and was not corrected for typographical or grammatical errors

## 2024-04-23 NOTE — PROGRESS NOTES
Provider Impressions     69 year-old white female self-referred, previous patient of Dr. Oliver Smallwood, for RECURRENT UTI AND BLADDER SPASMS. Urinalysis is negative for blood. CAT scan of the chest, abdomen and pelvis is negative. Renal bladder ultrasound identifies a 3 mm right RENAL CALCULUS. Urine culture was negative but then a second urine culture with was positive for Klebsiella, UTI. Patient retired in 2018, previously employed in [employer]. No family history of breast or prostate cancer. The patient has never smoked cigarettes.      MACROBID  allergy     April 17, 2023, patient arrives alone. She states she has had Recurrent UTIs with Urgency and Frequency. She states that she has had URETHRAL STRICTURES. We discussed the etiology of recurrent UTIs and poor bladder emptying. She still has her uterus and ovaries. She is sexually active. She takes showers, not tub baths. She does not use a swimming pool, hot tub or bicycle. She has been diagnosed with a URETHRAL STRICTURE in the past. We will initiate cystoscopy with urethral dilatation, flow studies and a discussion of treatment options. She may require an alpha agent. Urine culture was negative as well as urine cytology     May 19, 2023, CYSTOSCOPY WITH URETHRAL DILATATION a 20 Vietnamese, flow studies and a discussion of treatment options. DENSE URETHRAL STRICTURE. Severe erythema in the lower half of the bladder. We will increase to 22 Vietnamese and 2 months. Flow rate 9 cc/s PVR 0 cc. Urinalysis and urine culture were negative. We will not add an alpha agent for antibiotic at this time.     August 9, 2023, cystoscopy with urethral dilatation a 20 Vietnamese. Attempts to 22 were unsuccessful due to pain. However the patient had a recent UTI and we will attempt that again at her next visit. Flow rate 2 cc/s, total volume 17 cc, PVR 0 cc. She states that after her first dilatation she noticed a dramatic improvement in flow and emptying. We will try to continue  to increase the size and strongly consider a low-dose daily cephalosporin if necessary.     August 26, 2023, cystoscopy with urethral dilatation to 22 Salvadorean of a dense urethral stricture upward sloping. Moderate discomfort with no bleeding. Significant erythema in the bladder base and trigone with a very clear line of demarcation. No tumors or stones. Flow rate 2 cc/s, total volume 19 cc, PVR 0 cc. Urinalysis is negative for blood and urine culture no growth. She will return in 4 weeks and we will increase the size to 24 Salvadorean at patient's request.     September 16, 2023, telephone call, urine culture was positive for E. coli treated with Bactrim     September 23, 2023, cystoscopy with dilatation of a dense urethral stricture to 24 Salvadorean with moderate bleeding and moderate pain. Erythema confined to the bladder base and trigone. We will not increase the size as this appears to be her limit. . I have added Bactrim single strength daily at this time.. She will return in 4 weeks and then we will increase times weekly     December 16, 2023, cystoscopy with dilatation of a dense urethral stricture to 24 Salvadorean with minimal bleeding and moderate pain. Erythema confined to the trigone. We will not increase the size as this appears to be her limit. . I have added Bactrim single strength daily at this time. She will return in 5 weeks and then we will increase times weekly     January 20, 2024, telephone call, urine culture is positive for E. coli.  Keflex ordered.  Resistant to Bactrim.     January 23, 2024, cystoscopy with dilatation of a dense urethral stricture to 24 Salvadorean with minimal pain and minimal bleeding.  Once again erythema confined to the trigone.  24 Salvadorean is the patient's limit.  We will change her daily Bactrim to Keflex as it was resistant to her most recent E. coli infection.  We will expand to 6-week regimen.     March 5, 2024, cystoscopy with dilatation of a dense upward sloping urethral stricture to  24 Fijian with moderate discomfort.  Erythema confined to the bladder base and trigone.  The patient cannot tolerate dilation larger than 24 Fijian.  She maintains her daily Keflex.  We will expand to 7 weeks.    April 23, 2024, cystoscopy with dilatation of a dense upward sloping urethral stricture to 24 Fijian with minimal discomfort.  Erythema again in the bladder base and trigone.  This patient cannot tolerate dilatation larger than the 24 Fijian size.  We will transition her from daily Keflex to twice a week Keflex at the 500 mg dose.  We will expand her regimen to 8 weeks.  She wishes to continue as she has less frequency, less urgency, and less nocturia.  Renal colic CAT scan does not identify any stones in the kidneys or ureters.  Urine culture no growth.  Urinalysis was negative for blood.     PLAN:     1. The patient will return in 8 weeks  INCREASE TIME PERIODS WEEKLY. for cystoscopy with urethral dilatation to 24 Fijian, flow studies and a URINALYSIS AND URINE CULTURE. She will receive PROPHYLAXIS WITH KEFLEX 250 mg p.o. every 12 hours for 4 doses. We will hold the size at 24 Fijian       #2  Keflex 500 mg p.o. q. Wednesday and q. Sunday    3.  In April 2025, cystoscopy with dilatation of 24 Fijian, flow rate, PVR, urinalysis, urine culture, renal colic CAT scan     Physical Exam  Vitals and nursing note reviewed. Exam conducted with a chaperone present.   Constitutional:       Appearance: Normal appearance.   HENT:      Head: Normocephalic and atraumatic.   Pulmonary:      Effort: Pulmonary effort is normal.   Abdominal:      Palpations: Abdomen is soft.      Tenderness: There is no abdominal tenderness.   Genitourinary:     General: Normal vulva.      Vagina: No vaginal discharge.   Musculoskeletal:         General: Normal range of motion.      Cervical back: Normal range of motion and neck supple.   Neurological:      General: No focal deficit present.      Mental Status: She is alert and oriented to  person, place, and time.   Psychiatric:         Mood and Affect: Mood normal.         Behavior: Behavior normal.        This note was created with voice-recognition software and was not corrected for typographical or grammatical errors

## 2024-04-24 RX ORDER — CEPHALEXIN 500 MG/1
500 CAPSULE ORAL SEE ADMIN INSTRUCTIONS
Qty: 30 CAPSULE | Refills: 3 | Status: SHIPPED | OUTPATIENT
Start: 2024-04-24 | End: 2024-04-26

## 2024-05-06 ENCOUNTER — OFFICE VISIT (OUTPATIENT)
Dept: SURGERY | Facility: CLINIC | Age: 69
End: 2024-05-06
Payer: COMMERCIAL

## 2024-05-06 VITALS
HEIGHT: 64 IN | SYSTOLIC BLOOD PRESSURE: 131 MMHG | DIASTOLIC BLOOD PRESSURE: 86 MMHG | WEIGHT: 124 LBS | HEART RATE: 90 BPM | BODY MASS INDEX: 21.17 KG/M2

## 2024-05-06 DIAGNOSIS — Z87.19 H/O HIATAL HERNIA: Primary | ICD-10-CM

## 2024-05-06 PROCEDURE — 1036F TOBACCO NON-USER: CPT | Performed by: SURGERY

## 2024-05-06 PROCEDURE — 1159F MED LIST DOCD IN RCRD: CPT | Performed by: SURGERY

## 2024-05-06 PROCEDURE — 99024 POSTOP FOLLOW-UP VISIT: CPT | Performed by: SURGERY

## 2024-05-06 PROCEDURE — 1160F RVW MEDS BY RX/DR IN RCRD: CPT | Performed by: SURGERY

## 2024-05-06 NOTE — PROGRESS NOTES
Subjective   Patient ID: Kaelyn Mohamud is a 69 y.o. female who presents for Follow-up.  HPI  69-year-old patient who underwent laparoscopic hiatal hernia repair with toupet wrap March 27.  Here in follow-up.  Denies nausea, vomiting, heartburn, acid reflux, dysphagia and cough.  Still has pain over the left upper quadrant incision and she is taking NSAIDs.    Objective   Physical Exam  Abd: soft, non-distended, minimal tenderness over LUQ incision, no hernia  Assessment/Plan   The patient continues to do well.  She was reassured that the LUQ pain will improve.  She will take the Protonix for another 2 weeks to help heal the grade B distal esophagitis prior to stopping it; followup as needed         Sanjay Gomez MD 05/06/24 10:22 AM

## 2024-05-10 ENCOUNTER — TELEPHONE (OUTPATIENT)
Dept: SURGERY | Facility: CLINIC | Age: 69
End: 2024-05-10
Payer: COMMERCIAL

## 2024-05-10 NOTE — TELEPHONE ENCOUNTER
I called and spoke with patient. While  backing up 3 days ago, she turned swiftly on the left side to look for incoming cars and immediately felt pain on the left side at her LUQ incision.  She is concerned she may have abdominal wall hernia over the left upper quadrant incision; she denies seeing a bulge. I told her she most likely pulled or strained a muscle but will continue to monitor for bulge or worsening pain.  She also reports being very stressed over the past few days because her daughter and her  are going through some marital issues.

## 2024-05-31 ENCOUNTER — HOSPITAL ENCOUNTER (EMERGENCY)
Facility: HOSPITAL | Age: 69
Discharge: HOME | End: 2024-05-31
Attending: EMERGENCY MEDICINE
Payer: COMMERCIAL

## 2024-05-31 ENCOUNTER — APPOINTMENT (OUTPATIENT)
Dept: CARDIOLOGY | Facility: HOSPITAL | Age: 69
End: 2024-05-31
Payer: COMMERCIAL

## 2024-05-31 ENCOUNTER — APPOINTMENT (OUTPATIENT)
Dept: RADIOLOGY | Facility: HOSPITAL | Age: 69
End: 2024-05-31
Payer: COMMERCIAL

## 2024-05-31 VITALS
WEIGHT: 130 LBS | BODY MASS INDEX: 22.2 KG/M2 | HEIGHT: 64 IN | DIASTOLIC BLOOD PRESSURE: 87 MMHG | RESPIRATION RATE: 19 BRPM | SYSTOLIC BLOOD PRESSURE: 150 MMHG | OXYGEN SATURATION: 97 % | HEART RATE: 80 BPM | TEMPERATURE: 97.5 F

## 2024-05-31 DIAGNOSIS — R55 NEAR SYNCOPE: Primary | ICD-10-CM

## 2024-05-31 LAB
ALBUMIN SERPL BCP-MCNC: 5 G/DL (ref 3.4–5)
ALP SERPL-CCNC: 73 U/L (ref 33–136)
ALT SERPL W P-5'-P-CCNC: 12 U/L (ref 7–45)
ANION GAP SERPL CALC-SCNC: 17 MMOL/L (ref 10–20)
AST SERPL W P-5'-P-CCNC: 19 U/L (ref 9–39)
BASOPHILS # BLD AUTO: 0.05 X10*3/UL (ref 0–0.1)
BASOPHILS NFR BLD AUTO: 0.8 %
BILIRUB SERPL-MCNC: 0.4 MG/DL (ref 0–1.2)
BNP SERPL-MCNC: 10 PG/ML (ref 0–99)
BUN SERPL-MCNC: 13 MG/DL (ref 6–23)
CALCIUM SERPL-MCNC: 10.1 MG/DL (ref 8.6–10.3)
CARDIAC TROPONIN I PNL SERPL HS: <3 NG/L (ref 0–13)
CARDIAC TROPONIN I PNL SERPL HS: <3 NG/L (ref 0–13)
CHLORIDE SERPL-SCNC: 99 MMOL/L (ref 98–107)
CO2 SERPL-SCNC: 25 MMOL/L (ref 21–32)
CREAT SERPL-MCNC: 0.68 MG/DL (ref 0.5–1.05)
D DIMER PPP FEU-MCNC: 400 NG/ML FEU
EGFRCR SERPLBLD CKD-EPI 2021: >90 ML/MIN/1.73M*2
EOSINOPHIL # BLD AUTO: 0.14 X10*3/UL (ref 0–0.7)
EOSINOPHIL NFR BLD AUTO: 2.2 %
ERYTHROCYTE [DISTWIDTH] IN BLOOD BY AUTOMATED COUNT: 14.5 % (ref 11.5–14.5)
GLUCOSE SERPL-MCNC: 84 MG/DL (ref 74–99)
HCT VFR BLD AUTO: 42.1 % (ref 36–46)
HGB BLD-MCNC: 13.8 G/DL (ref 12–16)
IMM GRANULOCYTES # BLD AUTO: 0.03 X10*3/UL (ref 0–0.7)
IMM GRANULOCYTES NFR BLD AUTO: 0.5 % (ref 0–0.9)
LIPASE SERPL-CCNC: 51 U/L (ref 9–82)
LYMPHOCYTES # BLD AUTO: 1.23 X10*3/UL (ref 1.2–4.8)
LYMPHOCYTES NFR BLD AUTO: 18.9 %
MAGNESIUM SERPL-MCNC: 1.99 MG/DL (ref 1.6–2.4)
MCH RBC QN AUTO: 29.2 PG (ref 26–34)
MCHC RBC AUTO-ENTMCNC: 32.8 G/DL (ref 32–36)
MCV RBC AUTO: 89 FL (ref 80–100)
MONOCYTES # BLD AUTO: 0.49 X10*3/UL (ref 0.1–1)
MONOCYTES NFR BLD AUTO: 7.5 %
NEUTROPHILS # BLD AUTO: 4.56 X10*3/UL (ref 1.2–7.7)
NEUTROPHILS NFR BLD AUTO: 70.1 %
NRBC BLD-RTO: 0 /100 WBCS (ref 0–0)
PLATELET # BLD AUTO: 490 X10*3/UL (ref 150–450)
POTASSIUM SERPL-SCNC: 4 MMOL/L (ref 3.5–5.3)
PROT SERPL-MCNC: 8.4 G/DL (ref 6.4–8.2)
RBC # BLD AUTO: 4.72 X10*6/UL (ref 4–5.2)
SODIUM SERPL-SCNC: 137 MMOL/L (ref 136–145)
WBC # BLD AUTO: 6.5 X10*3/UL (ref 4.4–11.3)

## 2024-05-31 PROCEDURE — 71045 X-RAY EXAM CHEST 1 VIEW: CPT

## 2024-05-31 PROCEDURE — 84484 ASSAY OF TROPONIN QUANT: CPT | Performed by: EMERGENCY MEDICINE

## 2024-05-31 PROCEDURE — 83735 ASSAY OF MAGNESIUM: CPT | Performed by: EMERGENCY MEDICINE

## 2024-05-31 PROCEDURE — 36415 COLL VENOUS BLD VENIPUNCTURE: CPT | Performed by: EMERGENCY MEDICINE

## 2024-05-31 PROCEDURE — 71045 X-RAY EXAM CHEST 1 VIEW: CPT | Performed by: RADIOLOGY

## 2024-05-31 PROCEDURE — 83690 ASSAY OF LIPASE: CPT | Performed by: EMERGENCY MEDICINE

## 2024-05-31 PROCEDURE — 70450 CT HEAD/BRAIN W/O DYE: CPT

## 2024-05-31 PROCEDURE — 99284 EMERGENCY DEPT VISIT MOD MDM: CPT

## 2024-05-31 PROCEDURE — 85025 COMPLETE CBC W/AUTO DIFF WBC: CPT | Performed by: EMERGENCY MEDICINE

## 2024-05-31 PROCEDURE — 82040 ASSAY OF SERUM ALBUMIN: CPT | Performed by: EMERGENCY MEDICINE

## 2024-05-31 PROCEDURE — 85379 FIBRIN DEGRADATION QUANT: CPT | Performed by: EMERGENCY MEDICINE

## 2024-05-31 PROCEDURE — 93005 ELECTROCARDIOGRAM TRACING: CPT

## 2024-05-31 PROCEDURE — 2500000004 HC RX 250 GENERAL PHARMACY W/ HCPCS (ALT 636 FOR OP/ED): Performed by: EMERGENCY MEDICINE

## 2024-05-31 PROCEDURE — 83880 ASSAY OF NATRIURETIC PEPTIDE: CPT | Performed by: EMERGENCY MEDICINE

## 2024-05-31 PROCEDURE — 70450 CT HEAD/BRAIN W/O DYE: CPT | Performed by: RADIOLOGY

## 2024-05-31 RX ADMIN — SODIUM CHLORIDE 1000 ML: 9 INJECTION, SOLUTION INTRAVENOUS at 12:22

## 2024-05-31 ASSESSMENT — HEART SCORE
HISTORY: SLIGHTLY SUSPICIOUS
HEART SCORE: 3
RISK FACTORS: 1-2 RISK FACTORS
TROPONIN: LESS THAN OR EQUAL TO NORMAL LIMIT
AGE: 65+
ECG: NORMAL

## 2024-05-31 ASSESSMENT — COLUMBIA-SUICIDE SEVERITY RATING SCALE - C-SSRS
1. IN THE PAST MONTH, HAVE YOU WISHED YOU WERE DEAD OR WISHED YOU COULD GO TO SLEEP AND NOT WAKE UP?: NO
6. HAVE YOU EVER DONE ANYTHING, STARTED TO DO ANYTHING, OR PREPARED TO DO ANYTHING TO END YOUR LIFE?: NO
2. HAVE YOU ACTUALLY HAD ANY THOUGHTS OF KILLING YOURSELF?: NO

## 2024-05-31 ASSESSMENT — LIFESTYLE VARIABLES
EVER HAD A DRINK FIRST THING IN THE MORNING TO STEADY YOUR NERVES TO GET RID OF A HANGOVER: NO
HAVE YOU EVER FELT YOU SHOULD CUT DOWN ON YOUR DRINKING: NO
EVER FELT BAD OR GUILTY ABOUT YOUR DRINKING: NO
HAVE PEOPLE ANNOYED YOU BY CRITICIZING YOUR DRINKING: NO
TOTAL SCORE: 0

## 2024-05-31 ASSESSMENT — PAIN SCALES - GENERAL: PAINLEVEL_OUTOF10: 0 - NO PAIN

## 2024-05-31 ASSESSMENT — PAIN - FUNCTIONAL ASSESSMENT: PAIN_FUNCTIONAL_ASSESSMENT: 0-10

## 2024-05-31 NOTE — DISCHARGE INSTRUCTIONS
Follow up with your primary care doctor and cardiology.  Return to the emergency department if symptoms worsen or change.

## 2024-05-31 NOTE — ED PROVIDER NOTES
69-year-old female presents emergency department with complaints of feeling like she may pass out.  Patient states she had the symptoms 2 days ago this past Wednesday when she was at work.  Patient states that this was her first full day at work since having hiatal hernia surgery in March.  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  Patient reports she had to sit down and had a friend drive her home.  She reports she did not pass out or hit her head.  She states since that time she has been feeling generally unwell.  She also states she had some left upper arm pain yesterday evening, but none currently.  She denies fevers, coughing, or congestion.  Denies any current chest pain or difficulty breathing.  No abdominal pain or vomiting, but reports some nausea.  No dysuria, diarrhea, constipation, black or bloody stools.  Patient does report a diffuse headache.  She denies room spinning dizziness and states she has had vertigo in the past and this does not feel similar. Patient is not on any anticoagulants.  She has significant past medical history for diverticulitis, anemia, anxiety, cholecystitis, GERD, hyperlipidemia, and hiatal hernia.  No tobacco use, illicit drug use, or regular alcohol use.  Patient had surgery for hiatal hernia performed March 27, 2024.      History provided by:  Patient   used: No           ------------------------------------------------------------------------------------------------------------------------------------------    VS: As documented in the triage note and EMR flowsheet from this visit were reviewed.    Review of Systems  Constitutional: no fever, chills reports  Eyes: no redness, discharge, pain  HENT: no sore throat, nose bleeds, congestion, rhinorrhea   Cardiovascular: no chest pain, leg edema, palpitations  Respiratory: no shortness of breath, cough, wheezing  GI: Admits to nausea no  diarrhea, pain, vomiting, constipation, BRBPR, melena  : no dysuria, frequency, hematuria  Musculoskeletal: no neck pain, stiffness,  no joint deformity, swelling  Skin: no rash, erythema, wounds  Neurological: Reports headache and lightheadedness dizziness, weakness, numbness, or tingling  Psychiatric: no suicidal thoughts, confusion, agitation  Metabolic: no polyuria or polydipsia  Hematologic: no increased bleeding or bruising  Immunology: No immunocompromise state    [facility]  Nursing notes reviewed and confirmed by me.  Chart review performed including medications, allergies, and medical, surgical, and family history  Visit Vitals  /87   Pulse 80   Temp 36.4 °C (97.5 °F) (Temporal)   Resp 19     Physical Exam  Vitals and nursing note reviewed.   Constitutional:       General: She is not in acute distress.     Appearance: Normal appearance. She is not ill-appearing.   HENT:      Head: Normocephalic and atraumatic.      Right Ear: External ear normal.      Left Ear: External ear normal.      Nose: Nose normal. No congestion or rhinorrhea.      Mouth/Throat:      Mouth: Mucous membranes are moist.      Pharynx: No oropharyngeal exudate or posterior oropharyngeal erythema.   Eyes:      Extraocular Movements: Extraocular movements intact.      Conjunctiva/sclera: Conjunctivae normal.      Pupils: Pupils are equal, round, and reactive to light.   Cardiovascular:      Rate and Rhythm: Normal rate and regular rhythm.      Pulses: Normal pulses.      Heart sounds: Normal heart sounds.   Pulmonary:      Effort: Pulmonary effort is normal. No respiratory distress.      Breath sounds: Normal breath sounds. No stridor. No wheezing, rhonchi or rales.   Abdominal:      General: There is no distension.      Palpations: Abdomen is soft.      Tenderness: There is no abdominal tenderness. There is no guarding or rebound.      Comments: Postsurgical scars are healed.  Abdomen is nontender.   Musculoskeletal:         General:  "No swelling or deformity. Normal range of motion.      Cervical back: Normal range of motion and neck supple. No rigidity.      Right lower leg: No edema.      Left lower leg: No edema.      Comments: No calf tenderness    Skin:     General: Skin is warm and dry.      Capillary Refill: Capillary refill takes less than 2 seconds.      Coloration: Skin is not jaundiced.      Findings: No rash.   Neurological:      General: No focal deficit present.      Mental Status: She is alert and oriented to person, place, and time.      Sensory: No sensory deficit.      Motor: No weakness.      Comments: Cranial nerves II through XII grossly intact.  NIH stroke scale is 0.   Psychiatric:         Mood and Affect: Mood normal.         Behavior: Behavior normal.        Past Medical History:   Diagnosis Date    Allergies     Anxiety     Arthritis     Diverticulosis     GERD (gastroesophageal reflux disease)     Hiatal hernia     Hyperlipidemia     Plantar fascial fibromatosis 10/06/2020    Plantar fasciitis of left foot    Urinary tract infection     Vertigo     Wears dentures     upper    Wears glasses       Past Surgical History:   Procedure Laterality Date    ABCESS DRAINAGE      perianal    ABSCESS DRAINAGE      \"skin abscess\"    CATARACT EXTRACTION      CHOLECYSTECTOMY      COLONOSCOPY      FOOT SURGERY      plantar fascitis    LAPAROSCOPY REPAIR HIATAL HERNIA      UPPER GASTROINTESTINAL ENDOSCOPY      US ASPIRATION INJECTION INTERMEDIATE JOINT  01/25/2022    US ASPIRATION INJECTION INTERMEDIATE JOINT 1/25/2022 [facility]      Social History     Socioeconomic History    Marital status: Single     Spouse name: Not on file    Number of children: Not on file    Years of education: Not on file    Highest education level: Not on file   Occupational History    Not on file   Tobacco Use    Smoking status: Never    Smokeless tobacco: Never   Vaping Use    Vaping status: Never Used   Substance and Sexual Activity    Alcohol " use: Yes     Alcohol/week: 4.0 standard drinks of alcohol     Types: 2 Cans of beer, 2 Shots of liquor per week    Drug use: Never    Sexual activity: Not on file   Other Topics Concern    Not on file   Social History Narrative    Not on file     Social Determinants of Health     Financial Resource Strain: Low Risk  (3/27/2024)    Overall Financial Resource Strain (CARDIA)     Difficulty of Paying Living Expenses: Not hard at all   Food Insecurity: Not on file   Transportation Needs: No Transportation Needs (3/27/2024)    PRAPARE - Transportation     Lack of Transportation (Medical): No     Lack of Transportation (Non-Medical): No   Physical Activity: Not on file   Stress: Not on file   Social Connections: Not on file   Intimate Partner Violence: Not on file   Housing Stability: Low Risk  (3/27/2024)    Housing Stability Vital Sign     Unable to Pay for Housing in the Last Year: No     Number of Places Lived in the Last Year: 1     Unstable Housing in the Last Year: No      ------------------------------------------------------------------------------------------------------------------------------------------  CT head wo IV contrast   Final Result   1. No acute intracranial abnormality.             MACRO:   None        Signed by: Asher Clemente 5/31/2024 2:02 PM   Dictation workstation:   [id number]      XR chest 1 view   Final Result   1.  No evidence of acute cardiopulmonary process.                  MACRO:   None        Signed by: Joseph Schoenberger 5/31/2024 12:12 PM   Dictation workstation:   [id number]         Labs Reviewed   CBC WITH AUTO DIFFERENTIAL - Abnormal       Result Value    WBC 6.5      nRBC 0.0      RBC 4.72      Hemoglobin 13.8      Hematocrit 42.1      MCV 89      MCH 29.2      MCHC 32.8      RDW 14.5      Platelets 490 (*)     Neutrophils % 70.1      Immature Granulocytes %, Automated 0.5      Lymphocytes % 18.9      Monocytes % 7.5      Eosinophils % 2.2      Basophils % 0.8       Neutrophils Absolute 4.56      Immature Granulocytes Absolute, Automated 0.03      Lymphocytes Absolute 1.23      Monocytes Absolute 0.49      Eosinophils Absolute 0.14      Basophils Absolute 0.05     COMPREHENSIVE METABOLIC PANEL - Abnormal    Glucose 84      Sodium 137      Potassium 4.0      Chloride 99      Bicarbonate 25      Anion Gap 17      Urea Nitrogen 13      Creatinine 0.68      eGFR >90      Calcium 10.1      Albumin 5.0      Alkaline Phosphatase 73      Total Protein 8.4 (*)     AST 19      Bilirubin, Total 0.4      ALT 12     MAGNESIUM - Normal    Magnesium 1.99     B-TYPE NATRIURETIC PEPTIDE - Normal    BNP 10      Narrative:        <100 pg/mL - Heart failure unlikely  100-299 pg/mL - Intermediate probability of acute heart                  failure exacerbation. Correlate with clinical                  context and patient history.    >=300 pg/mL - Heart Failure likely. Correlate with clinical                  context and patient history.    BNP testing is performed using different testing methodology at [facility] than at other [facility]. Direct result comparisons should only be made within the same method.      LIPASE - Normal    Lipase 51      Narrative:     Venipuncture immediately after or during the administration of Metamizole may lead to falsely low results. Testing should be performed immediately prior to Metamizole dosing.   D-DIMER, VTE EXCLUSION - Normal    D-Dimer, Quantitative VTE Exclusion 400      Narrative:     The VTE Exclusion D-Dimer assay is reported in ng/mL Fibrinogen Equivalent Units (FEU).    Per 's instructions for use, a value of less than 500 ng/mL (FEU) may help to exclude DVT or PE in outpatients when the assay is used with a clinical pretest probability assessment.([facility] must utilize and document eCalc 'Wells Score Deep Vein Thrombosis Risk' for DVT exclusion only. Emergency Department should utilize  Guidelines for Emergency Department  Use of the VTE Exclusion D-Dimer and Clinical Pretest probability assessment model for DVT or PE exclusion.)   SERIAL TROPONIN-INITIAL - Normal    Troponin I, High Sensitivity <3      Narrative:     Less than 99th percentile of normal range cutoff-  Female and children under 18 years old <14 ng/L; Male <21 ng/L: Negative  Repeat testing should be performed if clinically indicated.     Female and children under 18 years old 14-50 ng/L; Male 21-50 ng/L:  Consistent with possible cardiac damage and possible increased clinical   risk. Serial measurements may help to assess extent of myocardial damage.     >50 ng/L: Consistent with cardiac damage, increased clinical risk and  myocardial infarction. Serial measurements may help assess extent of   myocardial damage.      NOTE: Children less than 1 year old may have higher baseline troponin   levels and results should be interpreted in conjunction with the overall   clinical context.     NOTE: Troponin I testing is performed using a different   testing methodology at [facility] than at other   [facility]. Direct result comparisons should only   be made within the same method.   SERIAL TROPONIN, 1 HOUR - Normal    Troponin I, High Sensitivity <3      Narrative:     Less than 99th percentile of normal range cutoff-  Female and children under 18 years old <14 ng/L; Male <21 ng/L: Negative  Repeat testing should be performed if clinically indicated.     Female and children under 18 years old 14-50 ng/L; Male 21-50 ng/L:  Consistent with possible cardiac damage and possible increased clinical   risk. Serial measurements may help to assess extent of myocardial damage.     >50 ng/L: Consistent with cardiac damage, increased clinical risk and  myocardial infarction. Serial measurements may help assess extent of   myocardial damage.      NOTE: Children less than 1 year old may have higher baseline troponin   levels and results should be interpreted in conjunction  with the overall   clinical context.     NOTE: Troponin I testing is performed using a different   testing methodology at [facility] than at other   [facility] hospitals. Direct result comparisons should only   be made within the same method.   TROPONIN SERIES- (INITIAL, 1 HR)    Narrative:     The following orders were created for panel order Troponin I Series, High Sensitivity (0, 1 HR).  Procedure                               Abnormality         Status                     ---------                               -----------         ------                     Troponin I, High Sensiti...[[id number]]  Normal              Final result               Troponin, High Sensitivi...[[id number]]  Normal              Final result                 Please view results for these tests on the individual orders.        Medical Decision Making  EKG interpreted by ED physician: Normal sinus rhythm rate of 76.  HI, QRS, QTc intervals are all within normal limits.  No significant ST elevations or depressions.  No significant Q waves.  Good R wave progression.  Normal axis.    Repeat EKG interpreted by ED physician: Normal sinus rhythm rate of 74.  HI, QRS, and QTc intervals within normal limits.  No significant ST elevations or depressions.  No significant Q waves.  Good R wave progression.  Normal axis.      69-year-old female presents emergency department with chief complaint of feeling like she was going to pass out extremities ago.  On my exam she is afebrile nontoxic.  She denies any current symptoms on my evaluation.  A thorough workup was obtained given her presenting symptoms.  Cardiac enzymes x 2 and EKG do not show acute ACS or significant arrhythmia.  D-dimer is negative making blood clot unlikely.  Lipase within normal range I do not suspect pancreatitis.  CMP shows no significant metabolic abnormalities.  BNP within normal range I do not suspect CHF exacerbation.  CBC shows no significant leukocytosis or anemia.   Head CT does not show any acute intracranial process such as hemorrhage or mass effect.  Patient has no focal neurologic deficit on my exam.  NIH stroke scale is 0 and she denies room spinning sensation so I have low suspicion for posterior stroke.  Chest x-ray shows no acute cardiopulmonary process such as pneumonia, pleural effusion, or pulmonary edema.  Patient treated symptomatically with IV fluids.  Orthostatic vital signs are negative.  On reevaluation patient states she continues to feel well.  Patient tells me that she believes her symptoms may have been due to a panic attack.  I discussed with the patient that this may have caused her symptoms, but should be a diagnosis of exclusion.  Advised patient that she should follow-up with her primary care as well as cardiology for further evaluation.  We discussed other possibilities causing her symptoms such as dehydration, vasovagal syncope, or heart arrhythmia.  She is advised to follow-up with her primary care doctor.  We also discussed reasons to return to the ED.  Patient comfortable returning home.         Diagnoses as of 05/31/24 1538   Near syncope      1. Near syncope           Procedures     This note was dictated using dragon software and may contain errors related to dictation interpretation errors.      Lino Wells, DO  05/31/24 1538

## 2024-06-01 LAB
ATRIAL RATE: 74 BPM
ATRIAL RATE: 76 BPM
P AXIS: 67 DEGREES
P AXIS: 72 DEGREES
P OFFSET: 188 MS
P OFFSET: 194 MS
P ONSET: 141 MS
P ONSET: 143 MS
PR INTERVAL: 158 MS
PR INTERVAL: 166 MS
Q ONSET: 222 MS
Q ONSET: 224 MS
QRS COUNT: 12 BEATS
QRS COUNT: 13 BEATS
QRS DURATION: 92 MS
QRS DURATION: 96 MS
QT INTERVAL: 400 MS
QT INTERVAL: 410 MS
QTC CALCULATION(BAZETT): 450 MS
QTC CALCULATION(BAZETT): 455 MS
QTC FREDERICIA: 432 MS
QTC FREDERICIA: 440 MS
R AXIS: 48 DEGREES
R AXIS: 50 DEGREES
T AXIS: 51 DEGREES
T AXIS: 51 DEGREES
T OFFSET: 424 MS
T OFFSET: 427 MS
VENTRICULAR RATE: 74 BPM
VENTRICULAR RATE: 76 BPM

## 2024-06-03 ENCOUNTER — HOSPITAL ENCOUNTER (OUTPATIENT)
Dept: CARDIOLOGY | Facility: HOSPITAL | Age: 69
Discharge: HOME | End: 2024-06-03
Payer: COMMERCIAL

## 2024-06-03 ENCOUNTER — OFFICE VISIT (OUTPATIENT)
Dept: PRIMARY CARE | Facility: CLINIC | Age: 69
End: 2024-06-03
Payer: COMMERCIAL

## 2024-06-03 VITALS
WEIGHT: 123 LBS | SYSTOLIC BLOOD PRESSURE: 136 MMHG | DIASTOLIC BLOOD PRESSURE: 78 MMHG | BODY MASS INDEX: 21.11 KG/M2 | HEART RATE: 72 BPM | TEMPERATURE: 97.9 F

## 2024-06-03 DIAGNOSIS — R55 NEAR SYNCOPE: Primary | ICD-10-CM

## 2024-06-03 DIAGNOSIS — E78.5 HYPERLIPIDEMIA, UNSPECIFIED HYPERLIPIDEMIA TYPE: ICD-10-CM

## 2024-06-03 DIAGNOSIS — F41.9 ANXIETY: ICD-10-CM

## 2024-06-03 PROCEDURE — 93005 ELECTROCARDIOGRAM TRACING: CPT

## 2024-06-03 PROCEDURE — 1036F TOBACCO NON-USER: CPT | Performed by: INTERNAL MEDICINE

## 2024-06-03 PROCEDURE — 99213 OFFICE O/P EST LOW 20 MIN: CPT | Performed by: INTERNAL MEDICINE

## 2024-06-03 RX ORDER — ROSUVASTATIN CALCIUM 5 MG/1
5 TABLET, COATED ORAL DAILY
Qty: 90 TABLET | Refills: 1 | Status: SHIPPED | OUTPATIENT
Start: 2024-06-03

## 2024-06-03 RX ORDER — SERTRALINE HYDROCHLORIDE 50 MG/1
TABLET, FILM COATED ORAL
Qty: 90 TABLET | Refills: 1 | Status: SHIPPED | OUTPATIENT
Start: 2024-06-03

## 2024-06-03 ASSESSMENT — ENCOUNTER SYMPTOMS
EYES NEGATIVE: 1
PSYCHIATRIC NEGATIVE: 1
RESPIRATORY NEGATIVE: 1
HEMATOLOGIC/LYMPHATIC NEGATIVE: 1
CONSTITUTIONAL NEGATIVE: 1
NEUROLOGICAL NEGATIVE: 1
MUSCULOSKELETAL NEGATIVE: 1

## 2024-06-03 ASSESSMENT — PATIENT HEALTH QUESTIONNAIRE - PHQ9
2. FEELING DOWN, DEPRESSED OR HOPELESS: NOT AT ALL
SUM OF ALL RESPONSES TO PHQ9 QUESTIONS 1 & 2: 0
1. LITTLE INTEREST OR PLEASURE IN DOING THINGS: NOT AT ALL

## 2024-06-03 NOTE — PROGRESS NOTES
Subjective   Patient ID: Kaelyn Mohamud is a 69 y.o. female who presents for Follow-up (Pt here for Emergency dept follow syncpope).    HPI patient is here for ER follow-up.  Went to ER for near syncope.  She did not lose consciousness she had hiatal hernia surgery done 3 weeks ago.  Her initial EKG had shown incomplete right bundle branch block repeat EKG was normal she is doing fine she had probably overexerted at work    Review of Systems   Constitutional: Negative.    Eyes: Negative.    Respiratory: Negative.     Cardiovascular:         See HPI   Genitourinary: Negative.    Musculoskeletal: Negative.    Neurological: Negative.    Hematological: Negative.    Psychiatric/Behavioral: Negative.     All other systems reviewed and are negative.      Objective   /78   Pulse 72   Temp 36.6 °C (97.9 °F) (Temporal)   Wt 55.8 kg (123 lb)   BMI 21.11 kg/m²     Physical Exam  Vitals reviewed.   Constitutional:       Appearance: Normal appearance.   Neck:      Vascular: No carotid bruit.   Cardiovascular:      Rate and Rhythm: Normal rate and regular rhythm.   Pulmonary:      Effort: Pulmonary effort is normal.      Breath sounds: Normal breath sounds.   Musculoskeletal:      Right lower leg: No edema.      Left lower leg: No edema.   Neurological:      General: No focal deficit present.      Mental Status: She is alert and oriented to person, place, and time. Mental status is at baseline.      Cranial Nerves: No cranial nerve deficit.   Psychiatric:         Mood and Affect: Mood normal.         Behavior: Behavior normal.         Thought Content: Thought content normal.       Assessment/Plan   Problem List Items Addressed This Visit             ICD-10-CM    Anxiety F41.9    Relevant Medications    sertraline (Zoloft) 50 mg tablet    Hyperlipidemia E78.5    Relevant Medications    rosuvastatin (Crestor) 5 mg tablet    Near syncope - Primary R55     Her EKG and ER visit reviewed.  She is doing well having no other  issues at this point she is recovering well from hiatal hernia surgery.  She will continue Zoloft for anxiety depression.  She is on statins for hyperlipidemia.  Follow-up with us and few weeks.

## 2024-06-08 LAB
ATRIAL RATE: 59 BPM
P AXIS: 55 DEGREES
P OFFSET: 189 MS
P ONSET: 141 MS
PR INTERVAL: 158 MS
Q ONSET: 220 MS
QRS COUNT: 10 BEATS
QRS DURATION: 88 MS
QT INTERVAL: 384 MS
QTC CALCULATION(BAZETT): 380 MS
QTC FREDERICIA: 382 MS
R AXIS: 38 DEGREES
T AXIS: 21 DEGREES
T OFFSET: 412 MS
VENTRICULAR RATE: 59 BPM

## 2024-06-18 ENCOUNTER — APPOINTMENT (OUTPATIENT)
Dept: UROLOGY | Facility: CLINIC | Age: 69
End: 2024-06-18
Payer: COMMERCIAL

## 2024-06-18 DIAGNOSIS — Z84.89: ICD-10-CM

## 2024-06-18 DIAGNOSIS — M81.0 POST-MENOPAUSAL OSTEOPOROSIS: ICD-10-CM

## 2024-06-18 DIAGNOSIS — Z91.89 AT RISK FOR DECREASED BONE DENSITY: ICD-10-CM

## 2024-06-21 ENCOUNTER — APPOINTMENT (OUTPATIENT)
Dept: PRIMARY CARE | Facility: CLINIC | Age: 69
End: 2024-06-21
Payer: COMMERCIAL

## 2024-06-21 VITALS
WEIGHT: 124 LBS | TEMPERATURE: 97.8 F | HEIGHT: 64 IN | HEART RATE: 64 BPM | DIASTOLIC BLOOD PRESSURE: 78 MMHG | SYSTOLIC BLOOD PRESSURE: 120 MMHG | BODY MASS INDEX: 21.17 KG/M2

## 2024-06-21 DIAGNOSIS — Z00.00 MEDICARE ANNUAL WELLNESS VISIT, SUBSEQUENT: Primary | ICD-10-CM

## 2024-06-21 DIAGNOSIS — Z00.00 ROUTINE GENERAL MEDICAL EXAMINATION AT HEALTH CARE FACILITY: ICD-10-CM

## 2024-06-21 PROCEDURE — 1159F MED LIST DOCD IN RCRD: CPT | Performed by: INTERNAL MEDICINE

## 2024-06-21 PROCEDURE — 1036F TOBACCO NON-USER: CPT | Performed by: INTERNAL MEDICINE

## 2024-06-21 PROCEDURE — 99397 PER PM REEVAL EST PAT 65+ YR: CPT | Performed by: INTERNAL MEDICINE

## 2024-06-21 PROCEDURE — G0439 PPPS, SUBSEQ VISIT: HCPCS | Performed by: INTERNAL MEDICINE

## 2024-06-21 ASSESSMENT — ENCOUNTER SYMPTOMS
GASTROINTESTINAL NEGATIVE: 1
CONSTITUTIONAL NEGATIVE: 1
RESPIRATORY NEGATIVE: 1
MUSCULOSKELETAL NEGATIVE: 1
CARDIOVASCULAR NEGATIVE: 1
NEUROLOGICAL NEGATIVE: 1

## 2024-06-21 ASSESSMENT — PATIENT HEALTH QUESTIONNAIRE - PHQ9
SUM OF ALL RESPONSES TO PHQ9 QUESTIONS 1 & 2: 0
1. LITTLE INTEREST OR PLEASURE IN DOING THINGS: NOT AT ALL
2. FEELING DOWN, DEPRESSED OR HOPELESS: NOT AT ALL

## 2024-06-21 NOTE — PROGRESS NOTES
"Subjective   Reason for Visit: Kaelyn Mohamud is an 69 y.o. female here for a Medicare Wellness visit.          Reviewed all medications by prescribing practitioner or clinical pharmacist (such as prescriptions, OTCs, herbal therapies and supplements) and documented in the medical record.    HPI  Patient is here for Medicare wellness exam.  She is doing well after hiatal hernia surgery.  She gets issues with the anxiety especially at work since she had cut down on Zoloft.    Patient Care Team:  Natalie Lombardo MD as PCP - General     Review of Systems   Constitutional: Negative.    HENT: Negative.     Respiratory: Negative.     Cardiovascular: Negative.    Gastrointestinal: Negative.    Musculoskeletal: Negative.    Neurological: Negative.    Psychiatric/Behavioral:          Anxiety issues   All other systems reviewed and are negative.      Objective   Vitals:  /78   Pulse 64   Temp 36.6 °C (97.8 °F) (Temporal)   Ht 1.626 m (5' 4\")   Wt 56.2 kg (124 lb)   BMI 21.28 kg/m²       Physical Exam  Vitals reviewed.   Constitutional:       Appearance: Normal appearance.   Eyes:      Pupils: Pupils are equal, round, and reactive to light.   Cardiovascular:      Rate and Rhythm: Normal rate and regular rhythm.   Pulmonary:      Effort: Pulmonary effort is normal.      Breath sounds: Normal breath sounds.   Abdominal:      Palpations: Abdomen is soft.   Musculoskeletal:      Right lower leg: No edema.      Left lower leg: No edema.   Skin:     Findings: No lesion or rash.   Neurological:      General: No focal deficit present.      Mental Status: She is alert and oriented to person, place, and time.   Psychiatric:         Mood and Affect: Mood normal.         Behavior: Behavior normal.         Thought Content: Thought content normal.       Assessment/Plan   Problem List Items Addressed This Visit       Medicare annual wellness visit, subsequent - Primary    Routine general medical examination at [facility] " facility     Patient advised to go back on her original dose of sertraline.  She should try to retire if possible but she has to work to pay her bills.  She does have a history of thrombocytosis.  She is s/p hiatal hernia surgery.

## 2024-07-02 ENCOUNTER — LAB (OUTPATIENT)
Dept: LAB | Facility: LAB | Age: 69
End: 2024-07-02
Payer: COMMERCIAL

## 2024-07-02 DIAGNOSIS — N39.0 URINARY TRACT INFECTION WITHOUT HEMATURIA, SITE UNSPECIFIED: ICD-10-CM

## 2024-07-02 LAB
APPEARANCE UR: CLEAR
BILIRUB UR STRIP.AUTO-MCNC: NEGATIVE MG/DL
COLOR UR: NORMAL
GLUCOSE UR STRIP.AUTO-MCNC: NORMAL MG/DL
KETONES UR STRIP.AUTO-MCNC: NEGATIVE MG/DL
LEUKOCYTE ESTERASE UR QL STRIP.AUTO: NEGATIVE
NITRITE UR QL STRIP.AUTO: NEGATIVE
PH UR STRIP.AUTO: 7.5 [PH]
PROT UR STRIP.AUTO-MCNC: NEGATIVE MG/DL
RBC # UR STRIP.AUTO: NEGATIVE /UL
SP GR UR STRIP.AUTO: 1.01
UROBILINOGEN UR STRIP.AUTO-MCNC: NORMAL MG/DL

## 2024-07-02 PROCEDURE — 81003 URINALYSIS AUTO W/O SCOPE: CPT

## 2024-07-19 PROBLEM — M81.0 AGE-RELATED OSTEOPOROSIS WITHOUT CURRENT PATHOLOGICAL FRACTURE: Status: ACTIVE | Noted: 2024-07-19

## 2024-07-19 PROBLEM — Z91.89 FRACTURE RISK ASSESSMENT SCORE (FRAX) INDICATING GREATER THAN 20% RISK FOR MAJOR OSTEOPOROSIS-RELATED FRACTURE: Status: RESOLVED | Noted: 2024-07-19 | Resolved: 2024-07-19

## 2024-07-19 PROBLEM — Z91.89 FRACTURE RISK ASSESSMENT SCORE (FRAX) INDICATING GREATER THAN 20% RISK FOR MAJOR OSTEOPOROSIS-RELATED FRACTURE: Status: ACTIVE | Noted: 2024-07-19

## 2024-07-23 ENCOUNTER — APPOINTMENT (OUTPATIENT)
Dept: OBSTETRICS AND GYNECOLOGY | Facility: CLINIC | Age: 69
End: 2024-07-23
Payer: COMMERCIAL

## 2024-07-23 VITALS — WEIGHT: 128 LBS | BODY MASS INDEX: 21.97 KG/M2 | DIASTOLIC BLOOD PRESSURE: 78 MMHG | SYSTOLIC BLOOD PRESSURE: 120 MMHG

## 2024-07-23 DIAGNOSIS — Z91.89 FRACTURE RISK ASSESSMENT SCORE (FRAX) INDICATING GREATER THAN 20% RISK FOR MAJOR OSTEOPOROSIS-RELATED FRACTURE: ICD-10-CM

## 2024-07-23 DIAGNOSIS — M81.0 AGE-RELATED OSTEOPOROSIS WITHOUT CURRENT PATHOLOGICAL FRACTURE: ICD-10-CM

## 2024-07-23 PROCEDURE — 96372 THER/PROPH/DIAG INJ SC/IM: CPT | Performed by: OBSTETRICS & GYNECOLOGY

## 2024-07-23 ASSESSMENT — PAIN SCALES - GENERAL: PAINLEVEL: 0-NO PAIN

## 2024-07-23 NOTE — PROGRESS NOTES
Prolia injection today. Patient supply.   Reviewed and approved by RONNIE MAO on 7/23/24 at 11:27 AM.

## 2024-08-16 ENCOUNTER — APPOINTMENT (OUTPATIENT)
Dept: UROLOGY | Facility: CLINIC | Age: 69
End: 2024-08-16
Payer: COMMERCIAL

## 2024-08-16 VITALS
HEART RATE: 91 BPM | BODY MASS INDEX: 21.75 KG/M2 | RESPIRATION RATE: 16 BRPM | DIASTOLIC BLOOD PRESSURE: 84 MMHG | WEIGHT: 127.43 LBS | SYSTOLIC BLOOD PRESSURE: 135 MMHG | HEIGHT: 64 IN

## 2024-08-16 DIAGNOSIS — N32.89 BLADDER SPASMS: ICD-10-CM

## 2024-08-16 DIAGNOSIS — N39.0 URINARY TRACT INFECTION WITH HEMATURIA, SITE UNSPECIFIED: Primary | ICD-10-CM

## 2024-08-16 DIAGNOSIS — R31.9 URINARY TRACT INFECTION WITH HEMATURIA, SITE UNSPECIFIED: Primary | ICD-10-CM

## 2024-08-16 PROCEDURE — 1126F AMNT PAIN NOTED NONE PRSNT: CPT | Performed by: UROLOGY

## 2024-08-16 PROCEDURE — 3008F BODY MASS INDEX DOCD: CPT | Performed by: UROLOGY

## 2024-08-16 PROCEDURE — 99213 OFFICE O/P EST LOW 20 MIN: CPT | Performed by: UROLOGY

## 2024-08-16 PROCEDURE — 1159F MED LIST DOCD IN RCRD: CPT | Performed by: UROLOGY

## 2024-08-16 PROCEDURE — 1160F RVW MEDS BY RX/DR IN RCRD: CPT | Performed by: UROLOGY

## 2024-08-16 PROCEDURE — 1036F TOBACCO NON-USER: CPT | Performed by: UROLOGY

## 2024-08-16 ASSESSMENT — ENCOUNTER SYMPTOMS
DYSURIA: 0
DIFFICULTY URINATING: 0
HEMATURIA: 0

## 2024-08-16 ASSESSMENT — PAIN SCALES - GENERAL: PAINLEVEL: 0-NO PAIN

## 2024-08-16 NOTE — PROGRESS NOTES
Provider Impressions     69 year-old white female self-referred, previous patient of Dr. Oliver Smallwood, for RECURRENT UTI AND BLADDER SPASMS. Urinalysis is negative for blood. CAT scan of the chest, abdomen and pelvis is negative. Renal bladder ultrasound identifies a 3 mm right RENAL CALCULUS. Urine culture was negative but then a second urine culture with was positive for Klebsiella, UTI. Patient retired in 2018, previously employed in [employer]. No family history of breast or prostate cancer. The patient has never smoked cigarettes.      MACROBID  allergy     April 17, 2023, patient arrives alone. She states she has had Recurrent UTIs with Urgency and Frequency. She states that she has had URETHRAL STRICTURES. We discussed the etiology of recurrent UTIs and poor bladder emptying. She still has her uterus and ovaries. She is sexually active. She takes showers, not tub baths. She does not use a swimming pool, hot tub or bicycle. She has been diagnosed with a URETHRAL STRICTURE in the past. We will initiate cystoscopy with urethral dilatation, flow studies and a discussion of treatment options. She may require an alpha agent. Urine culture was negative as well as urine cytology     May 19, 2023, CYSTOSCOPY WITH URETHRAL DILATATION a 20 Vietnamese, flow studies and a discussion of treatment options. DENSE URETHRAL STRICTURE. Severe erythema in the lower half of the bladder. We will increase to 22 Vietnamese and 2 months. Flow rate 9 cc/s PVR 0 cc. Urinalysis and urine culture were negative. We will not add an alpha agent for antibiotic at this time.     August 9, 2023, cystoscopy with urethral dilatation to 20 Vietnamese. Attempts to 22 were unsuccessful due to pain. However the patient had a recent UTI and we will attempt that again at her next visit. Flow rate 2 cc/s, total volume 17 cc, PVR 0 cc. She states that after her first dilatation she noticed a dramatic improvement in flow and emptying. We will try to continue  to increase the size and strongly consider a low-dose daily cephalosporin if necessary.     August 26, 2023, cystoscopy with urethral dilatation to 22 Djib[place]ian of a dense urethral stricture upward sloping. Moderate discomfort with no bleeding. Significant erythema in the bladder base and trigone with a very clear line of demarcation. No tumors or stones. Flow rate 2 cc/s, total volume 19 cc, PVR 0 cc. Urinalysis is negative for blood and urine culture no growth. She will return in 4 weeks and we will increase the size to 24 Djiboutian at patient's request.     September 16, 2023, telephone call, urine culture was positive for E. coli treated with Bactrim     September 23, 2023, cystoscopy with dilatation of a dense urethral stricture to 24 Djiboutian with moderate bleeding and moderate pain. Erythema confined to the bladder base and trigone. We will not increase the size as this appears to be her limit. . I have added Bactrim single strength daily at this time.. She will return in 4 weeks and then we will increase times weekly     December 16, 2023, cystoscopy with dilatation of a dense urethral stricture to 24 [place]iboutian with minimal bleeding and moderate pain. Erythema confined to the trigone. We will not increase the size as this appears to be her limit. . I have added Bactrim single strength daily at this time. She will return in 5 weeks and then we will increase times weekly     January 20, 2024, telephone call, urine culture is positive for E. coli.  Keflex ordered.  Resistant to Bactrim.     January 23, 2024, cystoscopy with dilatation of a dense urethral stricture to 24 Djiboutian with minimal pain and minimal bleeding.  Once again erythema confined to the trigone.  24 Djiboutian is the patient's limit.  We will change her daily Bactrim to Keflex as it was resistant to her most recent E. coli infection.  We will expand to 6-week regimen.     March 5, 2024, cystoscopy with dilatation of a dense upward sloping urethral stricture to  "24 Iranian with moderate discomfort.  Erythema confined to the bladder base and trigone.  The patient cannot tolerate dilation larger than 24 Iranian.  She maintains her daily Keflex.  We will expand to 7 weeks.     April 23, 2024, cystoscopy with dilatation of a dense upward sloping urethral stricture to 24 Iranian with minimal discomfort.  Erythema again in the bladder base and trigone.  This patient cannot tolerate dilatation larger than the 24 Iranian size.  We will transition her from daily Keflex to twice a week Keflex at the 500 mg dose.  We will expand her regimen to 8 weeks.  She wishes to continue as she has less frequency, less urgency, and less nocturia.  Renal colic CAT scan does not identify any stones in the kidneys or ureters.  Urine culture no growth.  Urinalysis was negative for blood.    August 16, 2024, patient arrives alone.  As we have discussed before, the patient was in control regarding continuation of dilatation.  She felt that she no longer had frequency, urgency or nocturia and discontinued plans for any further urethral dilatation.  She returns today complaining of urinary frequency, urinary urgency, and that her urine is \"blocked\" when she is trying to urinate.  She states that she is now straining to urinate and wishes to reinitiate a schedule once again.  She continues on Keflex twice a week 500 mg.     PLAN:     1. The patient will return in 4 weeks  INCREASE TIME PERIODS WEEKLY. for cystoscopy with urethral dilatation to 24 Iranian, flow studies and a URINALYSIS AND URINE CULTURE. She will receive PROPHYLAXIS WITH KEFLEX 250 mg p.o. every 12 hours for 4 doses. We will hold the size at 24 Iranian       #2  Keflex 500 mg p.o. q. Wednesday and q. Sunday     3.  In April 2025, cystoscopy with dilatation of 24 Iranian, flow rate, PVR, urinalysis, urine culture, renal colic CAT scan     Physical Exam  Vitals and nursing note reviewed. Exam conducted with a chaperone present.   Constitutional:  "      Appearance: Normal appearance.   HENT:      Head: Normocephalic and atraumatic.   Pulmonary:      Effort: Pulmonary effort is normal.   Abdominal:      Palpations: Abdomen is soft.      Tenderness: There is no abdominal tenderness.   Genitourinary:     General: Normal vulva.      Vagina: No vaginal discharge.   Musculoskeletal:         General: Normal range of motion.      Cervical back: Normal range of motion and neck supple.   Neurological:      General: No focal deficit present.      Mental Status: She is alert and oriented to person, place, and time.   Psychiatric:         Mood and Affect: Mood normal.         Behavior: Behavior normal.        This note was created with voice-recognition software and was not corrected for typographical or grammatical errors

## 2024-08-16 NOTE — PATIENT INSTRUCTIONS
Patient Discussion/Summary     It was very nice to see you again today. We had a long discussion regarding recurrent urinary tract infections and urethral strictures. You had determined that you no longer required dilatations and I had agreed with you on your decision.  However now, you believe that you are straining to urinate and once again have urinary frequency, urgency, and nighttime urination.  You would like to initiate your regimen once again.      This note was created with voice-recognition software and was not corrected for typographical or grammatical errors

## 2024-08-16 NOTE — PROGRESS NOTES
Patient denies any pain today. Patient has not had any recent surgeries or hospital admits. Patient denies any concerns about falling or safety. Patient has concerns for uti symptom, nocturia, urgency, incomplete emptying, bladder pressure. Patient currently on an antibiotic Augmentin for tooth infection. CV    Review of Systems   Genitourinary:  Positive for decreased urine volume, enuresis and urgency. Negative for difficulty urinating, dysuria and hematuria.   All other systems reviewed and are negative.

## 2024-08-16 NOTE — LETTER
August 16, 2024     Natalie Lombardo MD  [address]  [facility] [address]    Patient: Kaelyn Mohamud   YOB: 1955   Date of Visit: 8/16/2024       Dear Dr. Natalie Lombardo MD:    Thank you for referring Kaelyn Mohamud to me for evaluation. Below are my notes for this consultation.  If you have questions, please do not hesitate to call me. I look forward to following your patient along with you.       Sincerely,     Dale Wagoner MD      CC: No Recipients  ______________________________________________________________________________________      Provider Impressions     69 year-old white female self-referred, previous patient of Dr. Oliver Smallwood, for RECURRENT UTI AND BLADDER SPASMS. Urinalysis is negative for blood. CAT scan of the chest, abdomen and pelvis is negative. Renal bladder ultrasound identifies a 3 mm right RENAL CALCULUS. Urine culture was negative but then a second urine culture with was positive for Klebsiella, UTI. Patient retired in 2018, previously employed in [employer]. No family history of breast or prostate cancer. The patient has never smoked cigarettes.      MACROBID  allergy     April 17, 2023, patient arrives alone. She states she has had Recurrent UTIs with Urgency and Frequency. She states that she has had URETHRAL STRICTURES. We discussed the etiology of recurrent UTIs and poor bladder emptying. She still has her uterus and ovaries. She is sexually active. She takes showers, not tub baths. She does not use a swimming pool, hot tub or bicycle. She has been diagnosed with a URETHRAL STRICTURE in the past. We will initiate cystoscopy with urethral dilatation, flow studies and a discussion of treatment options. She may require an alpha agent. Urine culture was negative as well as urine cytology     May 19, 2023, CYSTOSCOPY WITH URETHRAL DILATATION a 20 Malay, flow studies and a discussion of treatment options. DENSE URETHRAL STRICTURE. Severe  erythema in the lower half of the bladder. We will increase to 22 Yemeni and 2 months. Flow rate 9 cc/s PVR 0 cc. Urinalysis and urine culture were negative. We will not add an alpha agent for antibiotic at this time.     August 9, 2023, cystoscopy with urethral dilatation to 20 Yemeni. Attempts to 22 were unsuccessful due to pain. However the patient had a recent UTI and we will attempt that again at her next visit. Flow rate 2 cc/s, total volume 17 cc, PVR 0 cc. She states that after her first dilatation she noticed a dramatic improvement in flow and emptying. We will try to continue to increase the size and strongly consider a low-dose daily cephalosporin if necessary.     August 26, 2023, cystoscopy with urethral dilatation to 22 Yemeni of a dense urethral stricture upward sloping. Moderate discomfort with no bleeding. Significant erythema in the bladder base and trigone with a very clear line of demarcation. No tumors or stones. Flow rate 2 cc/s, total volume 19 cc, PVR 0 cc. Urinalysis is negative for blood and urine culture no growth. She will return in 4 weeks and we will increase the size to 24 Yemeni at patient's request.     September 16, 2023, telephone call, urine culture was positive for E. coli treated with Bactrim     September 23, 2023, cystoscopy with dilatation of a dense urethral stricture to 24 Yemeni with moderate bleeding and moderate pain. Erythema confined to the bladder base and trigone. We will not increase the size as this appears to be her limit. . I have added Bactrim single strength daily at this time.. She will return in 4 weeks and then we will increase times weekly     December 16, 2023, cystoscopy with dilatation of a dense urethral stricture to 24 Yemeni with minimal bleeding and moderate pain. Erythema confined to the trigone. We will not increase the size as this appears to be her limit. . I have added Bactrim single strength daily at this time. She will return in 5 weeks and  "then we will increase times weekly     January 20, 2024, telephone call, urine culture is positive for E. coli.  Keflex ordered.  Resistant to Bactrim.     January 23, 2024, cystoscopy with dilatation of a dense urethral stricture to 24 Polish with minimal pain and minimal bleeding.  Once again erythema confined to the trigone.  24 Polish is the patient's limit.  We will change her daily Bactrim to Keflex as it was resistant to her most recent E. coli infection.  We will expand to 6-week regimen.     March 5, 2024, cystoscopy with dilatation of a dense upward sloping urethral stricture to 24 Polish with moderate discomfort.  Erythema confined to the bladder base and trigone.  The patient cannot tolerate dilation larger than 24 Polish.  She maintains her daily Keflex.  We will expand to 7 weeks.     April 23, 2024, cystoscopy with dilatation of a dense upward sloping urethral stricture to 24 Polish with minimal discomfort.  Erythema again in the bladder base and trigone.  This patient cannot tolerate dilatation larger than the 24 Polish size.  We will transition her from daily Keflex to twice a week Keflex at the 500 mg dose.  We will expand her regimen to 8 weeks.  She wishes to continue as she has less frequency, less urgency, and less nocturia.  Renal colic CAT scan does not identify any stones in the kidneys or ureters.  Urine culture no growth.  Urinalysis was negative for blood.    August 16, 2024, patient arrives alone.  As we have discussed before, the patient was in control regarding continuation of dilatation.  She felt that she no longer had frequency, urgency or nocturia and discontinued plans for any further urethral dilatation.  She returns today complaining of urinary frequency, urinary urgency, and that her urine is \"blocked\" when she is trying to urinate.  She states that she is now straining to urinate and wishes to reinitiate a schedule once again.  She continues on Keflex twice a week 500 mg.   "   PLAN:     1. The patient will return in 4 weeks  INCREASE TIME PERIODS WEEKLY. for cystoscopy with urethral dilatation to 24 Northern Irish, flow studies and a URINALYSIS AND URINE CULTURE. She will receive PROPHYLAXIS WITH KEFLEX 250 mg p.o. every 12 hours for 4 doses. We will hold the size at 24 Northern Irish       #2  Keflex 500 mg p.o. q. Wednesday and q. Sunday     3.  In April 2025, cystoscopy with dilatation of 24 Northern Irish, flow rate, PVR, urinalysis, urine culture, renal colic CAT scan     Physical Exam  Vitals and nursing note reviewed. Exam conducted with a chaperone present.   Constitutional:       Appearance: Normal appearance.   HENT:      Head: Normocephalic and atraumatic.   Pulmonary:      Effort: Pulmonary effort is normal.   Abdominal:      Palpations: Abdomen is soft.      Tenderness: There is no abdominal tenderness.   Genitourinary:     General: Normal vulva.      Vagina: No vaginal discharge.   Musculoskeletal:         General: Normal range of motion.      Cervical back: Normal range of motion and neck supple.   Neurological:      General: No focal deficit present.      Mental Status: She is alert and oriented to person, place, and time.   Psychiatric:         Mood and Affect: Mood normal.         Behavior: Behavior normal.        This note was created with voice-recognition software and was not corrected for typographical or grammatical errors

## 2024-08-27 ENCOUNTER — LAB (OUTPATIENT)
Dept: LAB | Facility: LAB | Age: 69
End: 2024-08-27
Payer: COMMERCIAL

## 2024-08-27 DIAGNOSIS — N39.0 URINARY TRACT INFECTION WITH HEMATURIA, SITE UNSPECIFIED: ICD-10-CM

## 2024-08-27 DIAGNOSIS — R31.9 URINARY TRACT INFECTION WITH HEMATURIA, SITE UNSPECIFIED: ICD-10-CM

## 2024-08-27 LAB
APPEARANCE UR: ABNORMAL
BILIRUB UR STRIP.AUTO-MCNC: NEGATIVE MG/DL
COLOR UR: ABNORMAL
GLUCOSE UR STRIP.AUTO-MCNC: NORMAL MG/DL
KETONES UR STRIP.AUTO-MCNC: NEGATIVE MG/DL
LEUKOCYTE ESTERASE UR QL STRIP.AUTO: ABNORMAL
MUCOUS THREADS #/AREA URNS AUTO: ABNORMAL /LPF
NITRITE UR QL STRIP.AUTO: NEGATIVE
PH UR STRIP.AUTO: 5.5 [PH]
PROT UR STRIP.AUTO-MCNC: ABNORMAL MG/DL
RBC # UR STRIP.AUTO: ABNORMAL /UL
RBC #/AREA URNS AUTO: ABNORMAL /HPF
SP GR UR STRIP.AUTO: 1.02
SQUAMOUS #/AREA URNS AUTO: ABNORMAL /HPF
UROBILINOGEN UR STRIP.AUTO-MCNC: NORMAL MG/DL
WBC #/AREA URNS AUTO: >50 /HPF
WBC CLUMPS #/AREA URNS AUTO: ABNORMAL /HPF

## 2024-08-27 PROCEDURE — 81001 URINALYSIS AUTO W/SCOPE: CPT

## 2024-08-27 PROCEDURE — 87086 URINE CULTURE/COLONY COUNT: CPT

## 2024-08-28 LAB — BACTERIA UR CULT: NORMAL

## 2024-09-09 ENCOUNTER — APPOINTMENT (OUTPATIENT)
Dept: UROLOGY | Facility: CLINIC | Age: 69
End: 2024-09-09
Payer: COMMERCIAL

## 2024-09-09 VITALS
BODY MASS INDEX: 21.85 KG/M2 | RESPIRATION RATE: 18 BRPM | SYSTOLIC BLOOD PRESSURE: 152 MMHG | WEIGHT: 128 LBS | DIASTOLIC BLOOD PRESSURE: 92 MMHG | HEART RATE: 74 BPM | HEIGHT: 64 IN

## 2024-09-09 DIAGNOSIS — N39.0 URINARY TRACT INFECTION WITHOUT HEMATURIA, SITE UNSPECIFIED: ICD-10-CM

## 2024-09-09 DIAGNOSIS — R31.9 URINARY TRACT INFECTION WITH HEMATURIA, SITE UNSPECIFIED: ICD-10-CM

## 2024-09-09 DIAGNOSIS — N39.0 URINARY TRACT INFECTION WITH HEMATURIA, SITE UNSPECIFIED: ICD-10-CM

## 2024-09-09 DIAGNOSIS — N35.12 POSTINFECTIVE URETHRAL STRICTURE IN FEMALE: Primary | ICD-10-CM

## 2024-09-09 DIAGNOSIS — N35.92 STRICTURE OF FEMALE URETHRA, UNSPECIFIED STRICTURE TYPE: ICD-10-CM

## 2024-09-09 LAB
POC APPEARANCE, URINE: CLEAR
POC BILIRUBIN, URINE: NEGATIVE
POC BLOOD, URINE: NEGATIVE
POC COLOR, URINE: YELLOW
POC GLUCOSE, URINE: NEGATIVE MG/DL
POC KETONES, URINE: NEGATIVE MG/DL
POC LEUKOCYTES, URINE: NEGATIVE
POC NITRITE,URINE: NEGATIVE
POC PH, URINE: 7 PH
POC PROTEIN, URINE: NEGATIVE MG/DL
POC SPECIFIC GRAVITY, URINE: 1.02
POC UROBILINOGEN, URINE: 0.2 EU/DL

## 2024-09-09 PROCEDURE — 51798 US URINE CAPACITY MEASURE: CPT | Performed by: UROLOGY

## 2024-09-09 PROCEDURE — 81003 URINALYSIS AUTO W/O SCOPE: CPT | Performed by: UROLOGY

## 2024-09-09 PROCEDURE — 52281 CYSTOSCOPY AND TREATMENT: CPT | Performed by: UROLOGY

## 2024-09-09 NOTE — PROGRESS NOTES
Provider Impressions     69 year-old white female self-referred, previous patient of Dr. Oliver Smallwood, for RECURRENT UTI AND BLADDER SPASMS. Urinalysis is negative for blood. CAT scan of the chest, abdomen and pelvis is negative. Renal bladder ultrasound identifies a 3 mm right RENAL CALCULUS. Urine culture was negative but then a second urine culture with was positive for Klebsiella, UTI. Patient retired in 2018, previously employed in [employer]. No family history of breast or prostate cancer. The patient has never smoked cigarettes.      MACROBID  allergy     April 17, 2023, patient arrives alone. She states she has had Recurrent UTIs with Urgency and Frequency. She states that she has had URETHRAL STRICTURES. We discussed the etiology of recurrent UTIs and poor bladder emptying. She still has her uterus and ovaries. She is sexually active. She takes showers, not tub baths. She does not use a swimming pool, hot tub or bicycle. She has been diagnosed with a URETHRAL STRICTURE in the past. We will initiate cystoscopy with urethral dilatation, flow studies and a discussion of treatment options. She may require an alpha agent. Urine culture was negative as well as urine cytology     May 19, 2023, CYSTOSCOPY WITH URETHRAL DILATATION a 20 Zambian, flow studies and a discussion of treatment options. DENSE URETHRAL STRICTURE. Severe erythema in the lower half of the bladder. We will increase to 22 [place]ambian and 2 months. Flow rate 9 cc/s PVR 0 cc. Urinalysis and urine culture were negative. We will not add an alpha agent for antibiotic at this time.     August 9, 2023, cystoscopy with urethral dilatation to 20 [place]ian. Attempts to 22 were unsuccessful due to pain. However the patient had a recent UTI and we will attempt that again at her next visit. Flow rate 2 cc/s, total volume 17 cc, PVR 0 cc. She states that after her first dilatation she noticed a dramatic improvement in flow and emptying. We will try to  continue to increase the size and strongly consider a low-dose daily cephalosporin if necessary.     August 26, 2023, cystoscopy with urethral dilatation to 22 Citizen of [place] of a dense urethral stricture upward sloping. Moderate discomfort with no bleeding. Significant erythema in the bladder base and trigone with a very clear line of demarcation. No tumors or stones. Flow rate 2 cc/s, total volume 19 cc, PVR 0 cc. Urinalysis is negative for blood and urine culture no growth. She will return in 4 weeks and we will increase the size to 24 Citizen of [place] at patient's request.     September 16, 2023, telephone call, urine culture was positive for E. coli treated with Bactrim     September 23, 2023, cystoscopy with dilatation of a dense urethral stricture to 24 Citizen of [place] with moderate bleeding and moderate pain. Erythema confined to the bladder base and trigone. We will not increase the size as this appears to be her limit. . I have added Bactrim single strength daily at this time.. She will return in 4 weeks and then we will increase times weekly     December 16, 2023, cystoscopy with dilatation of a dense urethral stricture to 24 Citizen of [place] with minimal bleeding and moderate pain. Erythema confined to the trigone. We will not increase the size as this appears to be her limit. . I have added Bactrim single strength daily at this time. She will return in 5 weeks and then we will increase times weekly     January 20, 2024, telephone call, urine culture is positive for E. coli.  Keflex ordered.  Resistant to Bactrim.     January 23, 2024, cystoscopy with dilatation of a dense urethral stricture to 24 Citizen of [place] with minimal pain and minimal bleeding.  Once again erythema confined to the trigone.  24 Citizen of [place] is the patient's limit.  We will change her daily Bactrim to Keflex as it was resistant to her most recent E. coli infection.  We will expand to 6-week regimen.     March 5, 2024, cystoscopy with dilatation of a dense upward sloping urethral  "stricture to 24 [place] with moderate discomfort.  Erythema confined to the bladder base and trigone.  The patient cannot tolerate dilation larger than 24 [place].  She maintains her daily Keflex.  We will expand to 7 weeks.     April 23, 2024, cystoscopy with dilatation of a dense upward sloping urethral stricture to 24 [place] with minimal discomfort.  Erythema again in the bladder base and trigone.  This patient cannot tolerate dilatation larger than the 24 [place] size.  We will transition her from daily Keflex to twice a week Keflex at the 500 mg dose.  We will expand her regimen to 8 weeks.  She wishes to continue as she has less frequency, less urgency, and less nocturia.  Renal colic CAT scan does not identify any stones in the kidneys or ureters.  Urine culture no growth.  Urinalysis was negative for blood.     August 16, 2024, patient arrives alone.  As we have discussed before, the patient was in control regarding continuation of dilatation.  She felt that she no longer had frequency, urgency or nocturia and discontinued plans for any further urethral dilatation.  She returns today complaining of urinary frequency, urinary urgency, and that her urine is \"blocked\" when she is trying to urinate.  She states that she is now straining to urinate and wishes to reinitiate a schedule once again.  She continues on Keflex twice a week 500 mg.    September 9, 2024, cystoscopy with urethral dilatation to 24 P[place] with moderate pain and no bleeding.  Erythema posteriorly.  Dense upward sloping urethral stricture.  She would like to start her regimen again and will return in 4 weeks.  PVR of 83 cc.  She continues on Keflex 500 mg twice a week.  Urinalysis was negative for blood and urine culture no growth.     PLAN:     1. The patient will return in 4 weeks  INCREASE TIME PERIODS WEEKLY. for cystoscopy with urethral dilatation to 24 P[place], PVR and a URINALYSIS AND URINE CULTURE. She will receive PROPHYLAXIS WITH KEFLEX " 250 mg p.o. every 12 hours for 4 doses. We will HOLD the size at 24 Maori       #2  Keflex 500 mg p.o. q. Wednesday and q. Sunday     3.  In April 2025, cystoscopy with dilatation to 24 Maori, PVR, urinalysis, urine culture, renal colic CAT scan     Physical Exam  Vitals and nursing note reviewed. Exam conducted with a chaperone present.   Constitutional:       Appearance: Normal appearance.   HENT:      Head: Normocephalic and atraumatic.   Pulmonary:      Effort: Pulmonary effort is normal.   Abdominal:      Palpations: Abdomen is soft.      Tenderness: There is no abdominal tenderness.   Genitourinary:     General: Normal vulva.      Vagina: No vaginal discharge.   Musculoskeletal:         General: Normal range of motion.      Cervical back: Normal range of motion and neck supple.   Neurological:      General: No focal deficit present.      Mental Status: She is alert and oriented to person, place, and time.   Psychiatric:         Mood and Affect: Mood normal.         Behavior: Behavior normal.        This note was created with voice-recognition software and was not corrected for typographical or grammatical errors

## 2024-09-09 NOTE — PROGRESS NOTES
"Pt took macrodantin 50mg po as prescribed  Anesthesia: Local 2% Lidocaine  Instruments: 6F flexible disposable Cystoscope    Pt brought to procedure room and placed in dorsal lithotomy position. Pt draped and prepped in normal sterile fashion. 5ml lidocaine instilled into urethral meatus and 5ml instilled into urethra (vagina). Pt tolerated well.    I was present as chaperone for the entirety of the procedure   Imelda Santos  Cystoscopy performed by Dr. Dale Wagoner      Bedside \"Time Out\" Verification   Today's Date:  09/09/2024. I attest that this time out verification took place prior to the procedure.   Procedure: Cysto   RN/LPN/MA:    Provider: [facility].   Verified By: MA, Imelda Santos and Provider, Dr. Dale Wagoner.   Prior to the start of the procedure a time out was taken and the following were verified: the identity of the patient using two patient identifiers, the correct procedure, the correct site marked as indicated, the correct positioning for the patient and the correct equipment was obtained.   Cystoscopy - Kaelyn Mohamud   identified using two (2) forms of identification.   Procedure: diagnostic Cystourethroscopy   Procedure Note: Time Started: 2:00pm Time Completed: 9:59 AM  Indications for procedure: Cysto/Dil- urethral stricture, rec uti     Discussed with patient: Risks, benefits, and alternative were discussed in detail. Patient appears to understand and agrees to proceed. Patient has signed the procedure consent form.    CYSTOSCOPY:    Cystoscopy today reveals a dense, upward sloping urethral stricture dilated to 24 Bhutanese with moderate pain and no bleeding.  Once inside the bladder, both ureteral orifices were identified.  A full examination of the bladder did not reveal any evidence of stones or tumors.  Erythema was confined posteriorly.  PVR 83 cc  "

## 2024-09-09 NOTE — LETTER
September 9, 2024     Natalie Lombardo MD  [address]  [facility] [address]    Patient: Kaelyn Mohamud   YOB: 1955   Date of Visit: 9/9/2024       Dear Dr. Natalie Lombardo MD:    Thank you for referring Kaelyn Mohamud to me for evaluation. Below are my notes for this consultation.  If you have questions, please do not hesitate to call me. I look forward to following your patient along with you.       Sincerely,     Dale Wagoner MD      CC: No Recipients  ______________________________________________________________________________________        Provider Impressions     69 year-old white female self-referred, previous patient of Dr. Oliver Smallwood, for RECURRENT UTI AND BLADDER SPASMS. Urinalysis is negative for blood. CAT scan of the chest, abdomen and pelvis is negative. Renal bladder ultrasound identifies a 3 mm right RENAL CALCULUS. Urine culture was negative but then a second urine culture with was positive for Klebsiella, UTI. Patient retired in 2018, previously employed in [employer]. No family history of breast or prostate cancer. The patient has never smoked cigarettes.      MACROBID  allergy     April 17, 2023, patient arrives alone. She states she has had Recurrent UTIs with Urgency and Frequency. She states that she has had URETHRAL STRICTURES. We discussed the etiology of recurrent UTIs and poor bladder emptying. She still has her uterus and ovaries. She is sexually active. She takes showers, not tub baths. She does not use a swimming pool, hot tub or bicycle. She has been diagnosed with a URETHRAL STRICTURE in the past. We will initiate cystoscopy with urethral dilatation, flow studies and a discussion of treatment options. She may require an alpha agent. Urine culture was negative as well as urine cytology     May 19, 2023, CYSTOSCOPY WITH URETHRAL DILATATION a 20 Slovak, flow studies and a discussion of treatment options. DENSE URETHRAL STRICTURE.  Severe erythema in the lower half of the bladder. We will increase to 22 Georgian and 2 months. Flow rate 9 cc/s PVR 0 cc. Urinalysis and urine culture were negative. We will not add an alpha agent for antibiotic at this time.     August 9, 2023, cystoscopy with urethral dilatation to 20 Georgian. Attempts to 22 were unsuccessful due to pain. However the patient had a recent UTI and we will attempt that again at her next visit. Flow rate 2 cc/s, total volume 17 cc, PVR 0 cc. She states that after her first dilatation she noticed a dramatic improvement in flow and emptying. We will try to continue to increase the size and strongly consider a low-dose daily cephalosporin if necessary.     August 26, 2023, cystoscopy with urethral dilatation to 22 Georgian of a dense urethral stricture upward sloping. Moderate discomfort with no bleeding. Significant erythema in the bladder base and trigone with a very clear line of demarcation. No tumors or stones. Flow rate 2 cc/s, total volume 19 cc, PVR 0 cc. Urinalysis is negative for blood and urine culture no growth. She will return in 4 weeks and we will increase the size to 24 Georgian at patient's request.     September 16, 2023, telephone call, urine culture was positive for E. coli treated with Bactrim     September 23, 2023, cystoscopy with dilatation of a dense urethral stricture to 24 Georgian with moderate bleeding and moderate pain. Erythema confined to the bladder base and trigone. We will not increase the size as this appears to be her limit. . I have added Bactrim single strength daily at this time.. She will return in 4 weeks and then we will increase times weekly     December 16, 2023, cystoscopy with dilatation of a dense urethral stricture to 24 Georgian with minimal bleeding and moderate pain. Erythema confined to the trigone. We will not increase the size as this appears to be her limit. . I have added Bactrim single strength daily at this time. She will return in 5 weeks  "and then we will increase times weekly     January 20, 2024, telephone call, urine culture is positive for E. coli.  Keflex ordered.  Resistant to Bactrim.     January 23, 2024, cystoscopy with dilatation of a dense urethral stricture to 24 St Helenian with minimal pain and minimal bleeding.  Once again erythema confined to the trigone.  24 St Helen[place] is the patient's limit.  We will change her daily Bactrim to Keflex as it was resistant to her most recent E. coli infection.  We will expand to 6-week regimen.     March 5, 2024, cystoscopy with dilatation of a dense upward sloping urethral stricture to 24 [place] with moderate discomfort.  Erythema confined to the bladder base and trigone.  The patient cannot tolerate dilation larger than 24 [place].  She maintains her daily Keflex.  We will expand to 7 weeks.     April 23, 2024, cystoscopy with dilatation of a dense upward sloping urethral stricture to 24 [place] with minimal discomfort.  Erythema again in the bladder base and trigone.  This patient cannot tolerate dilatation larger than the 24 [place] size.  We will transition her from daily Keflex to twice a week Keflex at the 500 mg dose.  We will expand her regimen to 8 weeks.  She wishes to continue as she has less frequency, less urgency, and less nocturia.  Renal colic CAT scan does not identify any stones in the kidneys or ureters.  Urine culture no growth.  Urinalysis was negative for blood.     August 16, 2024, patient arrives alone.  As we have discussed before, the patient was in control regarding continuation of dilatation.  She felt that she no longer had frequency, urgency or nocturia and discontinued plans for any further urethral dilatation.  She returns today complaining of urinary frequency, urinary urgency, and that her urine is \"blocked\" when she is trying to urinate.  She states that she is now straining to urinate and wishes to reinitiate a schedule once again.  She continues on Keflex twice a week 500 " mg.    September 9, 2024, cystoscopy with urethral dilatation to 24 Marshallese with moderate pain and no bleeding.  Erythema posteriorly.  Dense upward sloping urethral stricture.  She would like to start her regimen again and will return in 4 weeks.  PVR of 83 cc.  She continues on Keflex 500 mg twice a week.  Urinalysis was negative for blood and urine culture no growth.     PLAN:     1. The patient will return in 4 weeks  INCREASE TIME PERIODS WEEKLY. for cystoscopy with urethral dilatation to 24 Marshallese, PVR and a URINALYSIS AND URINE CULTURE. She will receive PROPHYLAXIS WITH KEFLEX 250 mg p.o. every 12 hours for 4 doses. We will HOLD the size at 24 Marshallese       #2  Keflex 500 mg p.o. q. Wednesday and q. Sunday     3.  In April 2025, cystoscopy with dilatation to 24 Marshallese, PVR, urinalysis, urine culture, renal colic CAT scan     Physical Exam  Vitals and nursing note reviewed. Exam conducted with a chaperone present.   Constitutional:       Appearance: Normal appearance.   HENT:      Head: Normocephalic and atraumatic.   Pulmonary:      Effort: Pulmonary effort is normal.   Abdominal:      Palpations: Abdomen is soft.      Tenderness: There is no abdominal tenderness.   Genitourinary:     General: Normal vulva.      Vagina: No vaginal discharge.   Musculoskeletal:         General: Normal range of motion.      Cervical back: Normal range of motion and neck supple.   Neurological:      General: No focal deficit present.      Mental Status: She is alert and oriented to person, place, and time.   Psychiatric:         Mood and Affect: Mood normal.         Behavior: Behavior normal.        This note was created with voice-recognition software and was not corrected for typographical or grammatical errors

## 2024-09-09 NOTE — PATIENT INSTRUCTIONS
Patient Discussion/Summary     It was very nice to see you again today. We had a long discussion regarding recurrent urinary tract infections and urethral strictures. You had determined that you no longer required dilatations and I had agreed with you on your decision.  However now, you believe that you are straining to urinate and once again have urinary frequency, urgency, and nighttime urination.  You would like to initiate your regimen once again.  Today you were dilated to 24 Singaporean with moderate discomfort.  We will restart the regimen and I will see you again in 4 weeks.      This note was created with voice-recognition software and was not corrected for typographical or grammatical errors

## 2024-09-25 ENCOUNTER — LAB (OUTPATIENT)
Dept: LAB | Facility: LAB | Age: 69
End: 2024-09-25
Payer: COMMERCIAL

## 2024-09-25 DIAGNOSIS — N39.0 URINARY TRACT INFECTION WITHOUT HEMATURIA, SITE UNSPECIFIED: ICD-10-CM

## 2024-09-25 LAB
APPEARANCE UR: CLEAR
BILIRUB UR STRIP.AUTO-MCNC: NEGATIVE MG/DL
COLOR UR: NORMAL
GLUCOSE UR STRIP.AUTO-MCNC: NORMAL MG/DL
HOLD SPECIMEN: NORMAL
KETONES UR STRIP.AUTO-MCNC: NEGATIVE MG/DL
LEUKOCYTE ESTERASE UR QL STRIP.AUTO: NEGATIVE
NITRITE UR QL STRIP.AUTO: NEGATIVE
PH UR STRIP.AUTO: 6 [PH]
PROT UR STRIP.AUTO-MCNC: NEGATIVE MG/DL
RBC # UR STRIP.AUTO: NEGATIVE /UL
SP GR UR STRIP.AUTO: 1.01
UROBILINOGEN UR STRIP.AUTO-MCNC: NORMAL MG/DL

## 2024-09-25 PROCEDURE — 81003 URINALYSIS AUTO W/O SCOPE: CPT

## 2024-09-25 PROCEDURE — 87086 URINE CULTURE/COLONY COUNT: CPT

## 2024-09-26 LAB — BACTERIA UR CULT: NO GROWTH

## 2024-10-03 RX ORDER — CEPHALEXIN 250 MG/1
250 CAPSULE ORAL 2 TIMES WEEKLY
Qty: 30 CAPSULE | Refills: 3 | Status: SHIPPED | OUTPATIENT
Start: 2024-10-03 | End: 2025-09-28

## 2024-10-09 ENCOUNTER — APPOINTMENT (OUTPATIENT)
Dept: UROLOGY | Facility: CLINIC | Age: 69
End: 2024-10-09
Payer: COMMERCIAL

## 2024-10-09 VITALS
HEIGHT: 64 IN | BODY MASS INDEX: 21.53 KG/M2 | HEART RATE: 79 BPM | WEIGHT: 126.1 LBS | SYSTOLIC BLOOD PRESSURE: 137 MMHG | RESPIRATION RATE: 16 BRPM | DIASTOLIC BLOOD PRESSURE: 81 MMHG

## 2024-10-09 DIAGNOSIS — N39.0 RECURRENT UTI: ICD-10-CM

## 2024-10-09 DIAGNOSIS — N35.12 POSTINFECTIVE URETHRAL STRICTURE IN FEMALE: Primary | ICD-10-CM

## 2024-10-09 DIAGNOSIS — N32.89 BLADDER SPASMS: ICD-10-CM

## 2024-10-09 DIAGNOSIS — N35.92 STRICTURE OF FEMALE URETHRA, UNSPECIFIED STRICTURE TYPE: ICD-10-CM

## 2024-10-09 LAB
POC APPEARANCE, URINE: CLEAR
POC BILIRUBIN, URINE: NEGATIVE
POC BLOOD, URINE: NEGATIVE
POC COLOR, URINE: ABNORMAL
POC GLUCOSE, URINE: NEGATIVE MG/DL
POC KETONES, URINE: ABNORMAL MG/DL
POC LEUKOCYTES, URINE: NEGATIVE
POC NITRITE,URINE: NEGATIVE
POC PH, URINE: 6.5 PH
POC PROTEIN, URINE: NEGATIVE MG/DL
POC SPECIFIC GRAVITY, URINE: 1.01
POC UROBILINOGEN, URINE: 0.2 EU/DL

## 2024-10-09 PROCEDURE — 81003 URINALYSIS AUTO W/O SCOPE: CPT | Performed by: UROLOGY

## 2024-10-09 PROCEDURE — 52281 CYSTOSCOPY AND TREATMENT: CPT | Performed by: UROLOGY

## 2024-10-09 PROCEDURE — 51798 US URINE CAPACITY MEASURE: CPT | Performed by: UROLOGY

## 2024-10-09 NOTE — PROGRESS NOTES
Provider Impressions     69 year-old white female self-referred, previous patient of Dr. Oliver Smallwood, for RECURRENT UTI AND BLADDER SPASMS. Urinalysis is negative for blood. CAT scan of the chest, abdomen and pelvis is negative. Renal bladder ultrasound identifies a 3 mm right RENAL CALCULUS. Urine culture was negative but then a second urine culture with was positive for Klebsiella, UTI. Patient retired in 2018, previously employed in [employer]. No family history of breast or prostate cancer. The patient has never smoked cigarettes.      MACROBID  allergy     April 17, 2023, patient arrives alone. She states she has had Recurrent UTIs with Urgency and Frequency. She states that she has had URETHRAL STRICTURES. We discussed the etiology of recurrent UTIs and poor bladder emptying. She still has her uterus and ovaries. She is sexually active. She takes showers, not tub baths. She does not use a swimming pool, hot tub or bicycle. She has been diagnosed with a URETHRAL STRICTURE in the past. We will initiate cystoscopy with urethral dilatation, flow studies and a discussion of treatment options. She may require an alpha agent. Urine culture was negative as well as urine cytology     May 19, 2023, CYSTOSCOPY WITH URETHRAL DILATATION a 20 Mosotho, flow studies and a discussion of treatment options. DENSE URETHRAL STRICTURE. Severe erythema in the lower half of the bladder. We will increase to 22 Mosotho and 2 months. Flow rate 9 cc/s PVR 0 cc. Urinalysis and urine culture were negative. We will not add an alpha agent for antibiotic at this time.     August 9, 2023, cystoscopy with urethral dilatation to 20 Mosotho. Attempts to 22 were unsuccessful due to pain. However the patient had a recent UTI and we will attempt that again at her next visit. Flow rate 2 cc/s, total volume 17 cc, PVR 0 cc. She states that after her first dilatation she noticed a dramatic improvement in flow and emptying. We will try to  continue to increase the size and strongly consider a low-dose daily cephalosporin if necessary.     August 26, 2023, cystoscopy with urethral dilatation to 22 Australian of a dense urethral stricture upward sloping. Moderate discomfort with no bleeding. Significant erythema in the bladder base and trigone with a very clear line of demarcation. No tumors or stones. Flow rate 2 cc/s, total volume 19 cc, PVR 0 cc. Urinalysis is negative for blood and urine culture no growth. She will return in 4 weeks and we will increase the size to 24 Australian at patient's request.     September 16, 2023, telephone call, urine culture was positive for E. coli treated with Bactrim     September 23, 2023, cystoscopy with dilatation of a dense urethral stricture to 24 Australian with moderate bleeding and moderate pain. Erythema confined to the bladder base and trigone. We will not increase the size as this appears to be her limit. . I have added Bactrim single strength daily at this time.. She will return in 4 weeks and then we will increase times weekly     December 16, 2023, cystoscopy with dilatation of a dense urethral stricture to 24 Australian with minimal bleeding and moderate pain. Erythema confined to the trigone. We will not increase the size as this appears to be her limit. . I have added Bactrim single strength daily at this time. She will return in 5 weeks and then we will increase times weekly     January 20, 2024, telephone call, urine culture is positive for E. coli.  Keflex ordered.  Resistant to Bactrim.     January 23, 2024, cystoscopy with dilatation of a dense urethral stricture to 24 Australian with minimal pain and minimal bleeding.  Once again erythema confined to the trigone.  24 Australian is the patient's limit.  We will change her daily Bactrim to Keflex as it was resistant to her most recent E. coli infection.  We will expand to 6-week regimen.     March 5, 2024, cystoscopy with dilatation of a dense upward sloping urethral  "stricture to 24 Niuean with moderate discomfort.  Erythema confined to the bladder base and trigone.  The patient cannot tolerate dilation larger than 24 Niuean.  She maintains her daily Keflex.  We will expand to 7 weeks.     April 23, 2024, cystoscopy with dilatation of a dense upward sloping urethral stricture to 24 Niuean with minimal discomfort.  Erythema again in the bladder base and trigone.  This patient cannot tolerate dilatation larger than the 24 Niuean size.  We will transition her from daily Keflex to twice a week Keflex at the 500 mg dose.  We will expand her regimen to 8 weeks.  She wishes to continue as she has less frequency, less urgency, and less nocturia.  Renal colic CAT scan does not identify any stones in the kidneys or ureters.  Urine culture no growth.  Urinalysis was negative for blood.     August 16, 2024, patient arrives alone.  As we have discussed before, the patient was in control regarding continuation of dilatation.  She felt that she no longer had frequency, urgency or nocturia and discontinued plans for any further urethral dilatation.  She returns today complaining of urinary frequency, urinary urgency, and that her urine is \"blocked\" when she is trying to urinate.  She states that she is now straining to urinate and wishes to reinitiate a schedule once again.  She continues on Keflex twice a week 500 mg.     September 9, 2024, cystoscopy with urethral dilatation to 24 Niuean with moderate pain and no bleeding.  Erythema posteriorly.  Dense upward sloping urethral stricture.  She would like to start her regimen again and will return in 4 weeks.  PVR of 83 cc.  She continues on Keflex 500 mg twice a week.  Urinalysis was negative for blood and urine culture no growth.    October 9, 2024, cystoscopy with urethral dilatation to 24 Niuean with minimal pain and no bleeding.  Mild erythema in the bladder base.  Dense upward sloping urethral stricture.  No further straining for " urination, no further urinary frequency and no further postvoid dribbling.  PVR of 16 cc.  We will expand her regimen to 5 weeks.  Urinalysis was negative for blood and urine culture no growth.  Continues on Keflex 500 mg twice a week.     PLAN:     1. The patient will return in 5 weeks  INCREASE TIME PERIODS WEEKLY. for cystoscopy with urethral dilatation to 24 Togolese, PVR and a URINALYSIS AND URINE CULTURE. She will receive PROPHYLAXIS WITH KEFLEX 250 mg p.o. every 12 hours for 4 doses. We will HOLD the size at 24 Togolese       #2  Keflex 500 mg p.o. q. Wednesday and q. Sunday     3.  In April 2025, cystoscopy with dilatation to 24 Togolese, PVR, urinalysis, urine culture, renal colic CAT scan     Physical Exam  Vitals and nursing note reviewed. Exam conducted with a chaperone present.   Constitutional:       Appearance: Normal appearance.   HENT:      Head: Normocephalic and atraumatic.   Pulmonary:      Effort: Pulmonary effort is normal.   Abdominal:      Palpations: Abdomen is soft.      Tenderness: There is no abdominal tenderness.   Genitourinary:     General: Normal vulva.      Vagina: No vaginal discharge.   Musculoskeletal:         General: Normal range of motion.      Cervical back: Normal range of motion and neck supple.   Neurological:      General: No focal deficit present.      Mental Status: She is alert and oriented to person, place, and time.   Psychiatric:         Mood and Affect: Mood normal.         Behavior: Behavior normal.        This note was created with voice-recognition software and was not corrected for typographical or grammatical errors

## 2024-10-09 NOTE — PATIENT INSTRUCTIONS
Patient Discussion/Summary     It was very nice to see you again today. We had a long discussion regarding recurrent urinary tract infections and urethral strictures. You had determined that you no longer required dilatations and I had agreed with you on your decision.  However now, you believe that you are straining to urinate and once again have urinary frequency, urgency, and nighttime urination.  You would like to initiate your regimen once again.  Today you were dilated to 24 Kyrgyz with moderate discomfort.  We will restart the regimen and I will see you again in 5 weeks.      This note was created with voice-recognition software and was not corrected for typographical or grammatical errors

## 2024-10-09 NOTE — PROGRESS NOTES
"Pt took macrodantin 50mg po as prescribed  Anesthesia: Local 2% Lidocaine  Instruments: 6F flexible disposable Cystoscope    Pt brought to procedure room and placed in dorsal lithotomy position. Pt draped and prepped in normal sterile fashion. 5ml lidocaine instilled into urethral meatus and 5ml instilled into urethra (urethra). Pt tolerated well.    I was present as chaperone for the entirety of the procedure   Imelda Santos  Cystoscopy performed by Dr. Dale Wagoner      Bedside \"Time Out\" Verification   Today's Date:  10/9/2024. I attest that this time out verification took place prior to the procedure.   Procedure: Cysto   RN/LPN/MA:    Provider: [facility].   Verified By: MA, Imelda Santos and Provider, Dr. Dale Wagoner.   Prior to the start of the procedure a time out was taken and the following were verified: the identity of the patient using two patient identifiers, the correct procedure, the correct site marked as indicated, the correct positioning for the patient and the correct equipment was obtained.   Cystoscopy - Kaelyn Mohamud   identified using two (2) forms of identification.   Procedure: diagnostic Cystourethroscopy   Procedure Note: Time Started: 2:00pm  Time Completed: 1:49 PM  Indications for procedure: Cysto/Dil 24F- Urethral Stricture     Discussed with patient: Risks, benefits, and alternative were discussed in detail. Patient appears to understand and agrees to proceed. Patient has signed the procedure consent form.    CYSTOSCOPY:    Cystoscopy today reveals a dense upward sloping urethral stricture dilated to 24 Sinhala with moderate pain and no bleeding.  Once inside the bladder, both ureteral orifices were identified.  No evidence of stones or tumors.  PVR 16 cc.  "

## 2024-10-09 NOTE — LETTER
October 9, 2024     Natalie Lombardo MD  [address]  [facility] [address]    Patient: Kaelyn Mohamud   YOB: 1955   Date of Visit: 10/9/2024       Dear Dr. Natalie Lombardo MD:    Thank you for referring Kaelyn Mohamud to me for evaluation. Below are my notes for this consultation.  If you have questions, please do not hesitate to call me. I look forward to following your patient along with you.       Sincerely,     Dale Wagoner MD      CC: No Recipients  ______________________________________________________________________________________        Provider Impressions     69 year-old white female self-referred, previous patient of Dr. Oliver Smallwood, for RECURRENT UTI AND BLADDER SPASMS. Urinalysis is negative for blood. CAT scan of the chest, abdomen and pelvis is negative. Renal bladder ultrasound identifies a 3 mm right RENAL CALCULUS. Urine culture was negative but then a second urine culture with was positive for Klebsiella, UTI. Patient retired in 2018, previously employed in [employer]. No family history of breast or prostate cancer. The patient has never smoked cigarettes.      MACROBID  allergy     April 17, 2023, patient arrives alone. She states she has had Recurrent UTIs with Urgency and Frequency. She states that she has had URETHRAL STRICTURES. We discussed the etiology of recurrent UTIs and poor bladder emptying. She still has her uterus and ovaries. She is sexually active. She takes showers, not tub baths. She does not use a swimming pool, hot tub or bicycle. She has been diagnosed with a URETHRAL STRICTURE in the past. We will initiate cystoscopy with urethral dilatation, flow studies and a discussion of treatment options. She may require an alpha agent. Urine culture was negative as well as urine cytology     May 19, 2023, CYSTOSCOPY WITH URETHRAL DILATATION a 20 Swedish, flow studies and a discussion of treatment options. DENSE URETHRAL STRICTURE. Severe  erythema in the lower half of the bladder. We will increase to 22 Brazilian and 2 months. Flow rate 9 cc/s PVR 0 cc. Urinalysis and urine culture were negative. We will not add an alpha agent for antibiotic at this time.     August 9, 2023, cystoscopy with urethral dilatation to 20 Brazilian. Attempts to 22 were unsuccessful due to pain. However the patient had a recent UTI and we will attempt that again at her next visit. Flow rate 2 cc/s, total volume 17 cc, PVR 0 cc. She states that after her first dilatation she noticed a dramatic improvement in flow and emptying. We will try to continue to increase the size and strongly consider a low-dose daily cephalosporin if necessary.     August 26, 2023, cystoscopy with urethral dilatation to 22 Brazilian of a dense urethral stricture upward sloping. Moderate discomfort with no bleeding. Significant erythema in the bladder base and trigone with a very clear line of demarcation. No tumors or stones. Flow rate 2 cc/s, total volume 19 cc, PVR 0 cc. Urinalysis is negative for blood and urine culture no growth. She will return in 4 weeks and we will increase the size to 24 Brazilian at patient's request.     September 16, 2023, telephone call, urine culture was positive for E. coli treated with Bactrim     September 23, 2023, cystoscopy with dilatation of a dense urethral stricture to 24 Brazilian with moderate bleeding and moderate pain. Erythema confined to the bladder base and trigone. We will not increase the size as this appears to be her limit. . I have added Bactrim single strength daily at this time.. She will return in 4 weeks and then we will increase times weekly     December 16, 2023, cystoscopy with dilatation of a dense urethral stricture to 24 Brazilian with minimal bleeding and moderate pain. Erythema confined to the trigone. We will not increase the size as this appears to be her limit. . I have added Bactrim single strength daily at this time. She will return in 5 weeks and  "then we will increase times weekly     January 20, 2024, telephone call, urine culture is positive for E. coli.  Keflex ordered.  Resistant to Bactrim.     January 23, 2024, cystoscopy with dilatation of a dense urethral stricture to 24 Mexican with minimal pain and minimal bleeding.  Once again erythema confined to the trigone.  24 Mexican is the patient's limit.  We will change her daily Bactrim to Keflex as it was resistant to her most recent E. coli infection.  We will expand to 6-week regimen.     March 5, 2024, cystoscopy with dilatation of a dense upward sloping urethral stricture to 24 Mexican with moderate discomfort.  Erythema confined to the bladder base and trigone.  The patient cannot tolerate dilation larger than 24 Mexican.  She maintains her daily Keflex.  We will expand to 7 weeks.     April 23, 2024, cystoscopy with dilatation of a dense upward sloping urethral stricture to 24 Mexican with minimal discomfort.  Erythema again in the bladder base and trigone.  This patient cannot tolerate dilatation larger than the 24 Mexican size.  We will transition her from daily Keflex to twice a week Keflex at the 500 mg dose.  We will expand her regimen to 8 weeks.  She wishes to continue as she has less frequency, less urgency, and less nocturia.  Renal colic CAT scan does not identify any stones in the kidneys or ureters.  Urine culture no growth.  Urinalysis was negative for blood.     August 16, 2024, patient arrives alone.  As we have discussed before, the patient was in control regarding continuation of dilatation.  She felt that she no longer had frequency, urgency or nocturia and discontinued plans for any further urethral dilatation.  She returns today complaining of urinary frequency, urinary urgency, and that her urine is \"blocked\" when she is trying to urinate.  She states that she is now straining to urinate and wishes to reinitiate a schedule once again.  She continues on Keflex twice a week 500 mg.   "   September 9, 2024, cystoscopy with urethral dilatation to 24 Cymraes with moderate pain and no bleeding.  Erythema posteriorly.  Dense upward sloping urethral stricture.  She would like to start her regimen again and will return in 4 weeks.  PVR of 83 cc.  She continues on Keflex 500 mg twice a week.  Urinalysis was negative for blood and urine culture no growth.    October 9, 2024, cystoscopy with urethral dilatation to 24 Cymraes with minimal pain and no bleeding.  Mild erythema in the bladder base.  Dense upward sloping urethral stricture.  No further straining for urination, no further urinary frequency and no further postvoid dribbling.  PVR of 16 cc.  We will expand her regimen to 5 weeks.  Urinalysis was negative for blood and urine culture no growth.  Continues on Keflex 500 mg twice a week.     PLAN:     1. The patient will return in 5 weeks  INCREASE TIME PERIODS WEEKLY. for cystoscopy with urethral dilatation to 24 Cymraes, PVR and a URINALYSIS AND URINE CULTURE. She will receive PROPHYLAXIS WITH KEFLEX 250 mg p.o. every 12 hours for 4 doses. We will HOLD the size at 24 Cymraes       #2  Keflex 500 mg p.o. q. Wednesday and q. Sunday     3.  In April 2025, cystoscopy with dilatation to 24 Cymraes, PVR, urinalysis, urine culture, renal colic CAT scan     Physical Exam  Vitals and nursing note reviewed. Exam conducted with a chaperone present.   Constitutional:       Appearance: Normal appearance.   HENT:      Head: Normocephalic and atraumatic.   Pulmonary:      Effort: Pulmonary effort is normal.   Abdominal:      Palpations: Abdomen is soft.      Tenderness: There is no abdominal tenderness.   Genitourinary:     General: Normal vulva.      Vagina: No vaginal discharge.   Musculoskeletal:         General: Normal range of motion.      Cervical back: Normal range of motion and neck supple.   Neurological:      General: No focal deficit present.      Mental Status: She is alert and oriented to person, place, and  time.   Psychiatric:         Mood and Affect: Mood normal.         Behavior: Behavior normal.        This note was created with voice-recognition software and was not corrected for typographical or grammatical errors

## 2024-10-11 ENCOUNTER — APPOINTMENT (OUTPATIENT)
Dept: UROLOGY | Facility: CLINIC | Age: 69
End: 2024-10-11
Payer: COMMERCIAL

## 2024-10-18 ENCOUNTER — APPOINTMENT (OUTPATIENT)
Dept: PRIMARY CARE | Facility: CLINIC | Age: 69
End: 2024-10-18
Payer: COMMERCIAL

## 2024-10-18 VITALS
SYSTOLIC BLOOD PRESSURE: 132 MMHG | DIASTOLIC BLOOD PRESSURE: 76 MMHG | WEIGHT: 127 LBS | BODY MASS INDEX: 21.8 KG/M2 | HEART RATE: 64 BPM | TEMPERATURE: 97.1 F

## 2024-10-18 DIAGNOSIS — R53.83 FATIGUE, UNSPECIFIED TYPE: ICD-10-CM

## 2024-10-18 DIAGNOSIS — K21.9 GASTROESOPHAGEAL REFLUX DISEASE WITHOUT ESOPHAGITIS: Primary | ICD-10-CM

## 2024-10-18 DIAGNOSIS — D47.3 ESSENTIAL (HEMORRHAGIC) THROMBOCYTHEMIA: ICD-10-CM

## 2024-10-18 DIAGNOSIS — F32.A ANXIETY AND DEPRESSION: ICD-10-CM

## 2024-10-18 DIAGNOSIS — E78.2 MIXED HYPERLIPIDEMIA: ICD-10-CM

## 2024-10-18 DIAGNOSIS — F41.9 ANXIETY AND DEPRESSION: ICD-10-CM

## 2024-10-18 PROCEDURE — 1123F ACP DISCUSS/DSCN MKR DOCD: CPT | Performed by: INTERNAL MEDICINE

## 2024-10-18 PROCEDURE — 1159F MED LIST DOCD IN RCRD: CPT | Performed by: INTERNAL MEDICINE

## 2024-10-18 PROCEDURE — 99213 OFFICE O/P EST LOW 20 MIN: CPT | Performed by: INTERNAL MEDICINE

## 2024-10-18 PROCEDURE — 1160F RVW MEDS BY RX/DR IN RCRD: CPT | Performed by: INTERNAL MEDICINE

## 2024-10-18 PROCEDURE — 1158F ADVNC CARE PLAN TLK DOCD: CPT | Performed by: INTERNAL MEDICINE

## 2024-10-18 ASSESSMENT — ENCOUNTER SYMPTOMS
HEMATOLOGIC/LYMPHATIC NEGATIVE: 1
EYES NEGATIVE: 1
NEUROLOGICAL NEGATIVE: 1
RESPIRATORY NEGATIVE: 1
CONSTITUTIONAL NEGATIVE: 1
ENDOCRINE NEGATIVE: 1

## 2024-10-18 ASSESSMENT — PATIENT HEALTH QUESTIONNAIRE - PHQ9
1. LITTLE INTEREST OR PLEASURE IN DOING THINGS: NOT AT ALL
SUM OF ALL RESPONSES TO PHQ9 QUESTIONS 1 & 2: 0
2. FEELING DOWN, DEPRESSED OR HOPELESS: NOT AT ALL

## 2024-10-18 NOTE — PROGRESS NOTES
Subjective   Patient ID: Kaelyn Mohamud is a 69 y.o. female who presents for Follow-up (Follow up).    HPI patient here for follow-up history of depression anxiety and hyperlipidemia.  She has history of hiatal hernia s/p surgery she also has history of cholecystectomy in the past    Review of Systems   Constitutional: Negative.    HENT: Negative.     Eyes: Negative.    Respiratory: Negative.     Endocrine: Negative.    Genitourinary: Negative.    Neurological: Negative.    Hematological: Negative.    All other systems reviewed and are negative.      Objective   /76   Pulse 64   Temp 36.2 °C (97.1 °F) (Temporal)   Wt 57.6 kg (127 lb)   BMI 21.80 kg/m²     Physical Exam  Vitals reviewed.   Constitutional:       Appearance: Normal appearance.   HENT:      Head: Normocephalic.   Cardiovascular:      Rate and Rhythm: Normal rate and regular rhythm.   Pulmonary:      Effort: Pulmonary effort is normal.      Breath sounds: Normal breath sounds.   Abdominal:      Palpations: Abdomen is soft.   Musculoskeletal:      Right lower leg: No edema.      Left lower leg: No edema.   Neurological:      General: No focal deficit present.      Mental Status: She is oriented to person, place, and time. Mental status is at baseline.      Cranial Nerves: No cranial nerve deficit.       Assessment/Plan   Problem List Items Addressed This Visit             ICD-10-CM    Anxiety and depression F41.9, F32.A    Fatigue R53.83    Relevant Orders    CBC and Auto Differential    TSH with reflex to Free T4 if abnormal    GERD (gastroesophageal reflux disease) - Primary K21.9    Hyperlipidemia E78.5    Relevant Orders    Comprehensive Metabolic Panel    Lipid Panel    Cholesterol, LDL Direct    Essential (hemorrhagic) thrombocythemia D47.3     Patient advised to get blood work done.  Continue statins for hyperlipidemia, blood patient is on Prolia with history of osteoporosis.  Work will be done to check her platelets she has history of  thrombocytosis and essential thrombocythemia

## 2024-11-01 ENCOUNTER — LAB (OUTPATIENT)
Dept: LAB | Facility: LAB | Age: 69
End: 2024-11-01
Payer: COMMERCIAL

## 2024-11-01 DIAGNOSIS — N39.0 RECURRENT UTI: ICD-10-CM

## 2024-11-01 DIAGNOSIS — R06.00 DYSPNEA, UNSPECIFIED TYPE: ICD-10-CM

## 2024-11-01 DIAGNOSIS — N35.92 STRICTURE OF FEMALE URETHRA, UNSPECIFIED STRICTURE TYPE: ICD-10-CM

## 2024-11-01 DIAGNOSIS — E78.2 MIXED HYPERLIPIDEMIA: ICD-10-CM

## 2024-11-01 DIAGNOSIS — R06.2 WHEEZING: ICD-10-CM

## 2024-11-01 DIAGNOSIS — R53.83 FATIGUE, UNSPECIFIED TYPE: ICD-10-CM

## 2024-11-01 LAB
ALBUMIN SERPL BCP-MCNC: 4.2 G/DL (ref 3.4–5)
ALP SERPL-CCNC: 60 U/L (ref 33–136)
ALT SERPL W P-5'-P-CCNC: 10 U/L (ref 7–45)
ANION GAP SERPL CALC-SCNC: 11 MMOL/L (ref 10–20)
APPEARANCE UR: CLEAR
AST SERPL W P-5'-P-CCNC: 15 U/L (ref 9–39)
BASOPHILS # BLD AUTO: 0.05 X10*3/UL (ref 0–0.1)
BASOPHILS NFR BLD AUTO: 0.9 %
BILIRUB SERPL-MCNC: 0.2 MG/DL (ref 0–1.2)
BILIRUB UR STRIP.AUTO-MCNC: NEGATIVE MG/DL
BUN SERPL-MCNC: 15 MG/DL (ref 6–23)
CALCIUM SERPL-MCNC: 9.1 MG/DL (ref 8.6–10.3)
CHLORIDE SERPL-SCNC: 106 MMOL/L (ref 98–107)
CHOLEST SERPL-MCNC: 208 MG/DL (ref 0–199)
CHOLESTEROL/HDL RATIO: 2
CO2 SERPL-SCNC: 29 MMOL/L (ref 21–32)
COLOR UR: NORMAL
CREAT SERPL-MCNC: 0.62 MG/DL (ref 0.5–1.05)
EGFRCR SERPLBLD CKD-EPI 2021: >90 ML/MIN/1.73M*2
EOSINOPHIL # BLD AUTO: 0.14 X10*3/UL (ref 0–0.7)
EOSINOPHIL NFR BLD AUTO: 2.6 %
ERYTHROCYTE [DISTWIDTH] IN BLOOD BY AUTOMATED COUNT: 14.6 % (ref 11.5–14.5)
GLUCOSE SERPL-MCNC: 91 MG/DL (ref 74–99)
GLUCOSE UR STRIP.AUTO-MCNC: NORMAL MG/DL
HCT VFR BLD AUTO: 37.6 % (ref 36–46)
HDLC SERPL-MCNC: 106.2 MG/DL
HGB BLD-MCNC: 11.9 G/DL (ref 12–16)
HOLD SPECIMEN: NORMAL
IMM GRANULOCYTES # BLD AUTO: 0.03 X10*3/UL (ref 0–0.7)
IMM GRANULOCYTES NFR BLD AUTO: 0.6 % (ref 0–0.9)
KETONES UR STRIP.AUTO-MCNC: NEGATIVE MG/DL
LDLC SERPL CALC-MCNC: 85 MG/DL
LDLC SERPL DIRECT ASSAY-MCNC: 78 MG/DL (ref 0–129)
LEUKOCYTE ESTERASE UR QL STRIP.AUTO: NEGATIVE
LYMPHOCYTES # BLD AUTO: 1.59 X10*3/UL (ref 1.2–4.8)
LYMPHOCYTES NFR BLD AUTO: 29.9 %
MCH RBC QN AUTO: 29 PG (ref 26–34)
MCHC RBC AUTO-ENTMCNC: 31.6 G/DL (ref 32–36)
MCV RBC AUTO: 92 FL (ref 80–100)
MONOCYTES # BLD AUTO: 0.67 X10*3/UL (ref 0.1–1)
MONOCYTES NFR BLD AUTO: 12.6 %
NEUTROPHILS # BLD AUTO: 2.84 X10*3/UL (ref 1.2–7.7)
NEUTROPHILS NFR BLD AUTO: 53.4 %
NITRITE UR QL STRIP.AUTO: NEGATIVE
NON HDL CHOLESTEROL: 102 MG/DL (ref 0–149)
NRBC BLD-RTO: 0 /100 WBCS (ref 0–0)
PH UR STRIP.AUTO: 5.5 [PH]
PLATELET # BLD AUTO: 440 X10*3/UL (ref 150–450)
POTASSIUM SERPL-SCNC: 4.5 MMOL/L (ref 3.5–5.3)
PROT SERPL-MCNC: 6.6 G/DL (ref 6.4–8.2)
PROT UR STRIP.AUTO-MCNC: NEGATIVE MG/DL
RBC # BLD AUTO: 4.1 X10*6/UL (ref 4–5.2)
RBC # UR STRIP.AUTO: NEGATIVE /UL
SODIUM SERPL-SCNC: 141 MMOL/L (ref 136–145)
SP GR UR STRIP.AUTO: 1.01
T4 FREE SERPL-MCNC: 0.66 NG/DL (ref 0.61–1.12)
TRIGL SERPL-MCNC: 85 MG/DL (ref 0–149)
TSH SERPL-ACNC: 6 MIU/L (ref 0.44–3.98)
UROBILINOGEN UR STRIP.AUTO-MCNC: NORMAL MG/DL
VLDL: 17 MG/DL (ref 0–40)
WBC # BLD AUTO: 5.3 X10*3/UL (ref 4.4–11.3)

## 2024-11-01 PROCEDURE — 80061 LIPID PANEL: CPT

## 2024-11-01 PROCEDURE — 36415 COLL VENOUS BLD VENIPUNCTURE: CPT

## 2024-11-01 PROCEDURE — 87086 URINE CULTURE/COLONY COUNT: CPT

## 2024-11-01 PROCEDURE — 83721 ASSAY OF BLOOD LIPOPROTEIN: CPT

## 2024-11-01 PROCEDURE — 84439 ASSAY OF FREE THYROXINE: CPT

## 2024-11-01 PROCEDURE — 81003 URINALYSIS AUTO W/O SCOPE: CPT

## 2024-11-01 PROCEDURE — 80053 COMPREHEN METABOLIC PANEL: CPT

## 2024-11-01 PROCEDURE — 84443 ASSAY THYROID STIM HORMONE: CPT

## 2024-11-01 PROCEDURE — 85025 COMPLETE CBC W/AUTO DIFF WBC: CPT

## 2024-11-01 RX ORDER — ALBUTEROL SULFATE 90 UG/1
2 INHALANT RESPIRATORY (INHALATION) EVERY 4 HOURS PRN
Qty: 8 G | Refills: 5 | Status: SHIPPED | OUTPATIENT
Start: 2024-11-01

## 2024-11-02 LAB — BACTERIA UR CULT: NORMAL

## 2024-11-08 ENCOUNTER — APPOINTMENT (OUTPATIENT)
Dept: RADIOLOGY | Facility: HOSPITAL | Age: 69
End: 2024-11-08
Payer: COMMERCIAL

## 2024-11-08 ENCOUNTER — HOSPITAL ENCOUNTER (EMERGENCY)
Facility: HOSPITAL | Age: 69
Discharge: HOME | End: 2024-11-08
Payer: COMMERCIAL

## 2024-11-08 VITALS
WEIGHT: 126 LBS | BODY MASS INDEX: 21.51 KG/M2 | HEART RATE: 75 BPM | SYSTOLIC BLOOD PRESSURE: 175 MMHG | HEIGHT: 64 IN | RESPIRATION RATE: 18 BRPM | OXYGEN SATURATION: 100 % | TEMPERATURE: 98.2 F | DIASTOLIC BLOOD PRESSURE: 83 MMHG

## 2024-11-08 DIAGNOSIS — J20.8 VIRAL BRONCHITIS: Primary | ICD-10-CM

## 2024-11-08 LAB
FLUAV RNA RESP QL NAA+PROBE: NOT DETECTED
FLUBV RNA RESP QL NAA+PROBE: NOT DETECTED
SARS-COV-2 RNA RESP QL NAA+PROBE: NOT DETECTED

## 2024-11-08 PROCEDURE — 71045 X-RAY EXAM CHEST 1 VIEW: CPT | Performed by: RADIOLOGY

## 2024-11-08 PROCEDURE — 71045 X-RAY EXAM CHEST 1 VIEW: CPT

## 2024-11-08 PROCEDURE — 99283 EMERGENCY DEPT VISIT LOW MDM: CPT | Mod: 25

## 2024-11-08 PROCEDURE — 2500000005 HC RX 250 GENERAL PHARMACY W/O HCPCS

## 2024-11-08 PROCEDURE — 87636 SARSCOV2 & INF A&B AMP PRB: CPT

## 2024-11-08 RX ORDER — ACETAMINOPHEN 500 MG
1000 TABLET ORAL EVERY 8 HOURS PRN
Qty: 42 TABLET | Refills: 0 | Status: SHIPPED | OUTPATIENT
Start: 2024-11-08 | End: 2024-11-15

## 2024-11-08 RX ORDER — ONDANSETRON 4 MG/1
4 TABLET, ORALLY DISINTEGRATING ORAL ONCE
Status: COMPLETED | OUTPATIENT
Start: 2024-11-08 | End: 2024-11-08

## 2024-11-08 RX ORDER — GUAIFENESIN 600 MG/1
1200 TABLET, EXTENDED RELEASE ORAL 2 TIMES DAILY
Qty: 28 TABLET | Refills: 0 | Status: SHIPPED | OUTPATIENT
Start: 2024-11-08 | End: 2024-11-15

## 2024-11-08 RX ORDER — PREDNISONE 20 MG/1
20 TABLET ORAL 2 TIMES DAILY
Qty: 10 TABLET | Refills: 0 | Status: SHIPPED | OUTPATIENT
Start: 2024-11-08 | End: 2024-11-13

## 2024-11-08 RX ORDER — ONDANSETRON 4 MG/1
4 TABLET, ORALLY DISINTEGRATING ORAL EVERY 8 HOURS PRN
Qty: 20 TABLET | Refills: 0 | Status: SHIPPED | OUTPATIENT
Start: 2024-11-08 | End: 2024-11-15

## 2024-11-08 RX ORDER — OXYMETAZOLINE HCL 0.05 %
2 SPRAY, NON-AEROSOL (ML) NASAL EVERY 12 HOURS PRN
Qty: 30 ML | Refills: 0 | Status: SHIPPED | OUTPATIENT
Start: 2024-11-08 | End: 2024-11-10

## 2024-11-08 RX ORDER — AZITHROMYCIN 250 MG/1
250 TABLET, FILM COATED ORAL DAILY
Qty: 6 TABLET | Refills: 0 | Status: SHIPPED | OUTPATIENT
Start: 2024-11-08 | End: 2024-11-13

## 2024-11-08 ASSESSMENT — PAIN - FUNCTIONAL ASSESSMENT: PAIN_FUNCTIONAL_ASSESSMENT: 0-10

## 2024-11-08 ASSESSMENT — LIFESTYLE VARIABLES
HAVE PEOPLE ANNOYED YOU BY CRITICIZING YOUR DRINKING: NO
EVER HAD A DRINK FIRST THING IN THE MORNING TO STEADY YOUR NERVES TO GET RID OF A HANGOVER: NO
HAVE YOU EVER FELT YOU SHOULD CUT DOWN ON YOUR DRINKING: NO
TOTAL SCORE: 0
EVER FELT BAD OR GUILTY ABOUT YOUR DRINKING: NO

## 2024-11-08 ASSESSMENT — PAIN SCALES - GENERAL: PAINLEVEL_OUTOF10: 0 - NO PAIN

## 2024-11-08 ASSESSMENT — COLUMBIA-SUICIDE SEVERITY RATING SCALE - C-SSRS
2. HAVE YOU ACTUALLY HAD ANY THOUGHTS OF KILLING YOURSELF?: NO
6. HAVE YOU EVER DONE ANYTHING, STARTED TO DO ANYTHING, OR PREPARED TO DO ANYTHING TO END YOUR LIFE?: NO
1. IN THE PAST MONTH, HAVE YOU WISHED YOU WERE DEAD OR WISHED YOU COULD GO TO SLEEP AND NOT WAKE UP?: NO

## 2024-11-08 NOTE — ED PROVIDER NOTES
HPI   Chief Complaint   Patient presents with    Flu Symptoms     Flu symptoms x 4 days. Pt c/o nonproductive cough,SOB,  and diarrhea. Pt denies any other symptoms.       History provided by: Patient    Limitations to history: None    CC: Flulike symptoms    HPI: 69-year-old female with a history of osteoarthritis, seasonal allergies, GERD, asthma, hypertension, hyperlipidemia presents the emergency department to be evaluated to be evaluated for flulike symptoms.  Patient states that she has had the symptoms for about a week.  She reports a nonproductive cough but feels that she does have some sputum that she just cannot produce.  Denies shortness of breath or chest pain.  She has a history of asthma and uses an inhaler as needed but denies any other heart or lung history including ACS, arrhythmia, heart failure, DVT or PE.  Denies pleuritic pain or hemoptysis.  Patient also reports nausea and a few episodes of watery, nonbloody diarrhea.  Denies recent traveling, recent changes in her water sources, history of C. difficile.  She is on maintenance Keflex due to her history of frequent UTIs.  Denies weakness and fatigue.  Denies fever and chills.  Denies headache or neck pain.  Denies taking anything additional other than her albuterol inhaler for her symptoms.  Patient is concerned that she may have pneumonia.  Denies all other systemic symptoms.    ROS: Negative unless mentioned in HPI    Medical Hx: Allergies reviewed.  Immunizations up-to-date.    Physical exam:    Constitutional: Patient is well-nourished and well-developed.  Sitting comfortably in the room and in no distress.  Oriented to person, place, time, and situation.    HEENT: Head is normocephalic, atraumatic. Patient's airway is patent.  Tympanic membranes are clear bilaterally.  Nasal mucosa clear.  Mouth with normal mucosa.  Throat is not erythematous and there are no oropharyngeal exudates, uvula is midline.  No obvious facial deformities.    Eyes:  Clear bilaterally.  Pupils are equal round and reactive to light and accommodation.  Extraocular movements intact.      Cardiac: Regular rate, regular rhythm.  Heart sounds S1, S2.  No murmurs, rubs, or gallops.  PMI nondisplaced.  No JVD.    Respiratory: Regular respiratory rate and effort.  Breath sounds are clear and equal bilaterally, no adventitious lung sounds.  Patient is speaking in full sentences and is in no apparent respiratory distress. No use of accessory muscles.      Gastrointestinal: Abdomen is soft, nondistended, and nontender.  There are no obvious deformities.  No rebound tenderness or guarding.  Bowel sounds are normal active.    Genitourinary: No CVA or flank tenderness.    Musculoskeletal: No reproducible tenderness.  No obvious skin or bony deformities.  Patient has equal range of motion in all extremities and no strength deficiencies.  No muscle or joint tenderness. No back or neck tenderness.  Capillary refill less than 3 seconds.  Strong peripheral pulses.  No sensory deficits.    Neurological: Patient is alert and oriented.  No focal deficits.  5/5 strength in all extremities.  Cranial nerves II through XII intact. GCS15.     Skin: Skin is normal color for race and is warm, dry, and intact.  No evidence of trauma.  No lesions, rashes, bruising, jaundice, or masses.    Psych: Appropriate mood and affect.  No apparent risk to self or others.    Heme/lymph: No adenopathy, lymphadenopathy, or splenomegaly    Physical exam is otherwise negative unless stated above or in history of present illness.              Patient History   Past Medical History:   Diagnosis Date    Allergies     Anxiety     Arthritis     Diverticulosis     GERD (gastroesophageal reflux disease)     Hiatal hernia     Hyperlipidemia     Plantar fascial fibromatosis 10/06/2020    Plantar fasciitis of left foot    Urinary tract infection     Vertigo     Wears dentures     upper    Wears glasses      Past Surgical History:  "  Procedure Laterality Date    ABCESS DRAINAGE      perianal    ABSCESS DRAINAGE      \"skin abscess\"    CATARACT EXTRACTION      CHOLECYSTECTOMY      COLONOSCOPY      FOOT SURGERY      plantar fascitis    LAPAROSCOPY REPAIR HIATAL HERNIA      UPPER GASTROINTESTINAL ENDOSCOPY      US ASPIRATION INJECTION INTERMEDIATE JOINT  01/25/2022    US ASPIRATION INJECTION INTERMEDIATE JOINT 1/25/2022 [facility]     Family History   Problem Relation Name Age of Onset    Lung cancer Mother's Sister       Social History     Tobacco Use    Smoking status: Never    Smokeless tobacco: Never   Vaping Use    Vaping status: Never Used   Substance Use Topics    Alcohol use: Yes     Alcohol/week: 4.0 standard drinks of alcohol     Types: 2 Cans of beer, 2 Shots of liquor per week    Drug use: Never       Physical Exam   ED Triage Vitals [11/08/24 1051]   Temperature Heart Rate Respirations BP   36.8 °C (98.2 °F) 75 18 175/83      Pulse Ox Temp Source Heart Rate Source Patient Position   100 % Oral Monitor Sitting      BP Location FiO2 (%)     Right arm --       Physical Exam      ED Course & MDM   Diagnoses as of 11/08/24 1332   Viral bronchitis        Patient updated on plan for lab testing, IV insertion, radiology imaging, and medications to be administered while in the ER (if indicated). Patient updated on expected wait times for testing and results. Patient provided my name and told to ask any staff member for questions or concerns if they should arise. Electronic medical record reviewed.     MDM    Upon initial assessment, patient was healthy non-toxic appearing and in no apparent distress.     Patient presented to the emergency department with the chief complaint including nausea, cough, diarrhea.  Examination of the patient's heart and lungs is unremarkable.  Abdomen is soft, nontender, nondistended.  On arrival to the emergency department, vital signs were within normal limits    Based on the patient's history and " physical exam I have a low suspicion for PE, dissection, ACS, obstruction, intra-abdominal infection or obstruction.  Will obtain viral swabs and obtain a chest x-ray to evaluate for pneumonia.  Give the patient oral Zofran for her nausea.    Patient is feeling better after the Zofran.  Her swabs are negative and her chest x-ray is clear.  No sign of pneumonia.  Likely experiencing a persistent bronchitis.  Should be given Tylenol for any aches and pains and Zofran for nausea.  To be given Mucinex to help thin out her sputum and produce her sputum as well as intranasal Afrin for the congestion.  I did discuss risks associated with prolonged Afrin use.  She will also be started on prednisone and a Z-Ko given her persistent symptoms.  I do believe she benefit from the anti-inflammatory properties.  She will follow-up with her primary care provider.  Confirm that she can tolerate both Z-Ko and prednisone given her allergies.  All questions and concerns addressed.  Reasons to return to ER discussed.  Patient verbalized understanding and agreement with the treatment plan and they remained hemodynamically stable in the ER.    This note was dictated using a speech recognition program.  While an attempt was made at proof-reading to minimize errors, minor errors in transcription may be present         No data recorded                                 Medical Decision Making      Procedure  Procedures     Oleg Horner PA-C  11/08/24 [id number]

## 2024-11-12 ENCOUNTER — APPOINTMENT (OUTPATIENT)
Dept: UROLOGY | Facility: CLINIC | Age: 69
End: 2024-11-12
Payer: COMMERCIAL

## 2024-11-18 ENCOUNTER — APPOINTMENT (OUTPATIENT)
Dept: PRIMARY CARE | Facility: CLINIC | Age: 69
End: 2024-11-18
Payer: COMMERCIAL

## 2024-11-18 VITALS
HEART RATE: 68 BPM | BODY MASS INDEX: 21.63 KG/M2 | TEMPERATURE: 95.9 F | DIASTOLIC BLOOD PRESSURE: 66 MMHG | WEIGHT: 126 LBS | SYSTOLIC BLOOD PRESSURE: 124 MMHG

## 2024-11-18 DIAGNOSIS — J20.8 ACUTE BRONCHITIS DUE TO OTHER SPECIFIED ORGANISMS: Primary | ICD-10-CM

## 2024-11-18 DIAGNOSIS — E03.9 HYPOTHYROIDISM, UNSPECIFIED TYPE: ICD-10-CM

## 2024-11-18 DIAGNOSIS — R53.83 FATIGUE, UNSPECIFIED TYPE: ICD-10-CM

## 2024-11-18 PROCEDURE — 99213 OFFICE O/P EST LOW 20 MIN: CPT | Performed by: INTERNAL MEDICINE

## 2024-11-18 PROCEDURE — 1123F ACP DISCUSS/DSCN MKR DOCD: CPT | Performed by: INTERNAL MEDICINE

## 2024-11-18 PROCEDURE — 1159F MED LIST DOCD IN RCRD: CPT | Performed by: INTERNAL MEDICINE

## 2024-11-18 PROCEDURE — 1036F TOBACCO NON-USER: CPT | Performed by: INTERNAL MEDICINE

## 2024-11-18 RX ORDER — LEVOTHYROXINE SODIUM 25 UG/1
25 TABLET ORAL DAILY
Qty: 30 TABLET | Refills: 3 | Status: SHIPPED | OUTPATIENT
Start: 2024-11-18 | End: 2025-11-18

## 2024-11-18 ASSESSMENT — PATIENT HEALTH QUESTIONNAIRE - PHQ9
1. LITTLE INTEREST OR PLEASURE IN DOING THINGS: NOT AT ALL
2. FEELING DOWN, DEPRESSED OR HOPELESS: NOT AT ALL
SUM OF ALL RESPONSES TO PHQ9 QUESTIONS 1 & 2: 0

## 2024-11-18 NOTE — PROGRESS NOTES
Subjective   Patient ID: Kaelyn Mohamud is a 69 y.o. female who presents for Follow-up (Pt here for visit  follow up ER  viral bronchitis  given zpak, mucines and sterids  all finished   Still fatigued had labs done).    HPI patient here for ER follow-up she was seen in ER for acute bronchitis she was worried about walking pneumonia chest x-ray was negative.  She was given Z-Ko and prednisone.  She still is feeling very tired and lack of energy.    Review of Systems  Feels exhausted.  She had recent bronchitis.  She is allergic to cats but does have cat as a pet.  All other 12 review of system negative.    Objective   /66   Pulse 68   Temp 35.5 °C (95.9 °F) (Temporal)   Wt 57.2 kg (126 lb)   BMI 21.63 kg/m²     Physical Exam  Vitals reviewed.   Constitutional:       Appearance: Normal appearance.   Eyes:      Pupils: Pupils are equal, round, and reactive to light.   Cardiovascular:      Rate and Rhythm: Normal rate and regular rhythm.   Pulmonary:      Effort: Pulmonary effort is normal.      Breath sounds: Normal breath sounds.   Musculoskeletal:      Right lower leg: No edema.      Left lower leg: No edema.   Lymphadenopathy:      Cervical: No cervical adenopathy.   Neurological:      General: No focal deficit present.      Mental Status: She is alert. Mental status is at baseline.   Psychiatric:         Mood and Affect: Mood normal.         Behavior: Behavior normal.         Thought Content: Thought content normal.         Assessment/Plan   Problem List Items Addressed This Visit             ICD-10-CM    Fatigue R53.83    Acute bronchitis due to other specified organisms - Primary J20.8     Other Visit Diagnoses         Codes    Hypothyroidism, unspecified type     E03.9    Relevant Medications    levothyroxine (Synthroid, Levoxyl) 25 mcg tablet        Patient has recovered from acute bronchitis.  Her thyroid functions indicate that she is slightly hypothyroid we will put her on levothyroxine.  She  does have still issues with fatigue.  She is allergic to cats but that does have a cat as a pet.  She is done with steroids and antibiotics.

## 2024-11-29 DIAGNOSIS — F41.9 ANXIETY: ICD-10-CM

## 2024-12-02 RX ORDER — SERTRALINE HYDROCHLORIDE 50 MG/1
TABLET, FILM COATED ORAL
Qty: 90 TABLET | Refills: 0 | Status: SHIPPED | OUTPATIENT
Start: 2024-12-02

## 2024-12-03 ENCOUNTER — APPOINTMENT (OUTPATIENT)
Dept: UROLOGY | Facility: CLINIC | Age: 69
End: 2024-12-03
Payer: COMMERCIAL

## 2024-12-20 ENCOUNTER — APPOINTMENT (OUTPATIENT)
Dept: RADIOLOGY | Facility: HOSPITAL | Age: 69
End: 2024-12-20
Payer: COMMERCIAL

## 2024-12-20 ENCOUNTER — HOSPITAL ENCOUNTER (EMERGENCY)
Facility: HOSPITAL | Age: 69
Discharge: HOME | End: 2024-12-20
Payer: COMMERCIAL

## 2024-12-20 VITALS
OXYGEN SATURATION: 100 % | DIASTOLIC BLOOD PRESSURE: 78 MMHG | BODY MASS INDEX: 21.51 KG/M2 | TEMPERATURE: 97.3 F | WEIGHT: 126 LBS | SYSTOLIC BLOOD PRESSURE: 171 MMHG | RESPIRATION RATE: 19 BRPM | HEART RATE: 83 BPM | HEIGHT: 64 IN

## 2024-12-20 DIAGNOSIS — K57.92 ACUTE DIVERTICULITIS: Primary | ICD-10-CM

## 2024-12-20 LAB
ALBUMIN SERPL BCP-MCNC: 4.8 G/DL (ref 3.4–5)
ALP SERPL-CCNC: 70 U/L (ref 33–136)
ALT SERPL W P-5'-P-CCNC: 15 U/L (ref 7–45)
ANION GAP SERPL CALC-SCNC: 13 MMOL/L (ref 10–20)
APPEARANCE UR: CLEAR
AST SERPL W P-5'-P-CCNC: 21 U/L (ref 9–39)
BASOPHILS # BLD AUTO: 0.05 X10*3/UL (ref 0–0.1)
BASOPHILS NFR BLD AUTO: 1.2 %
BILIRUB SERPL-MCNC: 0.5 MG/DL (ref 0–1.2)
BILIRUB UR STRIP.AUTO-MCNC: NEGATIVE MG/DL
BNP SERPL-MCNC: 18 PG/ML (ref 0–99)
BUN SERPL-MCNC: 12 MG/DL (ref 6–23)
CALCIUM SERPL-MCNC: 9.7 MG/DL (ref 8.6–10.3)
CARDIAC TROPONIN I PNL SERPL HS: <3 NG/L (ref 0–13)
CHLORIDE SERPL-SCNC: 101 MMOL/L (ref 98–107)
CO2 SERPL-SCNC: 27 MMOL/L (ref 21–32)
COLOR UR: NORMAL
CREAT SERPL-MCNC: 0.69 MG/DL (ref 0.5–1.05)
EGFRCR SERPLBLD CKD-EPI 2021: >90 ML/MIN/1.73M*2
EOSINOPHIL # BLD AUTO: 0.06 X10*3/UL (ref 0–0.7)
EOSINOPHIL NFR BLD AUTO: 1.4 %
ERYTHROCYTE [DISTWIDTH] IN BLOOD BY AUTOMATED COUNT: 13.9 % (ref 11.5–14.5)
FLUAV RNA RESP QL NAA+PROBE: NOT DETECTED
FLUBV RNA RESP QL NAA+PROBE: NOT DETECTED
GLUCOSE SERPL-MCNC: 85 MG/DL (ref 74–99)
GLUCOSE UR STRIP.AUTO-MCNC: NORMAL MG/DL
HCT VFR BLD AUTO: 41 % (ref 36–46)
HGB BLD-MCNC: 13.6 G/DL (ref 12–16)
IMM GRANULOCYTES # BLD AUTO: 0.01 X10*3/UL (ref 0–0.7)
IMM GRANULOCYTES NFR BLD AUTO: 0.2 % (ref 0–0.9)
INR PPP: 0.9 (ref 0.9–1.1)
KETONES UR STRIP.AUTO-MCNC: NEGATIVE MG/DL
LACTATE SERPL-SCNC: 0.8 MMOL/L (ref 0.4–2)
LEUKOCYTE ESTERASE UR QL STRIP.AUTO: NEGATIVE
LIPASE SERPL-CCNC: 42 U/L (ref 9–82)
LYMPHOCYTES # BLD AUTO: 0.98 X10*3/UL (ref 1.2–4.8)
LYMPHOCYTES NFR BLD AUTO: 23.7 %
MCH RBC QN AUTO: 29.2 PG (ref 26–34)
MCHC RBC AUTO-ENTMCNC: 33.2 G/DL (ref 32–36)
MCV RBC AUTO: 88 FL (ref 80–100)
MONOCYTES # BLD AUTO: 0.39 X10*3/UL (ref 0.1–1)
MONOCYTES NFR BLD AUTO: 9.4 %
NEUTROPHILS # BLD AUTO: 2.65 X10*3/UL (ref 1.2–7.7)
NEUTROPHILS NFR BLD AUTO: 64.1 %
NITRITE UR QL STRIP.AUTO: NEGATIVE
NRBC BLD-RTO: 0 /100 WBCS (ref 0–0)
PH UR STRIP.AUTO: 7 [PH]
PLATELET # BLD AUTO: 440 X10*3/UL (ref 150–450)
POTASSIUM SERPL-SCNC: 3.9 MMOL/L (ref 3.5–5.3)
PROT SERPL-MCNC: 7.9 G/DL (ref 6.4–8.2)
PROT UR STRIP.AUTO-MCNC: NEGATIVE MG/DL
PROTHROMBIN TIME: 10.3 SECONDS (ref 9.8–12.8)
RBC # BLD AUTO: 4.65 X10*6/UL (ref 4–5.2)
RBC # UR STRIP.AUTO: NEGATIVE /UL
SARS-COV-2 RNA RESP QL NAA+PROBE: NOT DETECTED
SODIUM SERPL-SCNC: 137 MMOL/L (ref 136–145)
SP GR UR STRIP.AUTO: 1.01
UROBILINOGEN UR STRIP.AUTO-MCNC: NORMAL MG/DL
WBC # BLD AUTO: 4.1 X10*3/UL (ref 4.4–11.3)

## 2024-12-20 PROCEDURE — 83690 ASSAY OF LIPASE: CPT | Performed by: PHYSICIAN ASSISTANT

## 2024-12-20 PROCEDURE — 99285 EMERGENCY DEPT VISIT HI MDM: CPT | Mod: 25

## 2024-12-20 PROCEDURE — 85610 PROTHROMBIN TIME: CPT | Performed by: PHYSICIAN ASSISTANT

## 2024-12-20 PROCEDURE — 83605 ASSAY OF LACTIC ACID: CPT | Performed by: PHYSICIAN ASSISTANT

## 2024-12-20 PROCEDURE — 83880 ASSAY OF NATRIURETIC PEPTIDE: CPT | Performed by: PHYSICIAN ASSISTANT

## 2024-12-20 PROCEDURE — 96374 THER/PROPH/DIAG INJ IV PUSH: CPT

## 2024-12-20 PROCEDURE — 81003 URINALYSIS AUTO W/O SCOPE: CPT | Performed by: PHYSICIAN ASSISTANT

## 2024-12-20 PROCEDURE — 84484 ASSAY OF TROPONIN QUANT: CPT | Performed by: PHYSICIAN ASSISTANT

## 2024-12-20 PROCEDURE — 74176 CT ABD & PELVIS W/O CONTRAST: CPT

## 2024-12-20 PROCEDURE — 87636 SARSCOV2 & INF A&B AMP PRB: CPT | Performed by: PHYSICIAN ASSISTANT

## 2024-12-20 PROCEDURE — 96375 TX/PRO/DX INJ NEW DRUG ADDON: CPT

## 2024-12-20 PROCEDURE — 74176 CT ABD & PELVIS W/O CONTRAST: CPT | Performed by: RADIOLOGY

## 2024-12-20 PROCEDURE — 80053 COMPREHEN METABOLIC PANEL: CPT | Performed by: PHYSICIAN ASSISTANT

## 2024-12-20 PROCEDURE — 96376 TX/PRO/DX INJ SAME DRUG ADON: CPT

## 2024-12-20 PROCEDURE — 85025 COMPLETE CBC W/AUTO DIFF WBC: CPT | Performed by: PHYSICIAN ASSISTANT

## 2024-12-20 PROCEDURE — 71045 X-RAY EXAM CHEST 1 VIEW: CPT | Performed by: RADIOLOGY

## 2024-12-20 PROCEDURE — 2500000004 HC RX 250 GENERAL PHARMACY W/ HCPCS (ALT 636 FOR OP/ED): Performed by: PHYSICIAN ASSISTANT

## 2024-12-20 PROCEDURE — 36415 COLL VENOUS BLD VENIPUNCTURE: CPT | Performed by: PHYSICIAN ASSISTANT

## 2024-12-20 PROCEDURE — 71045 X-RAY EXAM CHEST 1 VIEW: CPT

## 2024-12-20 RX ORDER — HYDROCODONE BITARTRATE AND ACETAMINOPHEN 5; 325 MG/1; MG/1
1 TABLET ORAL EVERY 6 HOURS PRN
Qty: 12 TABLET | Refills: 0 | Status: SHIPPED | OUTPATIENT
Start: 2024-12-20 | End: 2024-12-23

## 2024-12-20 RX ORDER — AMOXICILLIN AND CLAVULANATE POTASSIUM 875; 125 MG/1; MG/1
1 TABLET, FILM COATED ORAL 2 TIMES DAILY
Qty: 20 TABLET | Refills: 0 | Status: SHIPPED | OUTPATIENT
Start: 2024-12-20 | End: 2024-12-30

## 2024-12-20 RX ORDER — NALOXONE HYDROCHLORIDE 4 MG/.1ML
4 SPRAY NASAL AS NEEDED
Qty: 2 EACH | Refills: 0 | Status: SHIPPED | OUTPATIENT
Start: 2024-12-20

## 2024-12-20 RX ORDER — MORPHINE SULFATE 4 MG/ML
4 INJECTION, SOLUTION INTRAMUSCULAR; INTRAVENOUS ONCE
Status: COMPLETED | OUTPATIENT
Start: 2024-12-20 | End: 2024-12-20

## 2024-12-20 RX ORDER — KETOROLAC TROMETHAMINE 30 MG/ML
15 INJECTION, SOLUTION INTRAMUSCULAR; INTRAVENOUS ONCE
Status: COMPLETED | OUTPATIENT
Start: 2024-12-20 | End: 2024-12-20

## 2024-12-20 RX ORDER — ONDANSETRON HYDROCHLORIDE 2 MG/ML
4 INJECTION, SOLUTION INTRAVENOUS ONCE
Status: COMPLETED | OUTPATIENT
Start: 2024-12-20 | End: 2024-12-20

## 2024-12-20 RX ADMIN — MORPHINE SULFATE 4 MG: 4 INJECTION, SOLUTION INTRAMUSCULAR; INTRAVENOUS at 14:43

## 2024-12-20 RX ADMIN — MORPHINE SULFATE 4 MG: 4 INJECTION, SOLUTION INTRAMUSCULAR; INTRAVENOUS at 13:28

## 2024-12-20 RX ADMIN — ONDANSETRON 4 MG: 2 INJECTION INTRAMUSCULAR; INTRAVENOUS at 13:26

## 2024-12-20 RX ADMIN — KETOROLAC TROMETHAMINE 15 MG: 30 INJECTION, SOLUTION INTRAMUSCULAR at 13:27

## 2024-12-20 ASSESSMENT — COLUMBIA-SUICIDE SEVERITY RATING SCALE - C-SSRS
2. HAVE YOU ACTUALLY HAD ANY THOUGHTS OF KILLING YOURSELF?: NO
1. IN THE PAST MONTH, HAVE YOU WISHED YOU WERE DEAD OR WISHED YOU COULD GO TO SLEEP AND NOT WAKE UP?: NO
6. HAVE YOU EVER DONE ANYTHING, STARTED TO DO ANYTHING, OR PREPARED TO DO ANYTHING TO END YOUR LIFE?: NO

## 2024-12-20 ASSESSMENT — PAIN SCALES - GENERAL
PAINLEVEL_OUTOF10: 8
PAINLEVEL_OUTOF10: 8

## 2024-12-20 ASSESSMENT — PAIN DESCRIPTION - LOCATION: LOCATION: ABDOMEN

## 2024-12-20 ASSESSMENT — PAIN DESCRIPTION - PAIN TYPE: TYPE: ACUTE PAIN

## 2024-12-20 ASSESSMENT — PAIN - FUNCTIONAL ASSESSMENT: PAIN_FUNCTIONAL_ASSESSMENT: 0-10

## 2024-12-20 NOTE — PROGRESS NOTES
I called and spoke with patient.  She went to the ER due to abdominal pain.  CT scan shows thickening and fat stranding around the sigmoid colon consistent with diverticulitis.  She was discharged on Augmentin for 10 days.  She will stay on a low fiber diet for 1 week and thereafter go on a high-fiber diet.  Continue the Metamucil.  Follow-up in 2 weeks

## 2024-12-20 NOTE — Clinical Note
Kaelyn Mohamud was seen and treated in our emergency department on 12/20/2024.  She may return to work on 12/22/2024.       If you have any questions or concerns, please don't hesitate to call.      Israel Robertson PA-C

## 2024-12-21 LAB — HOLD SPECIMEN: NORMAL

## 2025-01-04 ENCOUNTER — APPOINTMENT (OUTPATIENT)
Dept: UROLOGY | Facility: CLINIC | Age: 70
End: 2025-01-04
Payer: COMMERCIAL

## 2025-01-04 VITALS
SYSTOLIC BLOOD PRESSURE: 146 MMHG | BODY MASS INDEX: 22.02 KG/M2 | DIASTOLIC BLOOD PRESSURE: 88 MMHG | HEART RATE: 90 BPM | RESPIRATION RATE: 16 BRPM | WEIGHT: 128.31 LBS

## 2025-01-04 DIAGNOSIS — N39.0 URINARY TRACT INFECTION WITHOUT HEMATURIA, SITE UNSPECIFIED: ICD-10-CM

## 2025-01-04 DIAGNOSIS — N35.12 POSTINFECTIVE URETHRAL STRICTURE IN FEMALE: ICD-10-CM

## 2025-01-04 DIAGNOSIS — N32.89 BLADDER SPASMS: Primary | ICD-10-CM

## 2025-01-04 LAB
POC APPEARANCE, URINE: CLEAR
POC BILIRUBIN, URINE: NEGATIVE
POC BLOOD, URINE: NEGATIVE
POC COLOR, URINE: YELLOW
POC GLUCOSE, URINE: NEGATIVE MG/DL
POC KETONES, URINE: NEGATIVE MG/DL
POC LEUKOCYTES, URINE: NEGATIVE
POC NITRITE,URINE: NEGATIVE
POC PH, URINE: 6 PH
POC PROTEIN, URINE: NEGATIVE MG/DL
POC SPECIFIC GRAVITY, URINE: 1.01
POC UROBILINOGEN, URINE: 0.2 EU/DL

## 2025-01-04 NOTE — LETTER
January 4, 2025     Natalie Lombardo MD  [address] [address]  [facility] [address]    Patient: Kaelyn Mohamud   YOB: 1955   Date of Visit: 1/4/2025       Dear Dr. Natalie Lombardo MD:    Thank you for referring Kaelyn Mohamud to me for evaluation. Below are my notes for this consultation.  If you have questions, please do not hesitate to call me. I look forward to following your patient along with you.       Sincerely,     Dale Wagoner MD      CC: No Recipients  ______________________________________________________________________________________        Provider Impressions     69 year-old white female self-referred, previous patient of Dr. Oliver Smallwood, for RECURRENT UTI AND BLADDER SPASMS. Urinalysis is negative for blood. CAT scan of the chest, abdomen and pelvis is negative. Renal bladder ultrasound identifies a 3 mm right RENAL CALCULUS. Urine culture was negative but then a second urine culture with was positive for Klebsiella, UTI. Patient retired in 2018, previously employed in [employer]. No family history of breast or prostate cancer. The patient has never smoked cigarettes.      MACROBID  allergy     April 17, 2023, patient arrives alone. She states she has had Recurrent UTIs with Urgency and Frequency. She states that she has had URETHRAL STRICTURES. We discussed the etiology of recurrent UTIs and poor bladder emptying. She still has her uterus and ovaries. She is sexually active. She takes showers, not tub baths. She does not use a swimming pool, hot tub or bicycle. She has been diagnosed with a URETHRAL STRICTURE in the past. We will initiate cystoscopy with urethral dilatation, flow studies and a discussion of treatment options. She may require an alpha agent. Urine culture was negative as well as urine cytology     May 19, 2023, CYSTOSCOPY WITH URETHRAL DILATATION a 20 [place]er, flow studies and a discussion of treatment options. DENSE URETHRAL STRICTURE. Severe  erythema in the lower half of the bladder. We will increase to 22 Bhutanese and 2 months. Flow rate 9 cc/s PVR 0 cc. Urinalysis and urine culture were negative. We will not add an alpha agent for antibiotic at this time.     August 9, 2023, cystoscopy with urethral dilatation to 20 Bhutanese. Attempts to 22 were unsuccessful due to pain. However the patient had a recent UTI and we will attempt that again at her next visit. Flow rate 2 cc/s, total volume 17 cc, PVR 0 cc. She states that after her first dilatation she noticed a dramatic improvement in flow and emptying. We will try to continue to increase the size and strongly consider a low-dose daily cephalosporin if necessary.     August 26, 2023, cystoscopy with urethral dilatation to 22 Bhutanese of a dense urethral stricture upward sloping. Moderate discomfort with no bleeding. Significant erythema in the bladder base and trigone with a very clear line of demarcation. No tumors or stones. Flow rate 2 cc/s, total volume 19 cc, PVR 0 cc. Urinalysis is negative for blood and urine culture no growth. She will return in 4 weeks and we will increase the size to 24 Bhutanese at patient's request.     September 16, 2023, telephone call, urine culture was positive for E. coli treated with Bactrim     September 23, 2023, cystoscopy with dilatation of a dense urethral stricture to 24 Bhutanese with moderate bleeding and moderate pain. Erythema confined to the bladder base and trigone. We will not increase the size as this appears to be her limit. . I have added Bactrim single strength daily at this time.. She will return in 4 weeks and then we will increase times weekly     December 16, 2023, cystoscopy with dilatation of a dense urethral stricture to 24 Bhutanese with minimal bleeding and moderate pain. Erythema confined to the trigone. We will not increase the size as this appears to be her limit. . I have added Bactrim single strength daily at this time. She will return in 5 weeks and  "then we will increase times weekly     January 20, 2024, telephone call, urine culture is positive for E. coli.  Keflex ordered.  Resistant to Bactrim.     January 23, 2024, cystoscopy with dilatation of a dense urethral stricture to 24 Croatian with minimal pain and minimal bleeding.  Once again erythema confined to the trigone.  24 Croatian is the patient's limit.  We will change her daily Bactrim to Keflex as it was resistant to her most recent E. coli infection.  We will expand to 6-week regimen.     March 5, 2024, cystoscopy with dilatation of a dense upward sloping urethral stricture to 24 Croatian with moderate discomfort.  Erythema confined to the bladder base and trigone.  The patient cannot tolerate dilation larger than 24 Croatian.  She maintains her daily Keflex.  We will expand to 7 weeks.     April 23, 2024, cystoscopy with dilatation of a dense upward sloping urethral stricture to 24 Croatian with minimal discomfort.  Erythema again in the bladder base and trigone.  This patient cannot tolerate dilatation larger than the 24 Croatian size.  We will transition her from daily Keflex to twice a week Keflex at the 500 mg dose.  We will expand her regimen to 8 weeks.  She wishes to continue as she has less frequency, less urgency, and less nocturia.  Renal colic CAT scan does not identify any stones in the kidneys or ureters.  Urine culture no growth.  Urinalysis was negative for blood.     August 16, 2024, patient arrives alone.  As we have discussed before, the patient was in control regarding continuation of dilatation.  She felt that she no longer had frequency, urgency or nocturia and discontinued plans for any further urethral dilatation.  She returns today complaining of urinary frequency, urinary urgency, and that her urine is \"blocked\" when she is trying to urinate.  She states that she is now straining to urinate and wishes to reinitiate a schedule once again.  She continues on Keflex twice a week 500 mg.   "   September 9, 2024, cystoscopy with urethral dilatation to 24 Martiniquais with moderate pain and no bleeding.  Erythema posteriorly.  Dense upward sloping urethral stricture.  She would like to start her regimen again and will return in 4 weeks.  PVR of 83 cc.  She continues on Keflex 500 mg twice a week.  Urinalysis was negative for blood and urine culture no growth.     October 9, 2024, cystoscopy with urethral dilatation to 24 Martiniquais with minimal pain and no bleeding.  Mild erythema in the bladder base.  Dense upward sloping urethral stricture.  No further straining for urination, no further urinary frequency and no further postvoid dribbling.  PVR of 16 cc.  We will expand her regimen to 5 weeks.  Urinalysis was negative for blood and urine culture no growth.  Continues on Keflex 500 mg twice a week.    January 4, 2025, cystoscopy with urethral dilatation of a dense upward sloping urethral stricture to 24 Martiniquais with minimal discomfort and no bleeding.  Erythema is significant posteriorly.  Patient is quite happy with her regimen and no longer experiences straining with urination, urinary frequency or postvoid dribbling.  PVR of 18 cc.  She continues on Keflex 500 mg twice a week.  Urinalysis was negative for blood and urine culture no growth.  She was delayed for this procedure due to a bout of diverticulitis which resulted in a bowel perforation.  She will return in 6 weeks.     PLAN:     1. The patient will return in 6 weeks  INCREASE TIME PERIODS WEEKLY. for cystoscopy with urethral dilatation to 24 Martiniquais, PVR and a URINALYSIS AND URINE CULTURE. She will receive PROPHYLAXIS WITH KEFLEX 250 mg p.o. every 12 hours for 4 doses. We will HOLD the size at 24 Martiniquais       #2  Keflex 500 mg p.o. q. Wednesday and q. Sunday     3.  In April 2025, cystoscopy with dilatation to 24 [place]iqu[place], PVR, urinalysis, urine culture, renal colic CAT scan     Physical Exam  Vitals and nursing note reviewed. Exam conducted with a  chaperone present.   Constitutional:       Appearance: Normal appearance.   HENT:      Head: Normocephalic and atraumatic.   Pulmonary:      Effort: Pulmonary effort is normal.   Abdominal:      Palpations: Abdomen is soft.      Tenderness: There is no abdominal tenderness.   Genitourinary:     General: Normal vulva.      Vagina: No vaginal discharge.   Musculoskeletal:         General: Normal range of motion.      Cervical back: Normal range of motion and neck supple.   Neurological:      General: No focal deficit present.      Mental Status: She is alert and oriented to person, place, and time.   Psychiatric:         Mood and Affect: Mood normal.         Behavior: Behavior normal.        This note was created with voice-recognition software and was not corrected for typographical or grammatical errors

## 2025-01-04 NOTE — PATIENT INSTRUCTIONS
Patient Discussion/Summary     It was very nice to see you again today. We had a long discussion regarding recurrent urinary tract infections and urethral strictures. Today you were dilated to 24 Iranian with moderate discomfort.  We will restart the regimen and I will see you again in 6 weeks.      This note was created with voice-recognition software and was not corrected for typographical or grammatical errors

## 2025-01-04 NOTE — PROGRESS NOTES
"Patient ID: Kaelyn Mohamud is a 69 y.o. female.    Procedures  Pt took Keflex 250mg po as prescribed  Anesthesia: Local 2% Lidocaine  Instruments: 6F flexible disposable Cystoscope     Pt brought to procedure room and placed in dorsal lithotomy position. Pt draped and prepped in normal sterile fashion. 5ml lidocaine instilled into urethral meatus and 5ml instilled into urethra (urethra). Pt tolerated well.     I was present as chaperone for the entirety of the procedure   Eliza Iraheta  Cystoscopy performed by Dr. Dale Wagoner        Bedside \"Time Out\" Verification   Today's Date: 1/4/2025 I attest that this time out verification took place prior to the procedure.   Procedure: Cysto   RN/LPN/MA: KAREN   Provider: WAL.   Verified By: MA and Provider, Dr. Dale Wagoner.   Prior to the start of the procedure a time out was taken and the following were verified: the identity of the patient using two patient identifiers, the correct procedure, the correct site marked as indicated, the correct positioning for the patient and the correct equipment was obtained.   Cystoscopy - Kaelyn Mohamud   identified using two (2) forms of identification.   Procedure: diagnostic Cystourethroscopy   Procedure Note: Time Started: 1:00pm  Time Completed: 1:53 PM  Indications for procedure: Cysto/Dil 24F- Urethral Stricture      Discussed with patient: Risks, benefits, and alternative were discussed in detail. Patient appears to understand and agrees to proceed. Patient has signed the procedure consent form.    CYSTOSCOPY:    Cystoscopy today reveals a dense upward sloping urethral stricture dilated to 24 Azeri with minimal discomfort.  Significant erythema is identified posteriorly.  PVR 18 cc.  "

## 2025-01-04 NOTE — PROGRESS NOTES
Provider Impressions     69 year-old white female self-referred, previous patient of Dr. Oliver Smallwood, for RECURRENT UTI AND BLADDER SPASMS. Urinalysis is negative for blood. CAT scan of the chest, abdomen and pelvis is negative. Renal bladder ultrasound identifies a 3 mm right RENAL CALCULUS. Urine culture was negative but then a second urine culture with was positive for Klebsiella, UTI. Patient retired in 2018, previously employed in [employer]. No family history of breast or prostate cancer. The patient has never smoked cigarettes.      MACROBID  allergy     April 17, 2023, patient arrives alone. She states she has had Recurrent UTIs with Urgency and Frequency. She states that she has had URETHRAL STRICTURES. We discussed the etiology of recurrent UTIs and poor bladder emptying. She still has her uterus and ovaries. She is sexually active. She takes showers, not tub baths. She does not use a swimming pool, hot tub or bicycle. She has been diagnosed with a URETHRAL STRICTURE in the past. We will initiate cystoscopy with urethral dilatation, flow studies and a discussion of treatment options. She may require an alpha agent. Urine culture was negative as well as urine cytology     May 19, 2023, CYSTOSCOPY WITH URETHRAL DILATATION a 20 [place] Korean, flow studies and a discussion of treatment options. DENSE URETHRAL STRICTURE. Severe erythema in the lower half of the bladder. We will increase to 22 [place] Korean and 2 months. Flow rate 9 cc/s PVR 0 cc. Urinalysis and urine culture were negative. We will not add an alpha agent for antibiotic at this time.     August 9, 2023, cystoscopy with urethral dilatation to 20 [place] Korean. Attempts to 22 were unsuccessful due to pain. However the patient had a recent UTI and we will attempt that again at her next visit. Flow rate 2 cc/s, total volume 17 cc, PVR 0 cc. She states that after her first dilatation she noticed a dramatic improvement in flow and emptying. We will try to  continue to increase the size and strongly consider a low-dose daily cephalosporin if necessary.     August 26, 2023, cystoscopy with urethral dilatation to 22 Dominican of a dense urethral stricture upward sloping. Moderate discomfort with no bleeding. Significant erythema in the bladder base and trigone with a very clear line of demarcation. No tumors or stones. Flow rate 2 cc/s, total volume 19 cc, PVR 0 cc. Urinalysis is negative for blood and urine culture no growth. She will return in 4 weeks and we will increase the size to 24 Dominican at patient's request.     September 16, 2023, telephone call, urine culture was positive for E. coli treated with Bactrim     September 23, 2023, cystoscopy with dilatation of a dense urethral stricture to 24 Dominican with moderate bleeding and moderate pain. Erythema confined to the bladder base and trigone. We will not increase the size as this appears to be her limit. . I have added Bactrim single strength daily at this time.. She will return in 4 weeks and then we will increase times weekly     December 16, 2023, cystoscopy with dilatation of a dense urethral stricture to 24 Dominican with minimal bleeding and moderate pain. Erythema confined to the trigone. We will not increase the size as this appears to be her limit. . I have added Bactrim single strength daily at this time. She will return in 5 weeks and then we will increase times weekly     January 20, 2024, telephone call, urine culture is positive for E. coli.  Keflex ordered.  Resistant to Bactrim.     January 23, 2024, cystoscopy with dilatation of a dense urethral stricture to 24 Dominican with minimal pain and minimal bleeding.  Once again erythema confined to the trigone.  24 Dominican is the patient's limit.  We will change her daily Bactrim to Keflex as it was resistant to her most recent E. coli infection.  We will expand to 6-week regimen.     March 5, 2024, cystoscopy with dilatation of a dense upward sloping urethral  "stricture to 24 Burundian with moderate discomfort.  Erythema confined to the bladder base and trigone.  The patient cannot tolerate dilation larger than 24 Burundian.  She maintains her daily Keflex.  We will expand to 7 weeks.     April 23, 2024, cystoscopy with dilatation of a dense upward sloping urethral stricture to 24 Burundian with minimal discomfort.  Erythema again in the bladder base and trigone.  This patient cannot tolerate dilatation larger than the 24 Burundian size.  We will transition her from daily Keflex to twice a week Keflex at the 500 mg dose.  We will expand her regimen to 8 weeks.  She wishes to continue as she has less frequency, less urgency, and less nocturia.  Renal colic CAT scan does not identify any stones in the kidneys or ureters.  Urine culture no growth.  Urinalysis was negative for blood.     August 16, 2024, patient arrives alone.  As we have discussed before, the patient was in control regarding continuation of dilatation.  She felt that she no longer had frequency, urgency or nocturia and discontinued plans for any further urethral dilatation.  She returns today complaining of urinary frequency, urinary urgency, and that her urine is \"blocked\" when she is trying to urinate.  She states that she is now straining to urinate and wishes to reinitiate a schedule once again.  She continues on Keflex twice a week 500 mg.     September 9, 2024, cystoscopy with urethral dilatation to 24 Burundian with moderate pain and no bleeding.  Erythema posteriorly.  Dense upward sloping urethral stricture.  She would like to start her regimen again and will return in 4 weeks.  PVR of 83 cc.  She continues on Keflex 500 mg twice a week.  Urinalysis was negative for blood and urine culture no growth.     October 9, 2024, cystoscopy with urethral dilatation to 24 Burundian with minimal pain and no bleeding.  Mild erythema in the bladder base.  Dense upward sloping urethral stricture.  No further straining for " urination, no further urinary frequency and no further postvoid dribbling.  PVR of 16 cc.  We will expand her regimen to 5 weeks.  Urinalysis was negative for blood and urine culture no growth.  Continues on Keflex 500 mg twice a week.    January 4, 2025, cystoscopy with urethral dilatation of a dense upward sloping urethral stricture to 24 Citizen of [place] with minimal discomfort and no bleeding.  Erythema is significant posteriorly.  Patient is quite happy with her regimen and no longer experiences straining with urination, urinary frequency or postvoid dribbling.  PVR of 18 cc.  She continues on Keflex 500 mg twice a week.  Urinalysis was negative for blood and urine culture no growth.  She was delayed for this procedure due to a bout of diverticulitis which resulted in a bowel perforation.  She will return in 6 weeks.     PLAN:     1. The patient will return in 6 weeks  INCREASE TIME PERIODS WEEKLY. for cystoscopy with urethral dilatation to 24 Citizen of [place], PVR and a URINALYSIS AND URINE CULTURE. She will receive PROPHYLAXIS WITH KEFLEX 250 mg p.o. every 12 hours for 4 doses. We will HOLD the size at 24 Citizen of [place]       #2  Keflex 500 mg p.o. q. Wednesday and q. Sunday     3.  In April 2025, cystoscopy with dilatation to 24 Citizen of [place], PVR, urinalysis, urine culture, renal colic CAT scan     Physical Exam  Vitals and nursing note reviewed. Exam conducted with a chaperone present.   Constitutional:       Appearance: Normal appearance.   HENT:      Head: Normocephalic and atraumatic.   Pulmonary:      Effort: Pulmonary effort is normal.   Abdominal:      Palpations: Abdomen is soft.      Tenderness: There is no abdominal tenderness.   Genitourinary:     General: Normal vulva.      Vagina: No vaginal discharge.   Musculoskeletal:         General: Normal range of motion.      Cervical back: Normal range of motion and neck supple.   Neurological:      General: No focal deficit present.      Mental Status: She is alert and oriented to  person, place, and time.   Psychiatric:         Mood and Affect: Mood normal.         Behavior: Behavior normal.        This note was created with voice-recognition software and was not corrected for typographical or grammatical errors

## 2025-01-09 ENCOUNTER — APPOINTMENT (OUTPATIENT)
Dept: SURGERY | Facility: CLINIC | Age: 70
End: 2025-01-09
Payer: COMMERCIAL

## 2025-01-09 DIAGNOSIS — K57.32 DIVERTICULITIS OF LARGE INTESTINE WITHOUT PERFORATION OR ABSCESS WITHOUT BLEEDING: Primary | ICD-10-CM

## 2025-01-09 DIAGNOSIS — K52.9 CHRONIC DIARRHEA OF UNKNOWN ORIGIN: ICD-10-CM

## 2025-01-09 PROCEDURE — 99214 OFFICE O/P EST MOD 30 MIN: CPT | Performed by: SURGERY

## 2025-01-09 PROCEDURE — 1123F ACP DISCUSS/DSCN MKR DOCD: CPT | Performed by: SURGERY

## 2025-01-09 PROCEDURE — 1160F RVW MEDS BY RX/DR IN RCRD: CPT | Performed by: SURGERY

## 2025-01-09 PROCEDURE — 1159F MED LIST DOCD IN RCRD: CPT | Performed by: SURGERY

## 2025-01-09 NOTE — PROGRESS NOTES
Subjective   Patient ID: Kaelyn Mohamud is a 69 y.o. female who presents for Follow-up (2 week follow up).  HPI  69-year-old female patient well-known to me; first episode of sigmoid colon diverticulitis in 2022 which was managed with abx.  She went to the ER on December 12 due to LLQ abdominal pain for days. CT abd/pelvis showed diverticulitis of the sigmoid colon.  Normal wbc.  She was discharged home on Augmentin for 10 days.  Few days of the abx, she started having nonbloody watery diarrhea using 1-3 episodes a day.  She took Imodium yesterday and no diarrhea today.  She is also on chronic Keflex to prevent UTI.  Denies nausea, vomiting, fevers and chills.  Last colonoscopy was May 15, 2020.  No evidence of polyps but diverticulosis of the sigmoid colon.  Review of Systems    Objective   Physical Exam  Constitutional: no acute distress, well appearing and well nourished  Pulmonary: normal respiratory effort; clear to auscultation bilaterally, no wheezes or bronchi   Cardiovascular: regular rate and rhythm, no murmurs or extra-heart sounds; pedal pulses are normal; no extremities edema or varicosities, no peripheral edema  Abdomen: soft, non-tender, non-distended  Musculoskeletal: digits and nails normal without clubbing or cyanosis; Joints, bones and muscles are normal with normal range of motion; muscle strength/tone is normal  Skin: normal without rashes or lesion  Neurologic: cranial nerve II-XII intact grossly; normal gait  Psychiatric: oriented to person, place and time  Assessment/Plan   69-year-old female patient with recurrent sigmoid colon diverticulitis, first episode in 2022 and this current episode.  I reviewed the CT abdomen/pelvis and discussed findings again with patient.  Thickening and surrounding fat involving the sigmoid colon with no evidence of perforation; overall she is doing well. She will resume her Metamucil.  With the diarrhea, I will check stool for C. difficile colitis.  She will  continue the probiotic.  She will discuss with her urologist whether she can stop the Keflex since she has been on it for months. Followup in 3 wks         Sanjay Gomez MD 01/09/25 9:23 AM

## 2025-01-15 ENCOUNTER — LAB (OUTPATIENT)
Dept: LAB | Facility: LAB | Age: 70
End: 2025-01-15
Payer: COMMERCIAL

## 2025-01-15 DIAGNOSIS — K52.9 CHRONIC DIARRHEA OF UNKNOWN ORIGIN: ICD-10-CM

## 2025-01-22 ENCOUNTER — APPOINTMENT (OUTPATIENT)
Dept: OBSTETRICS AND GYNECOLOGY | Facility: CLINIC | Age: 70
End: 2025-01-22
Payer: COMMERCIAL

## 2025-01-30 ENCOUNTER — APPOINTMENT (OUTPATIENT)
Dept: SURGERY | Facility: CLINIC | Age: 70
End: 2025-01-30
Payer: COMMERCIAL

## 2025-01-30 DIAGNOSIS — R10.30 LOWER ABDOMINAL PAIN: Primary | ICD-10-CM

## 2025-01-30 DIAGNOSIS — R10.30 LOWER ABDOMINAL PAIN, UNSPECIFIED: ICD-10-CM

## 2025-01-30 DIAGNOSIS — K57.32 DIVERTICULITIS OF LARGE INTESTINE WITHOUT PERFORATION OR ABSCESS WITHOUT BLEEDING: ICD-10-CM

## 2025-01-30 LAB
CREAT SERPL-MCNC: 0.64 MG/DL (ref 0.5–1.05)
EGFRCR SERPLBLD CKD-EPI 2021: 96 ML/MIN/1.73M2

## 2025-01-30 PROCEDURE — 1123F ACP DISCUSS/DSCN MKR DOCD: CPT | Performed by: SURGERY

## 2025-01-30 PROCEDURE — 99214 OFFICE O/P EST MOD 30 MIN: CPT | Performed by: SURGERY

## 2025-01-30 PROCEDURE — 1160F RVW MEDS BY RX/DR IN RCRD: CPT | Performed by: SURGERY

## 2025-01-30 PROCEDURE — 1159F MED LIST DOCD IN RCRD: CPT | Performed by: SURGERY

## 2025-01-30 NOTE — PROGRESS NOTES
Subjective   Patient ID: Kaelyn Mohamud is a 69 y.o. female who presents for Follow-up (3 week follow up).  HPI  69-year-old female patient history of recurrent sigmoid colon diverticulitis. I last saw her January 9, 2025 and she was on 10 days course of Augmentin. For the past 2 days, she has been having excruciating, sharp, 7 out of 10 pain in left mid-abdominal region radiating across right mid-abdominal region with no relieving factor. She is unsure what started the pain. Per patient, she was unable to sleep all night due to the pain. Pain associated with nausea but no vomiting. Denies fevers and chills.      Objective   Physical Exam  Gen: no acute distress  CV: RRR  Pulm: CTAB  Abd: soft, non-distended, moderate tenderness in left and right mid abdominal regions, no peritoneal sign    Assessment/Plan   History of recurrent diverticulitis and has just completed 10 days course of Augmentin. Of particular concern is the worsening abdominal pain and nausea for past 2 days. I am concerned she may have recurrent diverticulitis. I will obtain CT abd/pelvis with IV contrast to rule out recurrent diverticulitis.          Sanjay Gomez MD 01/30/25 1:40 PM

## 2025-01-31 ENCOUNTER — HOSPITAL ENCOUNTER (OUTPATIENT)
Dept: RADIOLOGY | Facility: HOSPITAL | Age: 70
Discharge: HOME | End: 2025-01-31
Payer: COMMERCIAL

## 2025-01-31 DIAGNOSIS — K57.32 DIVERTICULITIS OF LARGE INTESTINE WITHOUT PERFORATION OR ABSCESS WITHOUT BLEEDING: ICD-10-CM

## 2025-01-31 DIAGNOSIS — R10.30 LOWER ABDOMINAL PAIN: ICD-10-CM

## 2025-01-31 PROCEDURE — 2550000001 HC RX 255 CONTRASTS: Performed by: SURGERY

## 2025-01-31 PROCEDURE — 74177 CT ABD & PELVIS W/CONTRAST: CPT

## 2025-01-31 RX ADMIN — IOHEXOL 75 ML: 350 INJECTION, SOLUTION INTRAVENOUS at 08:12

## 2025-02-06 ENCOUNTER — TELEPHONE (OUTPATIENT)
Dept: PRIMARY CARE | Facility: CLINIC | Age: 70
End: 2025-02-06
Payer: COMMERCIAL

## 2025-02-06 DIAGNOSIS — J06.9 UPPER RESPIRATORY TRACT INFECTION, UNSPECIFIED TYPE: ICD-10-CM

## 2025-02-07 RX ORDER — AMOXICILLIN 500 MG/1
500 CAPSULE ORAL 3 TIMES DAILY
Qty: 30 CAPSULE | Refills: 0 | Status: SHIPPED | OUTPATIENT
Start: 2025-02-07 | End: 2025-02-17

## 2025-02-16 LAB
APPEARANCE UR: CLEAR
BACTERIA UR CULT: NORMAL
BILIRUB UR QL STRIP: NEGATIVE
COLOR UR: YELLOW
GLUCOSE UR QL STRIP: NEGATIVE
HGB UR QL STRIP: NEGATIVE
KETONES UR QL STRIP: NEGATIVE
LEUKOCYTE ESTERASE UR QL STRIP: NEGATIVE
NITRITE UR QL STRIP: NEGATIVE
PH UR STRIP: 6.5 [PH] (ref 5–8)
PROT UR QL STRIP: NEGATIVE
SP GR UR STRIP: 1.01 (ref 1–1.03)

## 2025-02-20 ENCOUNTER — APPOINTMENT (OUTPATIENT)
Dept: SURGERY | Facility: CLINIC | Age: 70
End: 2025-02-20
Payer: COMMERCIAL

## 2025-02-20 VITALS — HEIGHT: 64 IN | BODY MASS INDEX: 21.68 KG/M2 | WEIGHT: 127 LBS

## 2025-02-20 DIAGNOSIS — K59.00 CONSTIPATION, UNSPECIFIED CONSTIPATION TYPE: Primary | ICD-10-CM

## 2025-02-20 DIAGNOSIS — K57.32 DIVERTICULITIS OF LARGE INTESTINE WITHOUT PERFORATION OR ABSCESS WITHOUT BLEEDING: ICD-10-CM

## 2025-02-20 PROCEDURE — 3008F BODY MASS INDEX DOCD: CPT | Performed by: SURGERY

## 2025-02-20 PROCEDURE — 1123F ACP DISCUSS/DSCN MKR DOCD: CPT | Performed by: SURGERY

## 2025-02-20 PROCEDURE — 99213 OFFICE O/P EST LOW 20 MIN: CPT | Performed by: SURGERY

## 2025-02-20 PROCEDURE — 1160F RVW MEDS BY RX/DR IN RCRD: CPT | Performed by: SURGERY

## 2025-02-20 PROCEDURE — 1159F MED LIST DOCD IN RCRD: CPT | Performed by: SURGERY

## 2025-02-20 NOTE — PROGRESS NOTES
Subjective   Patient ID: Kaelyn Mohamud is a 69 y.o. female who presents for Follow-up (3 week follow up).  HPI  69-year-old female patient well-known to me.  I last saw her December 30 2025th for lower abdominal pain.  CT abdomen/pelvis showed moderate colonic stool burden and mesenteritis.  She is here in follow-up.  She is taking the Metamucil when she remembers.  Denies abdominal pain, nausea, vomiting, fevers and chills.  Continues to have nonbloody bowel movements    Objective   Physical Exam  Constitutional: no acute distress, well appearing and well nourished  Pulmonary: normal respiratory effort; clear to auscultation bilaterally, no wheezes or bronchi   Cardiovascular: regular rate and rhythm, no murmurs or extra-heart sounds; pedal pulses are normal; no extremities edema or varicosities, no peripheral edema  Abdomen: soft, non-tender, non-distended  Musculoskeletal: digits and nails normal without clubbing or cyanosis; Joints, bones and muscles are normal with normal range of motion; muscle strength/tone is normal    Assessment/Plan   The patient continues to do well.  We talked about continuation of the Metamucil, high fiber diet and increase fluid intake.  Follow-up as needed       Sanjay Gomez MD 02/20/25 9:27 AM

## 2025-02-25 DIAGNOSIS — E78.5 HYPERLIPIDEMIA, UNSPECIFIED HYPERLIPIDEMIA TYPE: ICD-10-CM

## 2025-02-25 RX ORDER — ROSUVASTATIN CALCIUM 5 MG/1
5 TABLET, COATED ORAL DAILY
Qty: 90 TABLET | Refills: 1 | Status: SHIPPED | OUTPATIENT
Start: 2025-02-25

## 2025-02-26 ENCOUNTER — APPOINTMENT (OUTPATIENT)
Dept: UROLOGY | Facility: CLINIC | Age: 70
End: 2025-02-26
Payer: COMMERCIAL

## 2025-02-27 ENCOUNTER — TELEPHONE (OUTPATIENT)
Dept: PRIMARY CARE | Facility: CLINIC | Age: 70
End: 2025-02-27
Payer: COMMERCIAL

## 2025-02-28 ENCOUNTER — APPOINTMENT (OUTPATIENT)
Dept: CARDIOLOGY | Facility: HOSPITAL | Age: 70
End: 2025-02-28
Payer: COMMERCIAL

## 2025-02-28 ENCOUNTER — APPOINTMENT (OUTPATIENT)
Dept: RADIOLOGY | Facility: HOSPITAL | Age: 70
End: 2025-02-28
Payer: COMMERCIAL

## 2025-02-28 ENCOUNTER — HOSPITAL ENCOUNTER (EMERGENCY)
Facility: HOSPITAL | Age: 70
Discharge: HOME | End: 2025-02-28
Payer: COMMERCIAL

## 2025-02-28 ENCOUNTER — APPOINTMENT (OUTPATIENT)
Dept: UROLOGY | Facility: CLINIC | Age: 70
End: 2025-02-28
Payer: COMMERCIAL

## 2025-02-28 VITALS
OXYGEN SATURATION: 100 % | RESPIRATION RATE: 18 BRPM | HEIGHT: 64 IN | TEMPERATURE: 97.5 F | SYSTOLIC BLOOD PRESSURE: 147 MMHG | BODY MASS INDEX: 21.51 KG/M2 | DIASTOLIC BLOOD PRESSURE: 76 MMHG | HEART RATE: 84 BPM | WEIGHT: 126 LBS

## 2025-02-28 DIAGNOSIS — R53.1 GENERALIZED WEAKNESS: Primary | ICD-10-CM

## 2025-02-28 DIAGNOSIS — G47.00 INSOMNIA, UNSPECIFIED TYPE: ICD-10-CM

## 2025-02-28 DIAGNOSIS — F41.9 ANXIETY: ICD-10-CM

## 2025-02-28 LAB
ALBUMIN SERPL BCP-MCNC: 4.6 G/DL (ref 3.4–5)
ALP SERPL-CCNC: 57 U/L (ref 33–136)
ALT SERPL W P-5'-P-CCNC: 12 U/L (ref 7–45)
ANION GAP SERPL CALC-SCNC: 11 MMOL/L (ref 10–20)
APPEARANCE UR: CLEAR
AST SERPL W P-5'-P-CCNC: 17 U/L (ref 9–39)
ATRIAL RATE: 80 BPM
BASOPHILS # BLD AUTO: 0.04 X10*3/UL (ref 0–0.1)
BASOPHILS NFR BLD AUTO: 0.5 %
BILIRUB SERPL-MCNC: 0.3 MG/DL (ref 0–1.2)
BILIRUB UR STRIP.AUTO-MCNC: NEGATIVE MG/DL
BNP SERPL-MCNC: 18 PG/ML (ref 0–99)
BUN SERPL-MCNC: 18 MG/DL (ref 6–23)
CALCIUM SERPL-MCNC: 9.1 MG/DL (ref 8.6–10.3)
CARDIAC TROPONIN I PNL SERPL HS: <3 NG/L (ref 0–13)
CARDIAC TROPONIN I PNL SERPL HS: <3 NG/L (ref 0–13)
CHLORIDE SERPL-SCNC: 102 MMOL/L (ref 98–107)
CO2 SERPL-SCNC: 28 MMOL/L (ref 21–32)
COLOR UR: NORMAL
CREAT SERPL-MCNC: 0.69 MG/DL (ref 0.5–1.05)
EGFRCR SERPLBLD CKD-EPI 2021: >90 ML/MIN/1.73M*2
EOSINOPHIL # BLD AUTO: 0.08 X10*3/UL (ref 0–0.7)
EOSINOPHIL NFR BLD AUTO: 1.1 %
ERYTHROCYTE [DISTWIDTH] IN BLOOD BY AUTOMATED COUNT: 13.6 % (ref 11.5–14.5)
FLUAV RNA RESP QL NAA+PROBE: NOT DETECTED
FLUBV RNA RESP QL NAA+PROBE: NOT DETECTED
GLUCOSE SERPL-MCNC: 87 MG/DL (ref 74–99)
GLUCOSE UR STRIP.AUTO-MCNC: NORMAL MG/DL
HCT VFR BLD AUTO: 39.5 % (ref 36–46)
HGB BLD-MCNC: 12.9 G/DL (ref 12–16)
IMM GRANULOCYTES # BLD AUTO: 0.03 X10*3/UL (ref 0–0.7)
IMM GRANULOCYTES NFR BLD AUTO: 0.4 % (ref 0–0.9)
INR PPP: 0.9 (ref 0.9–1.1)
KETONES UR STRIP.AUTO-MCNC: NEGATIVE MG/DL
LACTATE SERPL-SCNC: 1.3 MMOL/L (ref 0.4–2)
LEUKOCYTE ESTERASE UR QL STRIP.AUTO: NEGATIVE
LYMPHOCYTES # BLD AUTO: 0.93 X10*3/UL (ref 1.2–4.8)
LYMPHOCYTES NFR BLD AUTO: 12.4 %
MAGNESIUM SERPL-MCNC: 2.1 MG/DL (ref 1.6–2.4)
MCH RBC QN AUTO: 29.3 PG (ref 26–34)
MCHC RBC AUTO-ENTMCNC: 32.7 G/DL (ref 32–36)
MCV RBC AUTO: 90 FL (ref 80–100)
MONOCYTES # BLD AUTO: 0.62 X10*3/UL (ref 0.1–1)
MONOCYTES NFR BLD AUTO: 8.2 %
NEUTROPHILS # BLD AUTO: 5.82 X10*3/UL (ref 1.2–7.7)
NEUTROPHILS NFR BLD AUTO: 77.4 %
NITRITE UR QL STRIP.AUTO: NEGATIVE
NRBC BLD-RTO: 0 /100 WBCS (ref 0–0)
P AXIS: 66 DEGREES
P OFFSET: 203 MS
P ONSET: 142 MS
PH UR STRIP.AUTO: 7 [PH]
PLATELET # BLD AUTO: 423 X10*3/UL (ref 150–450)
POTASSIUM SERPL-SCNC: 3.8 MMOL/L (ref 3.5–5.3)
PR INTERVAL: 162 MS
PROT SERPL-MCNC: 7.3 G/DL (ref 6.4–8.2)
PROT UR STRIP.AUTO-MCNC: NEGATIVE MG/DL
PROTHROMBIN TIME: 9.4 SECONDS (ref 9.8–12.4)
Q ONSET: 223 MS
QRS COUNT: 13 BEATS
QRS DURATION: 92 MS
QT INTERVAL: 372 MS
QTC CALCULATION(BAZETT): 429 MS
QTC FREDERICIA: 409 MS
R AXIS: 52 DEGREES
RBC # BLD AUTO: 4.4 X10*6/UL (ref 4–5.2)
RBC # UR STRIP.AUTO: NEGATIVE MG/DL
RSV RNA RESP QL NAA+PROBE: NOT DETECTED
SARS-COV-2 RNA RESP QL NAA+PROBE: NOT DETECTED
SODIUM SERPL-SCNC: 137 MMOL/L (ref 136–145)
SP GR UR STRIP.AUTO: 1.01
T AXIS: 54 DEGREES
T OFFSET: 409 MS
UROBILINOGEN UR STRIP.AUTO-MCNC: NORMAL MG/DL
VENTRICULAR RATE: 80 BPM
WBC # BLD AUTO: 7.5 X10*3/UL (ref 4.4–11.3)

## 2025-02-28 PROCEDURE — 85610 PROTHROMBIN TIME: CPT

## 2025-02-28 PROCEDURE — 84484 ASSAY OF TROPONIN QUANT: CPT

## 2025-02-28 PROCEDURE — 71046 X-RAY EXAM CHEST 2 VIEWS: CPT | Performed by: STUDENT IN AN ORGANIZED HEALTH CARE EDUCATION/TRAINING PROGRAM

## 2025-02-28 PROCEDURE — 87637 SARSCOV2&INF A&B&RSV AMP PRB: CPT

## 2025-02-28 PROCEDURE — 80053 COMPREHEN METABOLIC PANEL: CPT

## 2025-02-28 PROCEDURE — 93005 ELECTROCARDIOGRAM TRACING: CPT

## 2025-02-28 PROCEDURE — 36415 COLL VENOUS BLD VENIPUNCTURE: CPT

## 2025-02-28 PROCEDURE — 71046 X-RAY EXAM CHEST 2 VIEWS: CPT

## 2025-02-28 PROCEDURE — 83735 ASSAY OF MAGNESIUM: CPT

## 2025-02-28 PROCEDURE — 83605 ASSAY OF LACTIC ACID: CPT

## 2025-02-28 PROCEDURE — 99285 EMERGENCY DEPT VISIT HI MDM: CPT | Mod: 25

## 2025-02-28 PROCEDURE — 85025 COMPLETE CBC W/AUTO DIFF WBC: CPT

## 2025-02-28 PROCEDURE — 81003 URINALYSIS AUTO W/O SCOPE: CPT

## 2025-02-28 PROCEDURE — 83880 ASSAY OF NATRIURETIC PEPTIDE: CPT

## 2025-02-28 RX ORDER — SERTRALINE HYDROCHLORIDE 50 MG/1
TABLET, FILM COATED ORAL
Qty: 90 TABLET | Refills: 0 | Status: SHIPPED | OUTPATIENT
Start: 2025-02-28

## 2025-02-28 ASSESSMENT — PAIN - FUNCTIONAL ASSESSMENT
PAIN_FUNCTIONAL_ASSESSMENT: 0-10
PAIN_FUNCTIONAL_ASSESSMENT: 0-10

## 2025-02-28 ASSESSMENT — LIFESTYLE VARIABLES
HAVE PEOPLE ANNOYED YOU BY CRITICIZING YOUR DRINKING: NO
EVER HAD A DRINK FIRST THING IN THE MORNING TO STEADY YOUR NERVES TO GET RID OF A HANGOVER: NO
HAVE YOU EVER FELT YOU SHOULD CUT DOWN ON YOUR DRINKING: NO
EVER FELT BAD OR GUILTY ABOUT YOUR DRINKING: NO
TOTAL SCORE: 0

## 2025-02-28 ASSESSMENT — PAIN SCALES - GENERAL
PAINLEVEL_OUTOF10: 0 - NO PAIN
PAINLEVEL_OUTOF10: 0 - NO PAIN

## 2025-02-28 NOTE — ED PROVIDER NOTES
"HPI   Chief Complaint   Patient presents with    Weakness, Gen       69-year-old female with past medical history significant for recurrent UTIs, GERD, hyperlipidemia, depression, anxiety, and diverticulosis/diverticulitis presents emergency room for evaluation of generalized weakness x 4 days.  She states she is just felt off, weak, off balance, jittery, it feels as if her blood is low.  States she has been told in the past that she has iron deficiency anemia.  Patient also endorses history of insomnia, and took Benadryl last night to help her sleep.  She does endorse an occasional nonproductive mild cough.  Denies CP, SOB, abdominal pain, dysuria or urinary symptoms, back or flank pain, calf pain or swelling, headache, dizziness, and vertigo.  Denies any other symptoms or complaints.      History provided by:  Patient and medical records   used: No        Patient History   Past Medical History:   Diagnosis Date    Allergies     Anxiety     Arthritis     Diverticulosis     GERD (gastroesophageal reflux disease)     Hiatal hernia     Hyperlipidemia     Plantar fascial fibromatosis 10/06/2020    Plantar fasciitis of left foot    Urinary tract infection     Vertigo     Wears dentures     upper    Wears glasses      Past Surgical History:   Procedure Laterality Date    ABCESS DRAINAGE      perianal    ABSCESS DRAINAGE      \"skin abscess\"    CATARACT EXTRACTION      CHOLECYSTECTOMY      COLONOSCOPY      FOOT SURGERY      plantar fascitis    LAPAROSCOPY REPAIR HIATAL HERNIA      UPPER GASTROINTESTINAL ENDOSCOPY      US ASPIRATION INJECTION INTERMEDIATE JOINT  01/25/2022    US ASPIRATION INJECTION INTERMEDIATE JOINT 1/25/2022 [facility]     Family History   Problem Relation Name Age of Onset    Lung cancer Mother's Sister       Social History     Tobacco Use    Smoking status: Never    Smokeless tobacco: Never   Vaping Use    Vaping status: Never Used   Substance Use Topics    Alcohol use: " Yes     Alcohol/week: 4.0 standard drinks of alcohol     Types: 2 Cans of beer, 2 Shots of liquor per week    Drug use: Never       Physical Exam   ED Triage Vitals [02/28/25 1308]   Temperature Heart Rate Respirations BP   36 °C (96.8 °F) 96 18 158/76      Pulse Ox Temp Source Heart Rate Source Patient Position   99 % Temporal Monitor Sitting      BP Location FiO2 (%)     Right arm --       Physical Exam  Nursing notes reviewed and confirmed by me.  Chart review performed including medications, allergies, and medical, surgical, and family history      Constitutional: Vital signs per nursing notes.  Well developed, well nourished.  No acute distress.    Psychiatric: no abnormalities of mood or affect   Eyes: PERRL; conjunctivae and lids normal; EOMI  HENT: Head is normocephalic, atraumatic. External ears normal in appearance without drainage.  Nose is without deformity or drainage.  Posterior oropharynx without edema or erythema.  Moist mucous membranes.  Neck: neck supple, no meningismus signs or rigidity.  trachea midline without deviation.   Respiratory: Breath sounds clear bilaterally no wheezes rales or rhonchi.  No respiratory distress.  Normal respiratory rate/effort.    Cardiovascular: regular rate and rhythm; no murmurs.   distal pulses intact b/l throughout  Neurological: normal speech patient is alert and oriented x3.  Patient able to move extremities.  Grossly intact strength and sensation of upper and lower extremities.  No focal neurologic deficits appreciated on exam.  NIHSS 0.  Van negative.  Normal test of skew.  GI: Abdomen is soft nontender.  No rebound, rigidity, or guarding.  No masses or hernias appreciated.  No organomegaly.  Lymphatic: no significant lymphadenopathy appreciated  Musculoskeletal: Patient able to move all extremities.  No deformities or swelling appreciated.  No calf tenderness or edema.  Skin:  no rash or erythema.  No wounds. Normal capillary refill.    ED Course & MDM    Diagnoses as of 02/28/25 1658   Generalized weakness   Insomnia, unspecified type     Labs Reviewed   CBC WITH AUTO DIFFERENTIAL - Abnormal       Result Value    WBC 7.5      nRBC 0.0      RBC 4.40      Hemoglobin 12.9      Hematocrit 39.5      MCV 90      MCH 29.3      MCHC 32.7      RDW 13.6      Platelets 423      Neutrophils % 77.4      Immature Granulocytes %, Automated 0.4      Lymphocytes % 12.4      Monocytes % 8.2      Eosinophils % 1.1      Basophils % 0.5      Neutrophils Absolute 5.82      Immature Granulocytes Absolute, Automated 0.03      Lymphocytes Absolute 0.93 (*)     Monocytes Absolute 0.62      Eosinophils Absolute 0.08      Basophils Absolute 0.04     PROTIME-INR - Abnormal    Protime 9.4 (*)     INR 0.9     COMPREHENSIVE METABOLIC PANEL - Normal    Glucose 87      Sodium 137      Potassium 3.8      Chloride 102      Bicarbonate 28      Anion Gap 11      Urea Nitrogen 18      Creatinine 0.69      eGFR >90      Calcium 9.1      Albumin 4.6      Alkaline Phosphatase 57      Total Protein 7.3      AST 17      Bilirubin, Total 0.3      ALT 12     MAGNESIUM - Normal    Magnesium 2.10     LACTATE - Normal    Lactate 1.3      Narrative:     Venipuncture immediately after or during the administration of Metamizole may lead to falsely low results. Testing should be performed immediately prior to Metamizole dosing.   B-TYPE NATRIURETIC PEPTIDE - Normal    BNP 18      Narrative:        <100 pg/mL - Heart failure unlikely  100-299 pg/mL - Intermediate probability of acute heart                  failure exacerbation. Correlate with clinical                  context and patient history.    >=300 pg/mL - Heart Failure likely. Correlate with clinical                  context and patient history.    BNP testing is performed using different testing methodology at [facility] than at other [facility] hospitals. Direct result comparisons should only be made within the same method.      URINALYSIS WITH  REFLEX CULTURE AND MICROSCOPIC - Normal    Color, Urine Light-Yellow      Appearance, Urine Clear      Specific Gravity, Urine 1.007      pH, Urine 7.0      Protein, Urine NEGATIVE      Glucose, Urine Normal      Blood, Urine NEGATIVE      Ketones, Urine NEGATIVE      Bilirubin, Urine NEGATIVE      Urobilinogen, Urine Normal      Nitrite, Urine NEGATIVE      Leukocyte Esterase, Urine NEGATIVE     SERIAL TROPONIN-INITIAL - Normal    Troponin I, High Sensitivity <3      Narrative:     Less than 99th percentile of normal range cutoff-  Female and children under 18 years old <14 ng/L; Male <21 ng/L: Negative  Repeat testing should be performed if clinically indicated.     Female and children under 18 years old 14-50 ng/L; Male 21-50 ng/L:  Consistent with possible cardiac damage and possible increased clinical   risk. Serial measurements may help to assess extent of myocardial damage.     >50 ng/L: Consistent with cardiac damage, increased clinical risk and  myocardial infarction. Serial measurements may help assess extent of   myocardial damage.      NOTE: Children less than 1 year old may have higher baseline troponin   levels and results should be interpreted in conjunction with the overall   clinical context.     NOTE: Troponin I testing is performed using a different   testing methodology at [facility] than at other   [facility]. Direct result comparisons should only   be made within the same method.   SARS-COV-2 AND INFLUENZA A/B PCR - Normal    Flu A Result Not Detected      Flu B Result Not Detected      Coronavirus 2019, PCR Not Detected      Narrative:     This assay is an FDA-cleared, in vitro diagnostic nucleic acid amplification test for the qualitative detection and differentiation of SARS CoV-2/ Influenza A/B from nasopharyngeal specimens collected from individuals with signs and symptoms of respiratory tract infections, and has been validated for use at [facility]  [facility]. Negative results do not preclude COVID-19/ Influenza A/B infections and should not be used as the sole basis for diagnosis, treatment, or other management decisions. Testing for SARS CoV-2 is recommended only for patients who meet current clinical and/or epidemiological criteria defined by federal, state, or local public health directives.   RSV PCR - Normal    RSV PCR Not Detected      Narrative:     This assay is an FDA-cleared, in vitro diagnostic nucleic acid amplification test for the detection of RSV from nasopharyngeal specimens, and has been validated for use at [facility]. Negative results do not preclude RSV infections, and should not be used as the sole basis for diagnosis, treatment, or other management decisions. If Influenza A/B and RSV PCR results are negative, testing for Parainfluenza virus, Adenovirus and Metapneumovirus is routinely performed for pediatric oncology and intensive care inpatients at [facility], and is available on other patients by placing an add-on request.       SERIAL TROPONIN, 1 HOUR - Normal    Troponin I, High Sensitivity <3      Narrative:     Less than 99th percentile of normal range cutoff-  Female and children under 18 years old <14 ng/L; Male <21 ng/L: Negative  Repeat testing should be performed if clinically indicated.     Female and children under 18 years old 14-50 ng/L; Male 21-50 ng/L:  Consistent with possible cardiac damage and possible increased clinical   risk. Serial measurements may help to assess extent of myocardial damage.     >50 ng/L: Consistent with cardiac damage, increased clinical risk and  myocardial infarction. Serial measurements may help assess extent of   myocardial damage.      NOTE: Children less than 1 year old may have higher baseline troponin   levels and results should be interpreted in conjunction with the overall   clinical context.     NOTE: Troponin I testing is performed using a different   testing methodology  at [facility] than at other   [facility]. Direct result comparisons should only   be made within the same method.   TROPONIN SERIES- (INITIAL, 1 HR)    Narrative:     The following orders were created for panel order Troponin I Series, High Sensitivity (0, 1 HR).  Procedure                               Abnormality         Status                     ---------                               -----------         ------                     Troponin I, High Sensiti...[[id number]]  Normal              Final result               Troponin, High Sensitivi...[[id number]]  Normal              Final result                 Please view results for these tests on the individual orders.   URINALYSIS WITH REFLEX CULTURE AND MICROSCOPIC    Narrative:     The following orders were created for panel order Urinalysis with Reflex Culture and Microscopic.  Procedure                               Abnormality         Status                     ---------                               -----------         ------                     Urinalysis with Reflex C...[[id number]]  Normal              Final result               Extra Urine Gray Tube[[id number]]                            In process                   Please view results for these tests on the individual orders.   EXTRA URINE GRAY TUBE        XR chest 2 views   Final Result   1.  No evidence of acute cardiopulmonary process.                  MACRO:   None        Signed by: Bartolo Chicas 2/28/2025 4:21 PM   Dictation workstation:   [id number]            No data recorded                           Medical Decision Making  Ddx: Viral syndrome, Electrolyte abnormality, UTI, ACS  69-year-old female with past medical history significant for recurrent UTIs, GERD, hyperlipidemia, depression, anxiety, and diverticulosis/diverticulitis presents emergency room for evaluation of generalized weakness x 4 days.    Patient is nontoxic-appearing, in no acute distress.  Physical exam as  documented above.  PCR's for COVID-19, influenza, RSV were negative.  Chest x-ray revealing no evidence of acute cardiopulmonary process, high-sensitivity troponin x 2 were <3 and <3 with no evidence of acute ischemia on EKG giving low suspicion for ACS.  UA unremarkable with no evidence of UTI.  Lactate, magnesium, PT/INR unremarkable.  CMP with no electrolyte abnormality, and good kidney function.  CBC with no leukocytosis or anemia.  Given the patient's negative workup she will be discharged home and advised close follow-up with her PCP within a week for reevaluation.  She was given very strict return precautions.    As a result of the work-up, the patient was discharged home.  she was informed of her diagnosis, educated on lab and imaging findings, I explained reasons for the patient to return to the Emergency Department and instructed to come back with any concerns or worsening of condition.  she demonstrated verbal understanding and were in agreement with the plan of care.  I emphasized the importance of follow up with her PCP in the timeframe recommended.  she was given the opportunity to ask questions.  All of the patient's questions were answered.  Patient discharged in good stable condition.    This visit was staffed with onsite attending physician Dr. Soledad Chan.    Amount and/or Complexity of Data Reviewed  External Data Reviewed: labs, ECG and notes.  Labs: ordered. Decision-making details documented in ED Course.  Radiology: ordered. Decision-making details documented in ED Course.  ECG/medicine tests: ordered.     Details: EKG at 1449, with ventricular rate of 80, as interpreted by me, shows a normal rate and rhythm, normal axis, normal intervals. And normal ST and T wave pattern with no evidence of acute ischemia or other acute findings.  Compared with previous EKGs.           Israel Lieberman PA-C  03/01/25 0115

## 2025-03-01 DIAGNOSIS — E03.9 HYPOTHYROIDISM, UNSPECIFIED TYPE: ICD-10-CM

## 2025-03-01 LAB — HOLD SPECIMEN: NORMAL

## 2025-03-03 RX ORDER — LEVOTHYROXINE SODIUM 25 UG/1
TABLET ORAL
Qty: 90 TABLET | Refills: 1 | Status: SHIPPED | OUTPATIENT
Start: 2025-03-03

## 2025-03-13 ENCOUNTER — TELEPHONE (OUTPATIENT)
Dept: PRIMARY CARE | Facility: CLINIC | Age: 70
End: 2025-03-13
Payer: COMMERCIAL

## 2025-03-14 ENCOUNTER — APPOINTMENT (OUTPATIENT)
Dept: PRIMARY CARE | Facility: CLINIC | Age: 70
End: 2025-03-14
Payer: COMMERCIAL

## 2025-03-14 ENCOUNTER — HOSPITAL ENCOUNTER (OUTPATIENT)
Dept: RADIOLOGY | Facility: HOSPITAL | Age: 70
Discharge: HOME | End: 2025-03-14
Payer: COMMERCIAL

## 2025-03-14 VITALS
SYSTOLIC BLOOD PRESSURE: 138 MMHG | TEMPERATURE: 97.8 F | BODY MASS INDEX: 21.51 KG/M2 | HEIGHT: 64 IN | DIASTOLIC BLOOD PRESSURE: 82 MMHG | HEART RATE: 68 BPM | WEIGHT: 126 LBS

## 2025-03-14 DIAGNOSIS — R06.00 DYSPNEA, UNSPECIFIED TYPE: ICD-10-CM

## 2025-03-14 DIAGNOSIS — E03.9 HYPOTHYROIDISM, UNSPECIFIED TYPE: ICD-10-CM

## 2025-03-14 DIAGNOSIS — E78.5 HYPERLIPIDEMIA, UNSPECIFIED HYPERLIPIDEMIA TYPE: ICD-10-CM

## 2025-03-14 DIAGNOSIS — Z00.00 ROUTINE GENERAL MEDICAL EXAMINATION AT HEALTH CARE FACILITY: Primary | ICD-10-CM

## 2025-03-14 DIAGNOSIS — R06.2 WHEEZING: ICD-10-CM

## 2025-03-14 DIAGNOSIS — M79.642 PAIN OF LEFT HAND: ICD-10-CM

## 2025-03-14 DIAGNOSIS — F41.9 ANXIETY: ICD-10-CM

## 2025-03-14 PROCEDURE — 73130 X-RAY EXAM OF HAND: CPT | Mod: LT

## 2025-03-14 RX ORDER — LEVOTHYROXINE SODIUM 25 UG/1
25 TABLET ORAL DAILY
Qty: 90 TABLET | Refills: 1 | Status: SHIPPED | OUTPATIENT
Start: 2025-03-14

## 2025-03-14 RX ORDER — ALBUTEROL SULFATE 90 UG/1
2 INHALANT RESPIRATORY (INHALATION) EVERY 4 HOURS PRN
Qty: 8 G | Refills: 5 | Status: SHIPPED | OUTPATIENT
Start: 2025-03-14

## 2025-03-14 RX ORDER — ROSUVASTATIN CALCIUM 5 MG/1
5 TABLET, COATED ORAL DAILY
Qty: 90 TABLET | Refills: 1 | Status: SHIPPED | OUTPATIENT
Start: 2025-03-14

## 2025-03-14 RX ORDER — SERTRALINE HYDROCHLORIDE 50 MG/1
TABLET, FILM COATED ORAL
Qty: 90 TABLET | Refills: 0 | Status: SHIPPED | OUTPATIENT
Start: 2025-03-14

## 2025-03-14 ASSESSMENT — ENCOUNTER SYMPTOMS
ENDOCRINE NEGATIVE: 1
EYES NEGATIVE: 1
HEMATOLOGIC/LYMPHATIC NEGATIVE: 1
NEUROLOGICAL NEGATIVE: 1
CARDIOVASCULAR NEGATIVE: 1
FATIGUE: 1
GASTROINTESTINAL NEGATIVE: 1
COUGH: 1

## 2025-03-14 NOTE — PROGRESS NOTES
"Subjective   Reason for Visit: Kaelyn Mohamud is an 69 y.o. female here for a Medicare Wellness visit.          Reviewed all medications by prescribing practitioner or clinical pharmacist (such as prescriptions, OTCs, herbal therapies and supplements) and documented in the medical record.    Patient here for Medicare wellness exam she had gone to ER for insomnia and weakness.  She also smashed her finger and complains of pain and tenderness in the left little finger.        Patient Care Team:  Natalie Lombardo MD as PCP - General     Review of Systems   Constitutional:  Positive for fatigue.   Eyes: Negative.    Respiratory:  Positive for cough.    Cardiovascular: Negative.    Gastrointestinal: Negative.    Endocrine: Negative.    Genitourinary: Negative.    Neurological: Negative.    Hematological: Negative.    Psychiatric/Behavioral:          Anxiety,Depression   All other systems reviewed and are negative.      Objective   Vitals:  /82   Pulse 68   Temp 36.6 °C (97.8 °F)   Ht 1.626 m (5' 4\")   Wt 57.2 kg (126 lb)   BMI 21.63 kg/m²       Physical Exam  Vitals reviewed.   Constitutional:       Appearance: Normal appearance.   HENT:      Head: Normocephalic and atraumatic.   Cardiovascular:      Rate and Rhythm: Normal rate and regular rhythm.   Pulmonary:      Effort: Pulmonary effort is normal.      Breath sounds: Normal breath sounds.   Abdominal:      Palpations: Abdomen is soft. There is no mass.   Musculoskeletal:      Right lower leg: No edema.      Left lower leg: No edema.   Neurological:      General: No focal deficit present.      Mental Status: She is alert and oriented to person, place, and time.      Cranial Nerves: No cranial nerve deficit.       Assessment & Plan  Routine general medical examination at health care facility         Hypothyroidism, unspecified type    Orders:    TSH with reflex to Free T4 if abnormal; Future    Pain of left hand    Orders:    XR hand left 3+ views; " Future    TSH will be checked x-ray left hand will be checked patient most likely had viral syndrome and is recovering from it no antibiotics are necessary I reviewed her ER visit.  Her weakness is improving.  She will continue levothyroxine for hypothyroidism.  Continue sertraline for anxiety.  Continue statins for hypothyroidism.

## 2025-03-15 LAB — TSH SERPL-ACNC: 1.42 MIU/L (ref 0.4–4.5)

## 2025-03-19 ENCOUNTER — APPOINTMENT (OUTPATIENT)
Dept: UROLOGY | Facility: CLINIC | Age: 70
End: 2025-03-19
Payer: COMMERCIAL

## 2025-03-19 VITALS
RESPIRATION RATE: 16 BRPM | HEART RATE: 87 BPM | WEIGHT: 125 LBS | BODY MASS INDEX: 21.46 KG/M2 | DIASTOLIC BLOOD PRESSURE: 77 MMHG | SYSTOLIC BLOOD PRESSURE: 147 MMHG

## 2025-03-19 DIAGNOSIS — N35.12 POSTINFECTIVE URETHRAL STRICTURE IN FEMALE: ICD-10-CM

## 2025-03-19 DIAGNOSIS — N39.0 URINARY TRACT INFECTION WITHOUT HEMATURIA, SITE UNSPECIFIED: ICD-10-CM

## 2025-03-19 DIAGNOSIS — N32.89 BLADDER SPASMS: Primary | ICD-10-CM

## 2025-03-19 LAB
POC APPEARANCE, URINE: CLEAR
POC BILIRUBIN, URINE: NEGATIVE
POC BLOOD, URINE: NEGATIVE
POC COLOR, URINE: YELLOW
POC GLUCOSE, URINE: NEGATIVE MG/DL
POC KETONES, URINE: NEGATIVE MG/DL
POC LEUKOCYTES, URINE: NEGATIVE
POC NITRITE,URINE: NEGATIVE
POC PH, URINE: 6 PH
POC PROTEIN, URINE: NEGATIVE MG/DL
POC SPECIFIC GRAVITY, URINE: 1.02
POC UROBILINOGEN, URINE: 0.2 EU/DL

## 2025-03-19 PROCEDURE — 51798 US URINE CAPACITY MEASURE: CPT | Performed by: UROLOGY

## 2025-03-19 PROCEDURE — 52281 CYSTOSCOPY AND TREATMENT: CPT | Performed by: UROLOGY

## 2025-03-19 PROCEDURE — 81003 URINALYSIS AUTO W/O SCOPE: CPT | Performed by: UROLOGY

## 2025-03-19 NOTE — PROGRESS NOTES
Provider Impressions     69 year-old white female self-referred, previous patient of Dr. Oliver Smallwood, for RECURRENT UTI AND BLADDER SPASMS. Urinalysis is negative for blood. CAT scan of the chest, abdomen and pelvis is negative. Renal bladder ultrasound identifies a 3 mm right RENAL CALCULUS. Urine culture was negative but then a second urine culture with was positive for Klebsiella, UTI. Patient retired in 2018, previously employed in [employer]. No family history of breast or prostate cancer. The patient has never smoked cigarettes.      MACROBID  allergy     April 17, 2023, patient arrives alone. She states she has had Recurrent UTIs with Urgency and Frequency. She states that she has had URETHRAL STRICTURES. We discussed the etiology of recurrent UTIs and poor bladder emptying. She still has her uterus and ovaries. She is sexually active. She takes showers, not tub baths. She does not use a swimming pool, hot tub or bicycle. She has been diagnosed with a URETHRAL STRICTURE in the past. We will initiate cystoscopy with urethral dilatation, flow studies and a discussion of treatment options. She may require an alpha agent. Urine culture was negative as well as urine cytology     May 19, 2023, CYSTOSCOPY WITH URETHRAL DILATATION a 20 Kinyarwanda, flow studies and a discussion of treatment options. DENSE URETHRAL STRICTURE. Severe erythema in the lower half of the bladder. We will increase to 22 Kinyarwanda and 2 months. Flow rate 9 cc/s PVR 0 cc. Urinalysis and urine culture were negative. We will not add an alpha agent for antibiotic at this time.     August 9, 2023, cystoscopy with urethral dilatation to 20 Kinyarwanda. Attempts to 22 were unsuccessful due to pain. However the patient had a recent UTI and we will attempt that again at her next visit. Flow rate 2 cc/s, total volume 17 cc, PVR 0 cc. She states that after her first dilatation she noticed a dramatic improvement in flow and emptying. We will try to continue  to increase the size and strongly consider a low-dose daily cephalosporin if necessary.     August 26, 2023, cystoscopy with urethral dilatation to 22 Barbadian of a dense urethral stricture upward sloping. Moderate discomfort with no bleeding. Significant erythema in the bladder base and trigone with a very clear line of demarcation. No tumors or stones. Flow rate 2 cc/s, total volume 19 cc, PVR 0 cc. Urinalysis is negative for blood and urine culture no growth. She will return in 4 weeks and we will increase the size to 24 Barbadian at patient's request.     September 16, 2023, telephone call, urine culture was positive for E. coli treated with Bactrim     September 23, 2023, cystoscopy with dilatation of a dense urethral stricture to 24 Barbadian with moderate bleeding and moderate pain. Erythema confined to the bladder base and trigone. We will not increase the size as this appears to be her limit. . I have added Bactrim single strength daily at this time.. She will return in 4 weeks and then we will increase times weekly     December 16, 2023, cystoscopy with dilatation of a dense urethral stricture to 24 Barbadian with minimal bleeding and moderate pain. Erythema confined to the trigone. We will not increase the size as this appears to be her limit. . I have added Bactrim single strength daily at this time. She will return in 5 weeks and then we will increase times weekly     January 20, 2024, telephone call, urine culture is positive for E. coli.  Keflex ordered.  Resistant to Bactrim.     January 23, 2024, cystoscopy with dilatation of a dense urethral stricture to 24 Barbadian with minimal pain and minimal bleeding.  Once again erythema confined to the trigone.  24 Barbadian is the patient's limit.  We will change her daily Bactrim to Keflex as it was resistant to her most recent E. coli infection.  We will expand to 6-week regimen.     March 5, 2024, cystoscopy with dilatation of a dense upward sloping urethral stricture to  "24 Gabonese with moderate discomfort.  Erythema confined to the bladder base and trigone.  The patient cannot tolerate dilation larger than 24 Gabonese.  She maintains her daily Keflex.  We will expand to 7 weeks.     April 23, 2024, cystoscopy with dilatation of a dense upward sloping urethral stricture to 24 Gabonese with minimal discomfort.  Erythema again in the bladder base and trigone.  This patient cannot tolerate dilatation larger than the 24 Gabonese size.  We will transition her from daily Keflex to twice a week Keflex at the 500 mg dose.  We will expand her regimen to 8 weeks.  She wishes to continue as she has less frequency, less urgency, and less nocturia.  Renal colic CAT scan does not identify any stones in the kidneys or ureters.  Urine culture no growth.  Urinalysis was negative for blood.     August 16, 2024, patient arrives alone.  As we have discussed before, the patient was in control regarding continuation of dilatation.  She felt that she no longer had frequency, urgency or nocturia and discontinued plans for any further urethral dilatation.  She returns today complaining of urinary frequency, urinary urgency, and that her urine is \"blocked\" when she is trying to urinate.  She states that she is now straining to urinate and wishes to reinitiate a schedule once again.  She continues on Keflex twice a week 500 mg.     September 9, 2024, cystoscopy with urethral dilatation to 24 Gabonese with moderate pain and no bleeding.  Erythema posteriorly.  Dense upward sloping urethral stricture.  She would like to start her regimen again and will return in 4 weeks.  PVR of 83 cc.  She continues on Keflex 500 mg twice a week.  Urinalysis was negative for blood and urine culture no growth.     October 9, 2024, cystoscopy with urethral dilatation to 24 Gabonese with minimal pain and no bleeding.  Mild erythema in the bladder base.  Dense upward sloping urethral stricture.  No further straining for urination, no further " urinary frequency and no further postvoid dribbling.  PVR of 16 cc.  We will expand her regimen to 5 weeks.  Urinalysis was negative for blood and urine culture no growth.  Continues on Keflex 500 mg twice a week.     January 4, 2025, cystoscopy with urethral dilatation of a dense upward sloping urethral stricture to 24 Cymro with minimal discomfort and no bleeding.  Erythema is significant posteriorly.  Patient is quite happy with her regimen and no longer experiences straining with urination, urinary frequency or postvoid dribbling.  PVR of 18 cc.  She continues on Keflex 500 mg twice a week.  Urinalysis was negative for blood and urine culture no growth.  She was delayed for this procedure due to a bout of diverticulitis which resulted in a bowel perforation.  She will return in 6 weeks.    March 19, 2025, cystoscopy with urethral dilatation of an upward sloping dense urethral stricture to 24 Cymro with minimal discomfort and no bleeding.  Erythema is confined to the bladder base and trigone with a clear line of demarcation.  PVR of 24 cc.  She is very happy with the regimen no longer experiencing straining with urination, urinary frequency or postvoid dribbling.  She also was not having repeat UTIs.  She is on Keflex 500 mg twice a week.  Her general surgeon, Dr. Sanjay Gomez, has recommended discontinuing that medication.  Urinalysis is negative for blood and urine culture no growth.  We will expand to 7 weeks.     PLAN:     1. The patient will return in 7 weeks  INCREASE TIME PERIODS WEEKLY. for cystoscopy with urethral dilatation to 24 Cymro, PVR and a URINALYSIS AND URINE CULTURE. She will receive PROPHYLAXIS WITH KEFLEX 250 mg p.o. every 12 hours for 4 doses. We will HOLD the size at 24 Cymro       #2  Keflex 500 mg p.o. q. Wednesday and q. Sunday     3.  In April 2025, cystoscopy with dilatation to 24 Cymro, PVR, urinalysis, urine culture, renal colic CAT scan     Physical Exam  Vitals and nursing  note reviewed. Exam conducted with a chaperone present.   Constitutional:       Appearance: Normal appearance.   HENT:      Head: Normocephalic and atraumatic.   Pulmonary:      Effort: Pulmonary effort is normal.   Abdominal:      Palpations: Abdomen is soft.      Tenderness: There is no abdominal tenderness.   Genitourinary:     General: Normal vulva.      Vagina: No vaginal discharge.   Musculoskeletal:         General: Normal range of motion.      Cervical back: Normal range of motion and neck supple.   Neurological:      General: No focal deficit present.      Mental Status: She is alert and oriented to person, place, and time.   Psychiatric:         Mood and Affect: Mood normal.         Behavior: Behavior normal.        This note was created with voice-recognition software and was not corrected for typographical or grammatical errors

## 2025-03-19 NOTE — PROGRESS NOTES
"Patient ID: Kaelyn Mohamud is a 69 y.o. female.    Procedures  Pt took Keflex 250mg po as prescribed  Anesthesia: Local 2% Lidocaine  Instruments: 6F flexible disposable Cystoscope     Pt brought to procedure room and placed in dorsal lithotomy position. Pt draped and prepped in normal sterile fashion. 5ml lidocaine instilled into urethral meatus and 5ml instilled into urethra (urethra). Pt tolerated well.     I was present as chaperone for the entirety of the procedure   Eliza Iraheta  Cystoscopy performed by Dr. Dale Wagoner        Bedside \"Time Out\" Verification   Today's Date: 03/19/2025 I attest that this time out verification took place prior to the procedure.   Procedure: Cysto/dil  RN/LPN/MA: KAREN   Provider: WAL.   Verified By: MA and Provider, Dr. Dale Wagoner.   Prior to the start of the procedure a time out was taken and the following were verified: the identity of the patient using two patient identifiers, the correct procedure, the correct site marked as indicated, the correct positioning for the patient and the correct equipment was obtained.   Cystoscopy - Kaelyn Mohamud   identified using two (2) forms of identification.   Procedure: diagnostic Cystourethroscopy   Procedure Note: Time Started: 3:00pm  Time Completed: 3:38 PM  Indications for procedure: Cysto/Dil 24F- Urethral Stricture      Discussed with patient: Risks, benefits, and alternative were discussed in detail. Patient appears to understand and agrees to proceed. Patient has signed the procedure consent form.    CYSTOSCOPY:    Cystoscopy today reveals a dense upward sloping urethral stricture dilated to 24 Romansh.  Erythema is confined to the trigone and bladder base with a clear line of demarcation.  No stones or tumors.  PVR 24 cc.  "

## 2025-03-19 NOTE — LETTER
March 19, 2025     Natalie Lombardo MD  [address]  [facility] [address]    Patient: Kaelyn Mohamud   YOB: 1955   Date of Visit: 3/19/2025       Dear Dr. Natalie Lombardo MD:    Thank you for referring Kaelyn Mohamud to me for evaluation. Below are my notes for this consultation.  If you have questions, please do not hesitate to call me. I look forward to following your patient along with you.       Sincerely,     Dale Wagoner MD      CC: No Recipients  ______________________________________________________________________________________      Provider Impressions     69 year-old white female self-referred, previous patient of Dr. Oliver Smallwood, for RECURRENT UTI AND BLADDER SPASMS. Urinalysis is negative for blood. CAT scan of the chest, abdomen and pelvis is negative. Renal bladder ultrasound identifies a 3 mm right RENAL CALCULUS. Urine culture was negative but then a second urine culture with was positive for Klebsiella, UTI. Patient retired in 2018, previously employed in [employer]. No family history of breast or prostate cancer. The patient has never smoked cigarettes.      MACROBID  allergy     April 17, 2023, patient arrives alone. She states she has had Recurrent UTIs with Urgency and Frequency. She states that she has had URETHRAL STRICTURES. We discussed the etiology of recurrent UTIs and poor bladder emptying. She still has her uterus and ovaries. She is sexually active. She takes showers, not tub baths. She does not use a swimming pool, hot tub or bicycle. She has been diagnosed with a URETHRAL STRICTURE in the past. We will initiate cystoscopy with urethral dilatation, flow studies and a discussion of treatment options. She may require an alpha agent. Urine culture was negative as well as urine cytology     May 19, 2023, CYSTOSCOPY WITH URETHRAL DILATATION a 20 Tajik, flow studies and a discussion of treatment options. DENSE URETHRAL STRICTURE. Severe  erythema in the lower half of the bladder. We will increase to 22 Niuean and 2 months. Flow rate 9 cc/s PVR 0 cc. Urinalysis and urine culture were negative. We will not add an alpha agent for antibiotic at this time.     August 9, 2023, cystoscopy with urethral dilatation to 20 Niuean. Attempts to 22 were unsuccessful due to pain. However the patient had a recent UTI and we will attempt that again at her next visit. Flow rate 2 cc/s, total volume 17 cc, PVR 0 cc. She states that after her first dilatation she noticed a dramatic improvement in flow and emptying. We will try to continue to increase the size and strongly consider a low-dose daily cephalosporin if necessary.     August 26, 2023, cystoscopy with urethral dilatation to 22 Niuean of a dense urethral stricture upward sloping. Moderate discomfort with no bleeding. Significant erythema in the bladder base and trigone with a very clear line of demarcation. No tumors or stones. Flow rate 2 cc/s, total volume 19 cc, PVR 0 cc. Urinalysis is negative for blood and urine culture no growth. She will return in 4 weeks and we will increase the size to 24 Niuean at patient's request.     September 16, 2023, telephone call, urine culture was positive for E. coli treated with Bactrim     September 23, 2023, cystoscopy with dilatation of a dense urethral stricture to 24 Niuean with moderate bleeding and moderate pain. Erythema confined to the bladder base and trigone. We will not increase the size as this appears to be her limit. . I have added Bactrim single strength daily at this time.. She will return in 4 weeks and then we will increase times weekly     December 16, 2023, cystoscopy with dilatation of a dense urethral stricture to 24 Niuean with minimal bleeding and moderate pain. Erythema confined to the trigone. We will not increase the size as this appears to be her limit. . I have added Bactrim single strength daily at this time. She will return in 5 weeks and  "then we will increase times weekly     January 20, 2024, telephone call, urine culture is positive for E. coli.  Keflex ordered.  Resistant to Bactrim.     January 23, 2024, cystoscopy with dilatation of a dense urethral stricture to 24 Argentine with minimal pain and minimal bleeding.  Once again erythema confined to the trigone.  24 Argentine is the patient's limit.  We will change her daily Bactrim to Keflex as it was resistant to her most recent E. coli infection.  We will expand to 6-week regimen.     March 5, 2024, cystoscopy with dilatation of a dense upward sloping urethral stricture to 24 Argentine with moderate discomfort.  Erythema confined to the bladder base and trigone.  The patient cannot tolerate dilation larger than 24 Argentine.  She maintains her daily Keflex.  We will expand to 7 weeks.     April 23, 2024, cystoscopy with dilatation of a dense upward sloping urethral stricture to 24 Argentine with minimal discomfort.  Erythema again in the bladder base and trigone.  This patient cannot tolerate dilatation larger than the 24 Argentine size.  We will transition her from daily Keflex to twice a week Keflex at the 500 mg dose.  We will expand her regimen to 8 weeks.  She wishes to continue as she has less frequency, less urgency, and less nocturia.  Renal colic CAT scan does not identify any stones in the kidneys or ureters.  Urine culture no growth.  Urinalysis was negative for blood.     August 16, 2024, patient arrives alone.  As we have discussed before, the patient was in control regarding continuation of dilatation.  She felt that she no longer had frequency, urgency or nocturia and discontinued plans for any further urethral dilatation.  She returns today complaining of urinary frequency, urinary urgency, and that her urine is \"blocked\" when she is trying to urinate.  She states that she is now straining to urinate and wishes to reinitiate a schedule once again.  She continues on Keflex twice a week 500 mg.   "   September 9, 2024, cystoscopy with urethral dilatation to 24 Angolan with moderate pain and no bleeding.  Erythema posteriorly.  Dense upward sloping urethral stricture.  She would like to start her regimen again and will return in 4 weeks.  PVR of 83 cc.  She continues on Keflex 500 mg twice a week.  Urinalysis was negative for blood and urine culture no growth.     October 9, 2024, cystoscopy with urethral dilatation to 24 Angolan with minimal pain and no bleeding.  Mild erythema in the bladder base.  Dense upward sloping urethral stricture.  No further straining for urination, no further urinary frequency and no further postvoid dribbling.  PVR of 16 cc.  We will expand her regimen to 5 weeks.  Urinalysis was negative for blood and urine culture no growth.  Continues on Keflex 500 mg twice a week.     January 4, 2025, cystoscopy with urethral dilatation of a dense upward sloping urethral stricture to 24 Angolan with minimal discomfort and no bleeding.  Erythema is significant posteriorly.  Patient is quite happy with her regimen and no longer experiences straining with urination, urinary frequency or postvoid dribbling.  PVR of 18 cc.  She continues on Keflex 500 mg twice a week.  Urinalysis was negative for blood and urine culture no growth.  She was delayed for this procedure due to a bout of diverticulitis which resulted in a bowel perforation.  She will return in 6 weeks.    March 19, 2025, cystoscopy with urethral dilatation of an upward sloping dense urethral stricture to 24 Angolan with minimal discomfort and no bleeding.  Erythema is confined to the bladder base and trigone with a clear line of demarcation.  PVR of 24 cc.  She is very happy with the regimen no longer experiencing straining with urination, urinary frequency or postvoid dribbling.  She also was not having repeat UTIs.  She is on Keflex 500 mg twice a week.  Her general surgeon, Dr. Sanjay Gomez, has recommended discontinuing that medication.   Urinalysis is negative for blood and urine culture no growth.  We will expand to 7 weeks.     PLAN:     1. The patient will return in 7 weeks  INCREASE TIME PERIODS WEEKLY. for cystoscopy with urethral dilatation to 24 Canadian, PVR and a URINALYSIS AND URINE CULTURE. She will receive PROPHYLAXIS WITH KEFLEX 250 mg p.o. every 12 hours for 4 doses. We will HOLD the size at 24 Canadian       #2  Keflex 500 mg p.o. q. Wednesday and q. Sunday     3.  In April 2025, cystoscopy with dilatation to 24 Canadian, PVR, urinalysis, urine culture, renal colic CAT scan     Physical Exam  Vitals and nursing note reviewed. Exam conducted with a chaperone present.   Constitutional:       Appearance: Normal appearance.   HENT:      Head: Normocephalic and atraumatic.   Pulmonary:      Effort: Pulmonary effort is normal.   Abdominal:      Palpations: Abdomen is soft.      Tenderness: There is no abdominal tenderness.   Genitourinary:     General: Normal vulva.      Vagina: No vaginal discharge.   Musculoskeletal:         General: Normal range of motion.      Cervical back: Normal range of motion and neck supple.   Neurological:      General: No focal deficit present.      Mental Status: She is alert and oriented to person, place, and time.   Psychiatric:         Mood and Affect: Mood normal.         Behavior: Behavior normal.        This note was created with voice-recognition software and was not corrected for typographical or grammatical errors

## 2025-03-24 ENCOUNTER — TELEPHONE (OUTPATIENT)
Dept: PRIMARY CARE | Facility: CLINIC | Age: 70
End: 2025-03-24
Payer: COMMERCIAL

## 2025-03-24 DIAGNOSIS — F41.9 ANXIETY: Primary | ICD-10-CM

## 2025-03-24 RX ORDER — HYDROXYZINE PAMOATE 25 MG/1
25 CAPSULE ORAL DAILY
Qty: 30 CAPSULE | Refills: 2 | Status: SHIPPED | OUTPATIENT
Start: 2025-03-24 | End: 2025-06-22

## 2025-04-01 DIAGNOSIS — N35.12 POSTINFECTIVE URETHRAL STRICTURE IN FEMALE: ICD-10-CM

## 2025-04-01 DIAGNOSIS — N39.0 URINARY TRACT INFECTION WITHOUT HEMATURIA, SITE UNSPECIFIED: ICD-10-CM

## 2025-04-01 DIAGNOSIS — N32.89 BLADDER SPASMS: ICD-10-CM

## 2025-04-13 ENCOUNTER — APPOINTMENT (OUTPATIENT)
Dept: RADIOLOGY | Facility: HOSPITAL | Age: 70
End: 2025-04-13
Payer: COMMERCIAL

## 2025-04-13 ENCOUNTER — HOSPITAL ENCOUNTER (EMERGENCY)
Facility: HOSPITAL | Age: 70
Discharge: HOME | End: 2025-04-13
Payer: COMMERCIAL

## 2025-04-13 VITALS
OXYGEN SATURATION: 98 % | RESPIRATION RATE: 18 BRPM | TEMPERATURE: 98.1 F | WEIGHT: 128 LBS | DIASTOLIC BLOOD PRESSURE: 72 MMHG | HEART RATE: 78 BPM | HEIGHT: 64 IN | BODY MASS INDEX: 21.85 KG/M2 | SYSTOLIC BLOOD PRESSURE: 166 MMHG

## 2025-04-13 DIAGNOSIS — B34.9 ACUTE VIRAL SYNDROME: Primary | ICD-10-CM

## 2025-04-13 LAB
APPEARANCE UR: CLEAR
BILIRUB UR STRIP.AUTO-MCNC: NEGATIVE MG/DL
COLOR UR: ABNORMAL
FLUAV RNA RESP QL NAA+PROBE: NOT DETECTED
FLUBV RNA RESP QL NAA+PROBE: NOT DETECTED
GLUCOSE UR STRIP.AUTO-MCNC: NORMAL MG/DL
KETONES UR STRIP.AUTO-MCNC: ABNORMAL MG/DL
LEUKOCYTE ESTERASE UR QL STRIP.AUTO: NEGATIVE
NITRITE UR QL STRIP.AUTO: NEGATIVE
PH UR STRIP.AUTO: 7.5 [PH]
PROT UR STRIP.AUTO-MCNC: NEGATIVE MG/DL
RBC # UR STRIP.AUTO: NEGATIVE MG/DL
RSV RNA RESP QL NAA+PROBE: NOT DETECTED
S PYO DNA THROAT QL NAA+PROBE: NOT DETECTED
SARS-COV-2 RNA RESP QL NAA+PROBE: NOT DETECTED
SP GR UR STRIP.AUTO: 1.01
UROBILINOGEN UR STRIP.AUTO-MCNC: NORMAL MG/DL

## 2025-04-13 PROCEDURE — 87651 STREP A DNA AMP PROBE: CPT | Performed by: PHYSICIAN ASSISTANT

## 2025-04-13 PROCEDURE — 81003 URINALYSIS AUTO W/O SCOPE: CPT | Performed by: PHYSICIAN ASSISTANT

## 2025-04-13 PROCEDURE — 71046 X-RAY EXAM CHEST 2 VIEWS: CPT | Mod: FOREIGN READ | Performed by: RADIOLOGY

## 2025-04-13 PROCEDURE — 87637 SARSCOV2&INF A&B&RSV AMP PRB: CPT | Performed by: PHYSICIAN ASSISTANT

## 2025-04-13 PROCEDURE — 71046 X-RAY EXAM CHEST 2 VIEWS: CPT

## 2025-04-13 PROCEDURE — 99284 EMERGENCY DEPT VISIT MOD MDM: CPT | Mod: 25

## 2025-04-13 ASSESSMENT — LIFESTYLE VARIABLES
HAVE YOU EVER FELT YOU SHOULD CUT DOWN ON YOUR DRINKING: NO
TOTAL SCORE: 0
HAVE PEOPLE ANNOYED YOU BY CRITICIZING YOUR DRINKING: NO
EVER HAD A DRINK FIRST THING IN THE MORNING TO STEADY YOUR NERVES TO GET RID OF A HANGOVER: NO
EVER FELT BAD OR GUILTY ABOUT YOUR DRINKING: NO

## 2025-04-13 ASSESSMENT — PAIN SCALES - GENERAL: PAINLEVEL_OUTOF10: 0 - NO PAIN

## 2025-04-13 ASSESSMENT — PAIN - FUNCTIONAL ASSESSMENT: PAIN_FUNCTIONAL_ASSESSMENT: 0-10

## 2025-04-13 NOTE — ED PROVIDER NOTES
"HPI   Chief Complaint   Patient presents with    Flu Symptoms     Runny nose, cough, and fever for the past two days       A 69-year-old female patient comes into the emergency department today with complaints of cough, congestion over the past 2 days.  States she has had a fever of 101.  Describes her cough is nonproductive and dry.  Denies any urinary dysuria, frequency or urgency.  Denies any sick contacts that she is aware of.  Denies any shortness of breath.  For this purpose comes in the emergency department today for the evaluation.              Patient History   Past Medical History:   Diagnosis Date    Allergies     Anxiety     Arthritis     Diverticulosis     GERD (gastroesophageal reflux disease)     Hiatal hernia     Hyperlipidemia     Plantar fascial fibromatosis 10/06/2020    Plantar fasciitis of left foot    Urinary tract infection     Vertigo     Wears dentures     upper    Wears glasses      Past Surgical History:   Procedure Laterality Date    ABCESS DRAINAGE      perianal    ABSCESS DRAINAGE      \"skin abscess\"    CATARACT EXTRACTION      CHOLECYSTECTOMY      COLONOSCOPY      FOOT SURGERY      plantar fascitis    LAPAROSCOPY REPAIR HIATAL HERNIA      UPPER GASTROINTESTINAL ENDOSCOPY      US ASPIRATION INJECTION INTERMEDIATE JOINT  01/25/2022    US ASPIRATION INJECTION INTERMEDIATE JOINT 1/25/2022 [facility]     Family History   Problem Relation Name Age of Onset    Lung cancer Mother's Sister       Social History     Tobacco Use    Smoking status: Never    Smokeless tobacco: Never   Vaping Use    Vaping status: Never Used   Substance Use Topics    Alcohol use: Yes     Alcohol/week: 4.0 standard drinks of alcohol     Types: 2 Cans of beer, 2 Shots of liquor per week    Drug use: Never       Physical Exam   ED Triage Vitals [04/13/25 1448]   Temperature Heart Rate Respirations BP   36.7 °C (98.1 °F) 93 18 125/58      Pulse Ox Temp Source Heart Rate Source Patient Position   97 % Temporal " Monitor Sitting      BP Location FiO2 (%)     Right arm --       Physical Exam  Constitutional:       General: She is not in acute distress.     Appearance: Normal appearance. She is ill-appearing. She is not diaphoretic.   HENT:      Head: Normocephalic and atraumatic.      Nose: Congestion and rhinorrhea present.   Eyes:      Extraocular Movements: Extraocular movements intact.      Conjunctiva/sclera: Conjunctivae normal.      Pupils: Pupils are equal, round, and reactive to light.   Cardiovascular:      Rate and Rhythm: Normal rate and regular rhythm.   Pulmonary:      Effort: Pulmonary effort is normal. No respiratory distress.      Breath sounds: Normal breath sounds. No stridor. No wheezing or rhonchi.   Musculoskeletal:         General: Normal range of motion.      Cervical back: Normal range of motion.   Skin:     General: Skin is warm and dry.   Neurological:      General: No focal deficit present.      Mental Status: She is alert and oriented to person, place, and time. Mental status is at baseline.   Psychiatric:         Mood and Affect: Mood normal.           ED Course & MDM                  No data recorded     [place] Coma Scale Score: 15 (04/13/25 1449 : Kaylynn Conroy RN)                           Medical Decision Making  A 69-year-old female patient comes into the emergency department today with complaints of cough, congestion over the past 2 days.  States she has had a fever of 101.  Describes her cough is nonproductive and dry.  Denies any urinary dysuria, frequency or urgency.  Denies any sick contacts that she is aware of.  Denies any shortness of breath.  For this purpose comes in the emergency department today for the evaluation.    Chest x-ray ordered to rule out pneumonia, testing for COVID-19, influenza, RSV, strep pharyngitis.    Handoff to Israel Lieberman pending chest x-ray, laboratory studies, reevaluation disposition      Labs Reviewed   GROUP A STREPTOCOCCUS, PCR - Normal       Result  Value    Group A Strep PCR Not Detected     URINALYSIS WITH REFLEX CULTURE AND MICROSCOPIC    Narrative:     The following orders were created for panel order Urinalysis with Reflex Culture and Microscopic.  Procedure                               Abnormality         Status                     ---------                               -----------         ------                     Urinalysis with Reflex C...[[id number]]                                                 Extra Urine Gray Tube[[id number]]                                                         Please view results for these tests on the individual orders.   SARS-COV-2 PCR   INFLUENZA A AND B PCR   RSV PCR   URINALYSIS WITH REFLEX CULTURE AND MICROSCOPIC   EXTRA URINE GRAY TUBE        XR chest 2 views    (Results Pending)         Procedure  Procedures     Israel Robertson PA-C  04/13/25 6258

## 2025-04-14 ENCOUNTER — APPOINTMENT (OUTPATIENT)
Dept: PRIMARY CARE | Facility: CLINIC | Age: 70
End: 2025-04-14
Payer: COMMERCIAL

## 2025-04-14 DIAGNOSIS — J06.9 VIRAL UPPER RESPIRATORY TRACT INFECTION: Primary | ICD-10-CM

## 2025-04-14 DIAGNOSIS — D47.3 ESSENTIAL (HEMORRHAGIC) THROMBOCYTHEMIA: ICD-10-CM

## 2025-04-14 LAB — HOLD SPECIMEN: NORMAL

## 2025-04-14 PROCEDURE — 1123F ACP DISCUSS/DSCN MKR DOCD: CPT | Performed by: INTERNAL MEDICINE

## 2025-04-14 PROCEDURE — 99213 OFFICE O/P EST LOW 20 MIN: CPT | Performed by: INTERNAL MEDICINE

## 2025-04-14 PROCEDURE — 1159F MED LIST DOCD IN RCRD: CPT | Performed by: INTERNAL MEDICINE

## 2025-04-14 NOTE — PROGRESS NOTES
Subjective   Patient ID: Kaelyn Mohamud is a 69 y.o. female who presents for URI and Follow-up (Pt doing virtual positive for covid  symptoms started Friday with runny noseon sat coughing sneezing runny eyes fever 101.8 ).    HPI she has been sick since Friday she has been coughing having fever chills she went to ER and her testing for flu RSV COVID was negative she complains of a lot of rhinorrhea she has been taking over-the-counter treatments    Review of Systems  As mentioned above in HPI all other 12 review of system negative.  She does have a history of anxiety and hypothyroidism and thrombocytosis.  Objective   There were no vitals taken for this visit.    Physical Exam  Constitutional:       Appearance: Normal appearance.   Neurological:      Mental Status: She is alert.   Psychiatric:         Mood and Affect: Mood normal.         Thought Content: Thought content normal.         Assessment/Plan   Problem List Items Addressed This Visit             ICD-10-CM    Essential (hemorrhagic) thrombocythemia D47.3     Other Visit Diagnoses         Codes    Viral upper respiratory tract infection    -  Primary J06.9        We reviewed her chest x-ray and explained to patient that she has viral upper respiratory infection which she usually does not require any antibiotics.  Follow-up with us in few weeks.

## 2025-04-15 NOTE — PROGRESS NOTES
Emergency Medicine Transition of Care Note.    I received Kaelyn Mohamud in signout from Israel Robertson PA-C.  Please see the previous ED provider note for all HPI, PE and MDM up to the time of signout at 1600. This is in addition to the primary record.    In brief Kaelyn Mohamud is an 69 y.o. female presenting for   Chief Complaint   Patient presents with    Flu Symptoms     Runny nose, cough, and fever for the past two days     At the time of signout we were awaiting: Chest x-ray, UA, reevaluation and disposition    Diagnoses as of 04/15/25 1844   Acute viral syndrome       Medical Decision Making  I reevaluated the patient, there is no changes from previous provider's note.  PCR's for RSV, influenza, COVID, group A strep were negative.  UA unremarkable revealing no evidence of infection.  Chest x-ray unremarkable with no evidence of PNA.  Given the patient's length of symptoms and physical exam I suspect she is experiencing an acute viral upper respiratory infection.  She was advised to watch her symptoms and return with any new or worsening.  Advised that she follow-up with her PCP within the next week.    As a result of the work-up, the patient was discharged home.  she was informed of her diagnosis, educated on lab and imaging findings, I explained reasons for the patient to return to the Emergency Department and instructed to come back with any concerns or worsening of condition.  she demonstrated verbal understanding and were in agreement with the plan of care.  I emphasized the importance of follow up with her PCP in the timeframe recommended.  she was given the opportunity to ask questions.  All of the patient's questions were answered.  Patient discharged in stable condition.    Final diagnoses:   [B34.9] Acute viral syndrome       Israel Lieberman PA-C

## 2025-04-17 ENCOUNTER — TELEPHONE (OUTPATIENT)
Dept: PRIMARY CARE | Facility: CLINIC | Age: 70
End: 2025-04-17
Payer: COMMERCIAL

## 2025-04-23 ENCOUNTER — HOSPITAL ENCOUNTER (OUTPATIENT)
Dept: RADIOLOGY | Facility: HOSPITAL | Age: 70
Discharge: HOME | End: 2025-04-23
Payer: COMMERCIAL

## 2025-04-23 DIAGNOSIS — N39.0 URINARY TRACT INFECTION WITHOUT HEMATURIA, SITE UNSPECIFIED: ICD-10-CM

## 2025-04-23 DIAGNOSIS — N35.12 POSTINFECTIVE URETHRAL STRICTURE IN FEMALE: ICD-10-CM

## 2025-04-23 DIAGNOSIS — N32.89 BLADDER SPASMS: ICD-10-CM

## 2025-04-23 LAB
APPEARANCE UR: ABNORMAL
BACTERIA #/AREA URNS HPF: ABNORMAL /HPF
BACTERIA UR CULT: NORMAL
BILIRUB UR QL STRIP: NEGATIVE
COLOR UR: YELLOW
GLUCOSE UR QL STRIP: NEGATIVE
HGB UR QL STRIP: ABNORMAL
HYALINE CASTS #/AREA URNS LPF: ABNORMAL /LPF
KETONES UR QL STRIP: NEGATIVE
LEUKOCYTE ESTERASE UR QL STRIP: ABNORMAL
NITRITE UR QL STRIP: NEGATIVE
PH UR STRIP: 5.5 [PH] (ref 5–8)
PROT UR QL STRIP: NEGATIVE
RBC #/AREA URNS HPF: ABNORMAL /HPF
SERVICE CMNT-IMP: ABNORMAL
SP GR UR STRIP: 1.01 (ref 1–1.03)
SQUAMOUS #/AREA URNS HPF: ABNORMAL /HPF
WBC #/AREA URNS HPF: ABNORMAL /HPF

## 2025-04-23 PROCEDURE — 74176 CT ABD & PELVIS W/O CONTRAST: CPT

## 2025-04-23 PROCEDURE — 74176 CT ABD & PELVIS W/O CONTRAST: CPT | Performed by: RADIOLOGY

## 2025-05-04 LAB — BACTERIA UR CULT: NORMAL

## 2025-05-04 RX ORDER — CEPHALEXIN 250 MG/1
250 CAPSULE ORAL EVERY 12 HOURS
Qty: 4 CAPSULE | Refills: 0 | Status: SHIPPED | OUTPATIENT
Start: 2025-05-04 | End: 2025-05-06

## 2025-05-07 ENCOUNTER — APPOINTMENT (OUTPATIENT)
Dept: UROLOGY | Facility: CLINIC | Age: 70
End: 2025-05-07
Payer: COMMERCIAL

## 2025-05-07 VITALS
HEART RATE: 84 BPM | BODY MASS INDEX: 21.72 KG/M2 | SYSTOLIC BLOOD PRESSURE: 147 MMHG | RESPIRATION RATE: 16 BRPM | DIASTOLIC BLOOD PRESSURE: 87 MMHG | WEIGHT: 126.54 LBS

## 2025-05-07 DIAGNOSIS — N39.0 RECURRENT UTI: ICD-10-CM

## 2025-05-07 DIAGNOSIS — N39.0 URINARY TRACT INFECTION WITHOUT HEMATURIA, SITE UNSPECIFIED: ICD-10-CM

## 2025-05-07 DIAGNOSIS — N35.12 POSTINFECTIVE URETHRAL STRICTURE IN FEMALE: ICD-10-CM

## 2025-05-07 DIAGNOSIS — N32.89 BLADDER SPASMS: Primary | ICD-10-CM

## 2025-05-07 LAB
POC APPEARANCE, URINE: CLEAR
POC BILIRUBIN, URINE: NEGATIVE
POC BLOOD, URINE: NEGATIVE
POC COLOR, URINE: YELLOW
POC GLUCOSE, URINE: NEGATIVE MG/DL
POC KETONES, URINE: NEGATIVE MG/DL
POC LEUKOCYTES, URINE: NEGATIVE
POC NITRITE,URINE: NEGATIVE
POC PH, URINE: 7 PH
POC PROTEIN, URINE: NEGATIVE MG/DL
POC SPECIFIC GRAVITY, URINE: 1.01
POC UROBILINOGEN, URINE: 0.2 EU/DL

## 2025-05-07 PROCEDURE — 51798 US URINE CAPACITY MEASURE: CPT | Performed by: UROLOGY

## 2025-05-07 PROCEDURE — 81003 URINALYSIS AUTO W/O SCOPE: CPT | Performed by: UROLOGY

## 2025-05-07 PROCEDURE — 52281 CYSTOSCOPY AND TREATMENT: CPT | Performed by: UROLOGY

## 2025-05-07 PROCEDURE — 99214 OFFICE O/P EST MOD 30 MIN: CPT | Performed by: UROLOGY

## 2025-05-07 NOTE — LETTER
May 7, 2025     Natalie Lombardo MD  [address]  [facility] [address]    Patient: Kaelyn Mohamud   YOB: 1955   Date of Visit: 5/7/2025       Dear Dr. Natalie Lombardo MD:    Thank you for referring Kaelyn Mohamud to me for evaluation. Below are my notes for this consultation.  If you have questions, please do not hesitate to call me. I look forward to following your patient along with you.       Sincerely,     Dale Wagoner MD      CC: No Recipients  ______________________________________________________________________________________      Provider Impressions     70 year-old white female self-referred, previous patient of Dr. Oliver Smallwood, for RECURRENT UTI AND BLADDER SPASMS. Urinalysis is negative for blood. CAT scan of the chest, abdomen and pelvis is negative. Renal bladder ultrasound identifies a 3 mm right RENAL CALCULUS. Urine culture was negative but then a second urine culture with was positive for Klebsiella, UTI. Patient retired in 2018, previously employed in [employer]. No family history of breast or prostate cancer. The patient has never smoked cigarettes.      MACROBID  allergy     April 17, 2023, patient arrives alone. She states she has had Recurrent UTIs with Urgency and Frequency. She states that she has had URETHRAL STRICTURES. We discussed the etiology of recurrent UTIs and poor bladder emptying. She still has her uterus and ovaries. She is sexually active. She takes showers, not tub baths. She does not use a swimming pool, hot tub or bicycle. She has been diagnosed with a URETHRAL STRICTURE in the past. We will initiate cystoscopy with urethral dilatation, flow studies and a discussion of treatment options. She may require an alpha agent. Urine culture was negative as well as urine cytology     May 19, 2023, CYSTOSCOPY WITH URETHRAL DILATATION a 20 Malay, flow studies and a discussion of treatment options. DENSE URETHRAL STRICTURE. Severe  erythema in the lower half of the bladder. We will increase to 22 Romanian and 2 months. Flow rate 9 cc/s PVR 0 cc. Urinalysis and urine culture were negative. We will not add an alpha agent for antibiotic at this time.     August 9, 2023, cystoscopy with urethral dilatation to 20 Romanian. Attempts to 22 were unsuccessful due to pain. However the patient had a recent UTI and we will attempt that again at her next visit. Flow rate 2 cc/s, total volume 17 cc, PVR 0 cc. She states that after her first dilatation she noticed a dramatic improvement in flow and emptying. We will try to continue to increase the size and strongly consider a low-dose daily cephalosporin if necessary.     August 26, 2023, cystoscopy with urethral dilatation to 22 Romanian of a dense urethral stricture upward sloping. Moderate discomfort with no bleeding. Significant erythema in the bladder base and trigone with a very clear line of demarcation. No tumors or stones. Flow rate 2 cc/s, total volume 19 cc, PVR 0 cc. Urinalysis is negative for blood and urine culture no growth. She will return in 4 weeks and we will increase the size to 24 Romanian at patient's request.     September 16, 2023, telephone call, urine culture was positive for E. coli treated with Bactrim     September 23, 2023, cystoscopy with dilatation of a dense urethral stricture to 24 Romanian with moderate bleeding and moderate pain. Erythema confined to the bladder base and trigone. We will not increase the size as this appears to be her limit. . I have added Bactrim single strength daily at this time.. She will return in 4 weeks and then we will increase times weekly     December 16, 2023, cystoscopy with dilatation of a dense urethral stricture to 24 Romanian with minimal bleeding and moderate pain. Erythema confined to the trigone. We will not increase the size as this appears to be her limit. . I have added Bactrim single strength daily at this time. She will return in 5 weeks and  "then we will increase times weekly     January 20, 2024, telephone call, urine culture is positive for E. coli.  Keflex ordered.  Resistant to Bactrim.     January 23, 2024, cystoscopy with dilatation of a dense urethral stricture to 24 Somali with minimal pain and minimal bleeding.  Once again erythema confined to the trigone.  24 Somali is the patient's limit.  We will change her daily Bactrim to Keflex as it was resistant to her most recent E. coli infection.  We will expand to 6-week regimen.     March 5, 2024, cystoscopy with dilatation of a dense upward sloping urethral stricture to 24 Somali with moderate discomfort.  Erythema confined to the bladder base and trigone.  The patient cannot tolerate dilation larger than 24 Somali.  She maintains her daily Keflex.  We will expand to 7 weeks.     April 23, 2024, cystoscopy with dilatation of a dense upward sloping urethral stricture to 24 Somali with minimal discomfort.  Erythema again in the bladder base and trigone.  This patient cannot tolerate dilatation larger than the 24 Somali size.  We will transition her from daily Keflex to twice a week Keflex at the 500 mg dose.  We will expand her regimen to 8 weeks.  She wishes to continue as she has less frequency, less urgency, and less nocturia.  Renal colic CAT scan does not identify any stones in the kidneys or ureters.  Urine culture no growth.  Urinalysis was negative for blood.     August 16, 2024, patient arrives alone.  As we have discussed before, the patient was in control regarding continuation of dilatation.  She felt that she no longer had frequency, urgency or nocturia and discontinued plans for any further urethral dilatation.  She returns today complaining of urinary frequency, urinary urgency, and that her urine is \"blocked\" when she is trying to urinate.  She states that she is now straining to urinate and wishes to reinitiate a schedule once again.  She continues on Keflex twice a week 500 mg.   "   September 9, 2024, cystoscopy with urethral dilatation to 24 Libyan with moderate pain and no bleeding.  Erythema posteriorly.  Dense upward sloping urethral stricture.  She would like to start her regimen again and will return in 4 weeks.  PVR of 83 cc.  She continues on Keflex 500 mg twice a week.  Urinalysis was negative for blood and urine culture no growth.     October 9, 2024, cystoscopy with urethral dilatation to 24 Libyan with minimal pain and no bleeding.  Mild erythema in the bladder base.  Dense upward sloping urethral stricture.  No further straining for urination, no further urinary frequency and no further postvoid dribbling.  PVR of 16 cc.  We will expand her regimen to 5 weeks.  Urinalysis was negative for blood and urine culture no growth.  Continues on Keflex 500 mg twice a week.     January 4, 2025, cystoscopy with urethral dilatation of a dense upward sloping urethral stricture to 24 Libyan with minimal discomfort and no bleeding.  Erythema is significant posteriorly.  Patient is quite happy with her regimen and no longer experiences straining with urination, urinary frequency or postvoid dribbling.  PVR of 18 cc.  She continues on Keflex 500 mg twice a week.  Urinalysis was negative for blood and urine culture no growth.  She was delayed for this procedure due to a bout of diverticulitis which resulted in a bowel perforation.  She will return in 6 weeks.     March 19, 2025, cystoscopy with urethral dilatation of an upward sloping dense urethral stricture to 24 Libyan with minimal discomfort and no bleeding.  Erythema is confined to the bladder base and trigone with a clear line of demarcation.  PVR of 24 cc.  She is very happy with the regimen no longer experiencing straining with urination, urinary frequency or postvoid dribbling.  She also was not having repeat UTIs.  She is on Keflex 500 mg twice a week.  Her general surgeon, Dr. Sanjay Gomez, has recommended discontinuing that  medication.  Urinalysis is negative for blood and urine culture no growth.  We will expand to 7 weeks.    May 7, 2025, cystoscopy with urethral dilatation of a dense upward sloping urethral stricture to 24 Colombian.  Minimal pain and no bleeding.  Erythema confined to the bladder base.  PVR 23 cc.  Patient is quite pleased with her regimen.  She no longer experiences straining with urination, urinary frequency or postvoid dribbling.  She also has no longer had repeat UTIs.  Urinalysis is negative for blood.  Urine culture no growth.  Renal colic CAT scan does not identify any stones.  Previously described stranding in the mesentery in the right lower quadrant has nearly completely resolved.  However there is been an interval development of a mildly prominent right lower quadrant mesenteric node measuring 1.1 cm.  Most likely reactive.  Follow-up recommended as before.  We will expand to 8 weeks.     PLAN:     1. The patient will return in 8 weeks  INCREASE TIME PERIODS WEEKLY. for cystoscopy with urethral dilatation to 24 Colombian, PVR and a URINALYSIS AND URINE CULTURE. She will receive PROPHYLAXIS WITH KEFLEX 250 mg p.o. every 12 hours for 4 doses. We will HOLD the size at 24 Colombian       3.  In April 2026, cystoscopy with dilatation to 24 Colombian, PVR, urinalysis, urine culture, renal colic CAT scan     Physical Exam  Vitals and nursing note reviewed. Exam conducted with a chaperone present.   Constitutional:       Appearance: Normal appearance.   HENT:      Head: Normocephalic and atraumatic.   Pulmonary:      Effort: Pulmonary effort is normal.   Abdominal:      Palpations: Abdomen is soft.      Tenderness: There is no abdominal tenderness.   Genitourinary:     General: Normal vulva.      Vagina: No vaginal discharge.   Musculoskeletal:         General: Normal range of motion.      Cervical back: Normal range of motion and neck supple.   Neurological:      General: No focal deficit present.      Mental Status: She  "is alert and oriented to person, place, and time.   Psychiatric:         Mood and Affect: Mood normal.         Behavior: Behavior normal.        This note was created with voice-recognition software and was not corrected for typographical or grammatical errors     Patient ID: Kaelyn Mohamud is a 70 y.o. female.    Procedures  Pt took Keflex 250mg po as prescribed  Anesthesia: Local 2% Lidocaine  Instruments: 6F flexible disposable Cystoscope, female sounds     Pt brought to procedure room and placed in dorsal lithotomy position. Pt draped and prepped in normal sterile fashion. 5ml lidocaine instilled into urethral meatus and 5ml instilled into urethra (urethra). Pt tolerated well.     I was present as chaperone for the entirety of the procedure   Eliza Mai  Cystoscopy performed by Dr. Dale Wagoner        Bedside \"Time Out\" Verification   Today's Date: 05/07/2025 I attest that this time out verification took place prior to the procedure.   Procedure: Cysto/dil  RN/LPN/MA: KAREN   Provider: [facility].   Verified By: MA and Provider, Dr. Dale Wagoner.   Prior to the start of the procedure a time out was taken and the following were verified: the identity of the patient using two patient identifiers, the correct procedure, the correct site marked as indicated, the correct positioning for the patient and the correct equipment was obtained.   Cystoscopy - Kaelyn Mohamud   identified using two (2) forms of identification.   Procedure: diagnostic Cystourethroscopy   Procedure Note: Time Started: 3:00pm  Time Completed: 3:18 PM  Indications for procedure: Cysto/Dil 24F- Urethral Stricture      Discussed with patient: Risks, benefits, and alternative were discussed in detail. Patient appears to understand and agrees to proceed. Patient has signed the procedure consent form.    CYSTOSCOPY:    Cystoscopy today reveals a dense upward sloping urethral stricture dilated to 24 Danish with minimal discomfort and no bleeding.  " Once inside the bladder, erythema is confined to the bladder base.  PVR 32 cc.

## 2025-05-07 NOTE — PROGRESS NOTES
"Patient ID: Kaelyn Mohamud is a 70 y.o. female.    Procedures  Pt took Keflex 250mg po as prescribed  Anesthesia: Local 2% Lidocaine  Instruments: 6F flexible disposable Cystoscope, female sounds     Pt brought to procedure room and placed in dorsal lithotomy position. Pt draped and prepped in normal sterile fashion. 5ml lidocaine instilled into urethral meatus and 5ml instilled into urethra (urethra). Pt tolerated well.     I was present as chaperone for the entirety of the procedure   Eliza Mai  Cystoscopy performed by Dr. Dale Wagoner        Bedside \"Time Out\" Verification   Today's Date: 05/07/2025 I attest that this time out verification took place prior to the procedure.   Procedure: Cysto/dil  RN/LPN/MA: KAREN   Provider: [facility].   Verified By: MA and Provider, Dr. Dale Wagoner.   Prior to the start of the procedure a time out was taken and the following were verified: the identity of the patient using two patient identifiers, the correct procedure, the correct site marked as indicated, the correct positioning for the patient and the correct equipment was obtained.   Cystoscopy - Kaelyn Mohamud   identified using two (2) forms of identification.   Procedure: diagnostic Cystourethroscopy   Procedure Note: Time Started: 3:00pm  Time Completed: 3:18 PM  Indications for procedure: Cysto/Dil 24F- Urethral Stricture      Discussed with patient: Risks, benefits, and alternative were discussed in detail. Patient appears to understand and agrees to proceed. Patient has signed the procedure consent form.    CYSTOSCOPY:    Cystoscopy today reveals a dense upward sloping urethral stricture dilated to 24 Thai with minimal discomfort and no bleeding.  Once inside the bladder, erythema is confined to the bladder base.  PVR 32 cc.     "

## 2025-05-07 NOTE — PATIENT INSTRUCTIONS
Patient Discussion/Summary     It was very nice to see you again today. We had a long discussion regarding recurrent urinary tract infections and urethral strictures. Today you were dilated to 24 Spanish with moderate discomfort.  Urine studies did not show any infection.  Your CAT scan did not show any stone disease.  We will restart the regimen and I will see you again in 8 weeks.      This note was created with voice-recognition software and was not corrected for typographical or grammatical errors

## 2025-05-07 NOTE — PROGRESS NOTES
Provider Impressions     70 year-old white female self-referred, previous patient of Dr. Oliver Smallwood, for RECURRENT UTI AND BLADDER SPASMS. Urinalysis is negative for blood. CAT scan of the chest, abdomen and pelvis is negative. Renal bladder ultrasound identifies a 3 mm right RENAL CALCULUS. Urine culture was negative but then a second urine culture with was positive for Klebsiella, UTI. Patient retired in 2018, previously employed in [employer]. No family history of breast or prostate cancer. The patient has never smoked cigarettes.      MACROBID  allergy     April 17, 2023, patient arrives alone. She states she has had Recurrent UTIs with Urgency and Frequency. She states that she has had URETHRAL STRICTURES. We discussed the etiology of recurrent UTIs and poor bladder emptying. She still has her uterus and ovaries. She is sexually active. She takes showers, not tub baths. She does not use a swimming pool, hot tub or bicycle. She has been diagnosed with a URETHRAL STRICTURE in the past. We will initiate cystoscopy with urethral dilatation, flow studies and a discussion of treatment options. She may require an alpha agent. Urine culture was negative as well as urine cytology     May 19, 2023, CYSTOSCOPY WITH URETHRAL DILATATION a 20 Armenian, flow studies and a discussion of treatment options. DENSE URETHRAL STRICTURE. Severe erythema in the lower half of the bladder. We will increase to 22 Armenian and 2 months. Flow rate 9 cc/s PVR 0 cc. Urinalysis and urine culture were negative. We will not add an alpha agent for antibiotic at this time.     August 9, 2023, cystoscopy with urethral dilatation to 20 Armenian. Attempts to 22 were unsuccessful due to pain. However the patient had a recent UTI and we will attempt that again at her next visit. Flow rate 2 cc/s, total volume 17 cc, PVR 0 cc. She states that after her first dilatation she noticed a dramatic improvement in flow and emptying. We will try to continue  to increase the size and strongly consider a low-dose daily cephalosporin if necessary.     August 26, 2023, cystoscopy with urethral dilatation to 22 Moroccan of a dense urethral stricture upward sloping. Moderate discomfort with no bleeding. Significant erythema in the bladder base and trigone with a very clear line of demarcation. No tumors or stones. Flow rate 2 cc/s, total volume 19 cc, PVR 0 cc. Urinalysis is negative for blood and urine culture no growth. She will return in 4 weeks and we will increase the size to 24 Moroccan at patient's request.     September 16, 2023, telephone call, urine culture was positive for E. coli treated with Bactrim     September 23, 2023, cystoscopy with dilatation of a dense urethral stricture to 24 Moroccan with moderate bleeding and moderate pain. Erythema confined to the bladder base and trigone. We will not increase the size as this appears to be her limit. . I have added Bactrim single strength daily at this time.. She will return in 4 weeks and then we will increase times weekly     December 16, 2023, cystoscopy with dilatation of a dense urethral stricture to 24 Moroccan with minimal bleeding and moderate pain. Erythema confined to the trigone. We will not increase the size as this appears to be her limit. . I have added Bactrim single strength daily at this time. She will return in 5 weeks and then we will increase times weekly     January 20, 2024, telephone call, urine culture is positive for E. coli.  Keflex ordered.  Resistant to Bactrim.     January 23, 2024, cystoscopy with dilatation of a dense urethral stricture to 24 Moroccan with minimal pain and minimal bleeding.  Once again erythema confined to the trigone.  24 Moroccan is the patient's limit.  We will change her daily Bactrim to Keflex as it was resistant to her most recent E. coli infection.  We will expand to 6-week regimen.     March 5, 2024, cystoscopy with dilatation of a dense upward sloping urethral stricture to  "24 Angolan with moderate discomfort.  Erythema confined to the bladder base and trigone.  The patient cannot tolerate dilation larger than 24 Angolan.  She maintains her daily Keflex.  We will expand to 7 weeks.     April 23, 2024, cystoscopy with dilatation of a dense upward sloping urethral stricture to 24 Angolan with minimal discomfort.  Erythema again in the bladder base and trigone.  This patient cannot tolerate dilatation larger than the 24 Angolan size.  We will transition her from daily Keflex to twice a week Keflex at the 500 mg dose.  We will expand her regimen to 8 weeks.  She wishes to continue as she has less frequency, less urgency, and less nocturia.  Renal colic CAT scan does not identify any stones in the kidneys or ureters.  Urine culture no growth.  Urinalysis was negative for blood.     August 16, 2024, patient arrives alone.  As we have discussed before, the patient was in control regarding continuation of dilatation.  She felt that she no longer had frequency, urgency or nocturia and discontinued plans for any further urethral dilatation.  She returns today complaining of urinary frequency, urinary urgency, and that her urine is \"blocked\" when she is trying to urinate.  She states that she is now straining to urinate and wishes to reinitiate a schedule once again.  She continues on Keflex twice a week 500 mg.     September 9, 2024, cystoscopy with urethral dilatation to 24 Angolan with moderate pain and no bleeding.  Erythema posteriorly.  Dense upward sloping urethral stricture.  She would like to start her regimen again and will return in 4 weeks.  PVR of 83 cc.  She continues on Keflex 500 mg twice a week.  Urinalysis was negative for blood and urine culture no growth.     October 9, 2024, cystoscopy with urethral dilatation to 24 Angolan with minimal pain and no bleeding.  Mild erythema in the bladder base.  Dense upward sloping urethral stricture.  No further straining for urination, no further " urinary frequency and no further postvoid dribbling.  PVR of 16 cc.  We will expand her regimen to 5 weeks.  Urinalysis was negative for blood and urine culture no growth.  Continues on Keflex 500 mg twice a week.     January 4, 2025, cystoscopy with urethral dilatation of a dense upward sloping urethral stricture to 24 Norwegian with minimal discomfort and no bleeding.  Erythema is significant posteriorly.  Patient is quite happy with her regimen and no longer experiences straining with urination, urinary frequency or postvoid dribbling.  PVR of 18 cc.  She continues on Keflex 500 mg twice a week.  Urinalysis was negative for blood and urine culture no growth.  She was delayed for this procedure due to a bout of diverticulitis which resulted in a bowel perforation.  She will return in 6 weeks.     March 19, 2025, cystoscopy with urethral dilatation of an upward sloping dense urethral stricture to 24 Norwegian with minimal discomfort and no bleeding.  Erythema is confined to the bladder base and trigone with a clear line of demarcation.  PVR of 24 cc.  She is very happy with the regimen no longer experiencing straining with urination, urinary frequency or postvoid dribbling.  She also was not having repeat UTIs.  She is on Keflex 500 mg twice a week.  Her general surgeon, Dr. Sanjay Gomez, has recommended discontinuing that medication.  Urinalysis is negative for blood and urine culture no growth.  We will expand to 7 weeks.    May 7, 2025, cystoscopy with urethral dilatation of a dense upward sloping urethral stricture to 24 Norwegian.  Minimal pain and no bleeding.  Erythema confined to the bladder base.  PVR 23 cc.  Patient is quite pleased with her regimen.  She no longer experiences straining with urination, urinary frequency or postvoid dribbling.  She also has no longer had repeat UTIs.  Urinalysis is negative for blood.  Urine culture no growth.  Renal colic CAT scan does not identify any stones.  Previously  described stranding in the mesentery in the right lower quadrant has nearly completely resolved.  However there is been an interval development of a mildly prominent right lower quadrant mesenteric node measuring 1.1 cm.  Most likely reactive.  Follow-up recommended as before.  We will expand to 8 weeks.     PLAN:     1. The patient will return in 8 weeks  INCREASE TIME PERIODS WEEKLY. for cystoscopy with urethral dilatation to 24 Malaysian, PVR and a URINALYSIS AND URINE CULTURE. She will receive PROPHYLAXIS WITH KEFLEX 250 mg p.o. every 12 hours for 4 doses. We will HOLD the size at 24 Malaysian       3.  In April 2026, cystoscopy with dilatation to 24 Malaysian, PVR, urinalysis, urine culture, renal colic CAT scan     Physical Exam  Vitals and nursing note reviewed. Exam conducted with a chaperone present.   Constitutional:       Appearance: Normal appearance.   HENT:      Head: Normocephalic and atraumatic.   Pulmonary:      Effort: Pulmonary effort is normal.   Abdominal:      Palpations: Abdomen is soft.      Tenderness: There is no abdominal tenderness.   Genitourinary:     General: Normal vulva.      Vagina: No vaginal discharge.   Musculoskeletal:         General: Normal range of motion.      Cervical back: Normal range of motion and neck supple.   Neurological:      General: No focal deficit present.      Mental Status: She is alert and oriented to person, place, and time.   Psychiatric:         Mood and Affect: Mood normal.         Behavior: Behavior normal.        This note was created with voice-recognition software and was not corrected for typographical or grammatical errors

## 2025-05-28 ENCOUNTER — TELEPHONE (OUTPATIENT)
Dept: PRIMARY CARE | Facility: CLINIC | Age: 70
End: 2025-05-28

## 2025-05-28 ENCOUNTER — OFFICE VISIT (OUTPATIENT)
Dept: PRIMARY CARE | Facility: CLINIC | Age: 70
End: 2025-05-28
Payer: COMMERCIAL

## 2025-05-28 VITALS
DIASTOLIC BLOOD PRESSURE: 86 MMHG | WEIGHT: 126 LBS | TEMPERATURE: 97.8 F | HEART RATE: 64 BPM | BODY MASS INDEX: 21.51 KG/M2 | SYSTOLIC BLOOD PRESSURE: 134 MMHG | HEIGHT: 64 IN

## 2025-05-28 DIAGNOSIS — R03.0 ELEVATED BLOOD PRESSURE READING: ICD-10-CM

## 2025-05-28 DIAGNOSIS — R25.2 LEG CRAMPS: ICD-10-CM

## 2025-05-28 DIAGNOSIS — I10 PRIMARY HYPERTENSION: Primary | ICD-10-CM

## 2025-05-28 DIAGNOSIS — E78.2 MIXED HYPERLIPIDEMIA: ICD-10-CM

## 2025-05-28 PROCEDURE — 99213 OFFICE O/P EST LOW 20 MIN: CPT | Performed by: INTERNAL MEDICINE

## 2025-05-28 PROCEDURE — 3008F BODY MASS INDEX DOCD: CPT | Performed by: INTERNAL MEDICINE

## 2025-05-28 PROCEDURE — G2211 COMPLEX E/M VISIT ADD ON: HCPCS | Performed by: INTERNAL MEDICINE

## 2025-05-28 PROCEDURE — 1159F MED LIST DOCD IN RCRD: CPT | Performed by: INTERNAL MEDICINE

## 2025-05-28 PROCEDURE — 1036F TOBACCO NON-USER: CPT | Performed by: INTERNAL MEDICINE

## 2025-05-28 PROCEDURE — 3074F SYST BP LT 130 MM HG: CPT | Performed by: INTERNAL MEDICINE

## 2025-05-28 PROCEDURE — 3078F DIAST BP <80 MM HG: CPT | Performed by: INTERNAL MEDICINE

## 2025-05-28 RX ORDER — AMLODIPINE BESYLATE 2.5 MG/1
2.5 TABLET ORAL DAILY
Qty: 30 TABLET | Refills: 5 | Status: SHIPPED | OUTPATIENT
Start: 2025-05-28 | End: 2025-11-24

## 2025-05-28 ASSESSMENT — ENCOUNTER SYMPTOMS
EYES NEGATIVE: 1
CONSTITUTIONAL NEGATIVE: 1
RESPIRATORY NEGATIVE: 1
ALLERGIC/IMMUNOLOGIC NEGATIVE: 1
ENDOCRINE NEGATIVE: 1
HEMATOLOGIC/LYMPHATIC NEGATIVE: 1
NEUROLOGICAL NEGATIVE: 1

## 2025-05-28 NOTE — PROGRESS NOTES
"Subjective   Patient ID: Kaelyn Mohamud is a 70 y.o. female who presents for acute  (Pt here for elevated  BP and bilateral leg cramping rt worse then left. Pt stated she noticed the symptoms started after she began taking the levothyroxine approx couple months ago ).    HPI patient here for follow-up she feels like her blood pressure has gone higher for us it was in normal range.  Also feels like cramps in her legs which are most likely secondary to statins.    Review of Systems   Constitutional: Negative.    HENT: Negative.     Eyes: Negative.    Respiratory: Negative.     Endocrine: Negative.    Genitourinary: Negative.    Allergic/Immunologic: Negative.    Neurological: Negative.    Hematological: Negative.    All other systems reviewed and are negative.      Objective   /72   Pulse 64   Temp 36.6 °C (97.8 °F) (Temporal)   Ht 1.626 m (5' 4\")   Wt 57.2 kg (126 lb)   BMI 21.63 kg/m²     Physical Exam  Constitutional:       Appearance: Normal appearance.   Cardiovascular:      Rate and Rhythm: Normal rate and regular rhythm.   Neurological:      Mental Status: She is alert.       Assessment/Plan   Problem List Items Addressed This Visit           ICD-10-CM    Elevated blood pressure reading R03.0    Hyperlipidemia E78.5    Leg cramps R25.2     Other Visit Diagnoses         Codes      Primary hypertension    -  Primary I10    Relevant Medications    amLODIPine (Norvasc) 2.5 mg tablet        Advised to follow DASH diet monitor blood pressure take amlodipine if systolic blood pressure is above 140.  Regarding leg cramps there is secondary to statins patient advised to hold statins for a month to see if that makes a difference.       "

## 2025-06-15 DIAGNOSIS — F41.9 ANXIETY: ICD-10-CM

## 2025-06-16 RX ORDER — HYDROXYZINE PAMOATE 25 MG/1
25 CAPSULE ORAL DAILY PRN
Qty: 30 CAPSULE | Refills: 1 | Status: SHIPPED | OUTPATIENT
Start: 2025-06-16

## 2025-07-08 ENCOUNTER — APPOINTMENT (OUTPATIENT)
Dept: UROLOGY | Facility: CLINIC | Age: 70
End: 2025-07-08
Payer: COMMERCIAL

## 2025-07-12 LAB
APPEARANCE UR: CLEAR
BACTERIA #/AREA URNS HPF: ABNORMAL /HPF
BACTERIA UR CULT: ABNORMAL
BILIRUB UR QL STRIP: NEGATIVE
CAOX CRY #/AREA URNS HPF: ABNORMAL /HPF
COLOR UR: YELLOW
GLUCOSE UR QL STRIP: NEGATIVE
HGB UR QL STRIP: NEGATIVE
HYALINE CASTS #/AREA URNS LPF: ABNORMAL /LPF
KETONES UR QL STRIP: NEGATIVE
LEUKOCYTE ESTERASE UR QL STRIP: ABNORMAL
NITRITE UR QL STRIP: POSITIVE
PH UR STRIP: 5.5 [PH] (ref 5–8)
PROT UR QL STRIP: NEGATIVE
RBC #/AREA URNS HPF: ABNORMAL /HPF
SERVICE CMNT-IMP: ABNORMAL
SP GR UR STRIP: 1.02 (ref 1–1.03)
SQUAMOUS #/AREA URNS HPF: ABNORMAL /HPF
WBC #/AREA URNS HPF: ABNORMAL /HPF

## 2025-07-18 ENCOUNTER — APPOINTMENT (OUTPATIENT)
Dept: PRIMARY CARE | Facility: CLINIC | Age: 70
End: 2025-07-18
Payer: COMMERCIAL

## 2025-07-21 RX ORDER — CEPHALEXIN 250 MG/1
250 CAPSULE ORAL EVERY 12 HOURS
Qty: 4 CAPSULE | Refills: 0 | Status: SHIPPED | OUTPATIENT
Start: 2025-07-21 | End: 2025-07-29

## 2025-07-29 ENCOUNTER — APPOINTMENT (OUTPATIENT)
Dept: PRIMARY CARE | Facility: CLINIC | Age: 70
End: 2025-07-29
Payer: COMMERCIAL

## 2025-07-29 VITALS
SYSTOLIC BLOOD PRESSURE: 128 MMHG | HEART RATE: 74 BPM | TEMPERATURE: 96.7 F | BODY MASS INDEX: 21.85 KG/M2 | HEIGHT: 64 IN | DIASTOLIC BLOOD PRESSURE: 78 MMHG | OXYGEN SATURATION: 100 % | WEIGHT: 128 LBS

## 2025-07-29 DIAGNOSIS — F41.9 ANXIETY AND DEPRESSION: Primary | ICD-10-CM

## 2025-07-29 DIAGNOSIS — D75.839 THROMBOCYTOSIS: ICD-10-CM

## 2025-07-29 DIAGNOSIS — Z12.31 SCREENING MAMMOGRAM FOR BREAST CANCER: ICD-10-CM

## 2025-07-29 DIAGNOSIS — E78.2 MIXED HYPERLIPIDEMIA: ICD-10-CM

## 2025-07-29 DIAGNOSIS — F32.A ANXIETY AND DEPRESSION: Primary | ICD-10-CM

## 2025-07-29 DIAGNOSIS — E03.9 HYPOTHYROIDISM, UNSPECIFIED TYPE: ICD-10-CM

## 2025-07-29 PROCEDURE — 99214 OFFICE O/P EST MOD 30 MIN: CPT | Performed by: INTERNAL MEDICINE

## 2025-07-29 PROCEDURE — 3008F BODY MASS INDEX DOCD: CPT | Performed by: INTERNAL MEDICINE

## 2025-07-29 PROCEDURE — 1159F MED LIST DOCD IN RCRD: CPT | Performed by: INTERNAL MEDICINE

## 2025-07-29 PROCEDURE — 1036F TOBACCO NON-USER: CPT | Performed by: INTERNAL MEDICINE

## 2025-07-29 PROCEDURE — 1160F RVW MEDS BY RX/DR IN RCRD: CPT | Performed by: INTERNAL MEDICINE

## 2025-07-29 PROCEDURE — G2211 COMPLEX E/M VISIT ADD ON: HCPCS | Performed by: INTERNAL MEDICINE

## 2025-07-29 ASSESSMENT — ENCOUNTER SYMPTOMS
HEMATOLOGIC/LYMPHATIC NEGATIVE: 1
CARDIOVASCULAR NEGATIVE: 1
NEUROLOGICAL NEGATIVE: 1
GASTROINTESTINAL NEGATIVE: 1
ENDOCRINE NEGATIVE: 1
EYES NEGATIVE: 1
RESPIRATORY NEGATIVE: 1
ALLERGIC/IMMUNOLOGIC NEGATIVE: 1
CONSTITUTIONAL NEGATIVE: 1

## 2025-07-29 ASSESSMENT — COLUMBIA-SUICIDE SEVERITY RATING SCALE - C-SSRS
6. HAVE YOU EVER DONE ANYTHING, STARTED TO DO ANYTHING, OR PREPARED TO DO ANYTHING TO END YOUR LIFE?: NO
2. HAVE YOU ACTUALLY HAD ANY THOUGHTS OF KILLING YOURSELF?: NO
1. IN THE PAST MONTH, HAVE YOU WISHED YOU WERE DEAD OR WISHED YOU COULD GO TO SLEEP AND NOT WAKE UP?: NO

## 2025-07-29 NOTE — PROGRESS NOTES
"Subjective   Patient ID: Kaelyn Mohamud is a 70 y.o. female who presents for Follow-up (Pt here for office visit ).    HPI patient is here for follow-up.  She has a history of hyperlipidemia she is not taking statin because of muscle side effects she also has hypertension hypothyroidism she is not taking amlodipine because her blood pressure is running normal.  She is on Zoloft for anxiety depression.    Review of Systems   Constitutional: Negative.    HENT: Negative.     Eyes: Negative.    Respiratory: Negative.     Cardiovascular: Negative.    Gastrointestinal: Negative.    Endocrine: Negative.    Genitourinary: Negative.    Allergic/Immunologic: Negative.    Neurological: Negative.    Hematological: Negative.    Psychiatric/Behavioral:          Depression   All other systems reviewed and are negative.      Objective   /78   Pulse 74   Temp 35.9 °C (96.7 °F) (Temporal)   Ht 1.626 m (5' 4\")   Wt 58.1 kg (128 lb)   SpO2 100%   BMI 21.97 kg/m²     Physical Exam  Vitals reviewed.   Constitutional:       Appearance: Normal appearance.   HENT:      Head: Normocephalic.   Neck:      Vascular: No carotid bruit.     Cardiovascular:      Rate and Rhythm: Normal rate and regular rhythm.      Pulses: Normal pulses.      Heart sounds: Normal heart sounds.   Pulmonary:      Effort: Pulmonary effort is normal.      Breath sounds: Normal breath sounds.     Musculoskeletal:      Right lower leg: No edema.      Left lower leg: No edema.   Lymphadenopathy:      Cervical: No cervical adenopathy.     Neurological:      General: No focal deficit present.      Mental Status: She is alert and oriented to person, place, and time. Mental status is at baseline.      Cranial Nerves: No cranial nerve deficit.         Assessment/Plan   Problem List Items Addressed This Visit           ICD-10-CM    Anxiety and depression - Primary F41.9, F32.A    Hyperlipidemia E78.5    Relevant Orders    Comprehensive Metabolic Panel    Lipid " Panel    Thrombocytosis D75.839    Relevant Orders    CBC and Auto Differential     Other Visit Diagnoses         Codes      Screening mammogram for breast cancer     Z12.31    Relevant Orders    BI mammo bilateral screening tomosynthesis      Hypothyroidism, unspecified type     E03.9    Relevant Orders    TSH with reflex to Free T4 if abnormal        Blood work ordered mammogram ordered.  She does have history of urethral stricture and follows up with urology.  Continue sertraline for anxiety depression.  Lipid levels will be checked since she is not on statins.  Also CBC will be checked as she has history of thrombocytosis.  Continue levothyroxine for hypothyroidism.  Cyclobenzaprine renewed for low back pains for muscle spasms

## 2025-07-30 ENCOUNTER — APPOINTMENT (OUTPATIENT)
Dept: UROLOGY | Facility: CLINIC | Age: 70
End: 2025-07-30
Payer: COMMERCIAL

## 2025-07-30 VITALS
RESPIRATION RATE: 16 BRPM | WEIGHT: 129.19 LBS | BODY MASS INDEX: 22.18 KG/M2 | SYSTOLIC BLOOD PRESSURE: 142 MMHG | DIASTOLIC BLOOD PRESSURE: 93 MMHG | HEART RATE: 88 BPM

## 2025-07-30 DIAGNOSIS — N32.89 BLADDER SPASMS: ICD-10-CM

## 2025-07-30 DIAGNOSIS — N39.0 URINARY TRACT INFECTION WITHOUT HEMATURIA, SITE UNSPECIFIED: ICD-10-CM

## 2025-07-30 DIAGNOSIS — N35.12 POSTINFECTIVE URETHRAL STRICTURE IN FEMALE: Primary | ICD-10-CM

## 2025-07-30 LAB
POC APPEARANCE, URINE: CLEAR
POC BILIRUBIN, URINE: NEGATIVE
POC BLOOD, URINE: NEGATIVE
POC COLOR, URINE: ABNORMAL
POC GLUCOSE, URINE: NEGATIVE MG/DL
POC KETONES, URINE: NEGATIVE MG/DL
POC LEUKOCYTES, URINE: ABNORMAL
POC NITRITE,URINE: NEGATIVE
POC PH, URINE: 7.5 PH
POC PROTEIN, URINE: NEGATIVE MG/DL
POC SPECIFIC GRAVITY, URINE: 1.01
POC UROBILINOGEN, URINE: 0.2 EU/DL

## 2025-07-30 PROCEDURE — 51798 US URINE CAPACITY MEASURE: CPT | Performed by: UROLOGY

## 2025-07-30 PROCEDURE — 52281 CYSTOSCOPY AND TREATMENT: CPT | Performed by: UROLOGY

## 2025-07-30 PROCEDURE — 81003 URINALYSIS AUTO W/O SCOPE: CPT | Performed by: UROLOGY

## 2025-07-30 NOTE — PROGRESS NOTES
Provider Impressions     70 year-old white female self-referred, previous patient of Dr. Oliver Smallwood, for RECURRENT UTI AND BLADDER SPASMS. Urinalysis is negative for blood. CAT scan of the chest, abdomen and pelvis is negative. Renal bladder ultrasound identifies a 3 mm right RENAL CALCULUS. Urine culture was negative but then a second urine culture with was positive for Klebsiella, UTI. Patient retired in 2018, previously employed in [employer]. No family history of breast or prostate cancer. The patient has never smoked cigarettes.      MACROBID  allergy     April 17, 2023, patient arrives alone. She states she has had Recurrent UTIs with Urgency and Frequency. She states that she has had URETHRAL STRICTURES. We discussed the etiology of recurrent UTIs and poor bladder emptying. She still has her uterus and ovaries. She is sexually active. She takes showers, not tub baths. She does not use a swimming pool, hot tub or bicycle. She has been diagnosed with a URETHRAL STRICTURE in the past. We will initiate cystoscopy with urethral dilatation, flow studies and a discussion of treatment options. She may require an alpha agent. Urine culture was negative as well as urine cytology     May 19, 2023, CYSTOSCOPY WITH URETHRAL DILATATION a 20 Welsh, flow studies and a discussion of treatment options. DENSE URETHRAL STRICTURE. Severe erythema in the lower half of the bladder. We will increase to 22 Welsh and 2 months. Flow rate 9 cc/s PVR 0 cc. Urinalysis and urine culture were negative. We will not add an alpha agent for antibiotic at this time.     August 9, 2023, cystoscopy with urethral dilatation to 20 Welsh. Attempts to 22 were unsuccessful due to pain. However the patient had a recent UTI and we will attempt that again at her next visit. Flow rate 2 cc/s, total volume 17 cc, PVR 0 cc. She states that after her first dilatation she noticed a dramatic improvement in flow and emptying. We will try to continue  to increase the size and strongly consider a low-dose daily cephalosporin if necessary.     August 26, 2023, cystoscopy with urethral dilatation to 22 Tajik of a dense urethral stricture upward sloping. Moderate discomfort with no bleeding. Significant erythema in the bladder base and trigone with a very clear line of demarcation. No tumors or stones. Flow rate 2 cc/s, total volume 19 cc, PVR 0 cc. Urinalysis is negative for blood and urine culture no growth. She will return in 4 weeks and we will increase the size to 24 Tajik at patient's request.     September 16, 2023, telephone call, urine culture was positive for E. coli treated with Bactrim     September 23, 2023, cystoscopy with dilatation of a dense urethral stricture to 24 Tajik with moderate bleeding and moderate pain. Erythema confined to the bladder base and trigone. We will not increase the size as this appears to be her limit. . I have added Bactrim single strength daily at this time.. She will return in 4 weeks and then we will increase times weekly     December 16, 2023, cystoscopy with dilatation of a dense urethral stricture to 24 Tajik with minimal bleeding and moderate pain. Erythema confined to the trigone. We will not increase the size as this appears to be her limit. . I have added Bactrim single strength daily at this time. She will return in 5 weeks and then we will increase times weekly     January 20, 2024, telephone call, urine culture is positive for E. coli.  Keflex ordered.  Resistant to Bactrim.     January 23, 2024, cystoscopy with dilatation of a dense urethral stricture to 24 Tajik with minimal pain and minimal bleeding.  Once again erythema confined to the trigone.  24 Tajik is the patient's limit.  We will change her daily Bactrim to Keflex as it was resistant to her most recent E. coli infection.  We will expand to 6-week regimen.     March 5, 2024, cystoscopy with dilatation of a dense upward sloping urethral stricture to  "24 South African with moderate discomfort.  Erythema confined to the bladder base and trigone.  The patient cannot tolerate dilation larger than 24 South African.  She maintains her daily Keflex.  We will expand to 7 weeks.     April 23, 2024, cystoscopy with dilatation of a dense upward sloping urethral stricture to 24 South African with minimal discomfort.  Erythema again in the bladder base and trigone.  This patient cannot tolerate dilatation larger than the 24 South African size.  We will transition her from daily Keflex to twice a week Keflex at the 500 mg dose.  We will expand her regimen to 8 weeks.  She wishes to continue as she has less frequency, less urgency, and less nocturia.  Renal colic CAT scan does not identify any stones in the kidneys or ureters.  Urine culture no growth.  Urinalysis was negative for blood.     August 16, 2024, patient arrives alone.  As we have discussed before, the patient was in control regarding continuation of dilatation.  She felt that she no longer had frequency, urgency or nocturia and discontinued plans for any further urethral dilatation.  She returns today complaining of urinary frequency, urinary urgency, and that her urine is \"blocked\" when she is trying to urinate.  She states that she is now straining to urinate and wishes to reinitiate a schedule once again.  She continues on Keflex twice a week 500 mg.     September 9, 2024, cystoscopy with urethral dilatation to 24 South African with moderate pain and no bleeding.  Erythema posteriorly.  Dense upward sloping urethral stricture.  She would like to start her regimen again and will return in 4 weeks.  PVR of 83 cc.  She continues on Keflex 500 mg twice a week.  Urinalysis was negative for blood and urine culture no growth.     October 9, 2024, cystoscopy with urethral dilatation to 24 South African with minimal pain and no bleeding.  Mild erythema in the bladder base.  Dense upward sloping urethral stricture.  No further straining for urination, no further " urinary frequency and no further postvoid dribbling.  PVR of 16 cc.  We will expand her regimen to 5 weeks.  Urinalysis was negative for blood and urine culture no growth.  Continues on Keflex 500 mg twice a week.     January 4, 2025, cystoscopy with urethral dilatation of a dense upward sloping urethral stricture to 24 Comoran with minimal discomfort and no bleeding.  Erythema is significant posteriorly.  Patient is quite happy with her regimen and no longer experiences straining with urination, urinary frequency or postvoid dribbling.  PVR of 18 cc.  She continues on Keflex 500 mg twice a week.  Urinalysis was negative for blood and urine culture no growth.  She was delayed for this procedure due to a bout of diverticulitis which resulted in a bowel perforation.  She will return in 6 weeks.     March 19, 2025, cystoscopy with urethral dilatation of an upward sloping dense urethral stricture to 24 Comoran with minimal discomfort and no bleeding.  Erythema is confined to the bladder base and trigone with a clear line of demarcation.  PVR of 24 cc.  She is very happy with the regimen no longer experiencing straining with urination, urinary frequency or postvoid dribbling.  She also was not having repeat UTIs.  She is on Keflex 500 mg twice a week.  Her general surgeon, Dr. Sanjay Gomez, has recommended discontinuing that medication.  Urinalysis is negative for blood and urine culture no growth.  We will expand to 7 weeks.     May 7, 2025, cystoscopy with urethral dilatation of a dense upward sloping urethral stricture to 24 Comoran.  Minimal pain and no bleeding.  Erythema confined to the bladder base.  PVR 23 cc.  Patient is quite pleased with her regimen.  She no longer experiences straining with urination, urinary frequency or postvoid dribbling.  She also has no longer had repeat UTIs.  Urinalysis is negative for blood.  Urine culture no growth.  Renal colic CAT scan does not identify any stones.  Previously  described stranding in the mesentery in the right lower quadrant has nearly completely resolved.  However there is been an interval development of a mildly prominent right lower quadrant mesenteric node measuring 1.1 cm.  Most likely reactive.  Follow-up recommended as before.  We will expand to 8 weeks.    July 12, 2025, telephone call, E. coli UTI.  Treated with Augmentin.    July 30, 2025, cystoscopy with dilatation of a dense upward sloping urethral stricture to 24 Bruneian.  Minimal pain and no bleeding.  Once again, erythema confined to the bladder base.  PVR 86 cc.  She is happy with this regimen.  No longer experiencing straining with urination, urinary frequency or postvoid dribbling.  We will expand to 9 weeks.     PLAN:     1. The patient will return in 9 weeks  INCREASE TIME PERIODS WEEKLY. for cystoscopy with urethral dilatation to 24 Bruneian, PVR and a URINALYSIS AND URINE CULTURE. She will receive PROPHYLAXIS WITH KEFLEX 250 mg p.o. every 12 hours for 4 doses. We will HOLD the size at 24 Bruneian       3.  In April 2026, cystoscopy with dilatation to 24 Bruneian, PVR, urinalysis, urine culture, renal colic CAT scan     Physical Exam  Vitals and nursing note reviewed. Exam conducted with a chaperone present.   Constitutional:       Appearance: Normal appearance.   HENT:      Head: Normocephalic and atraumatic.   Pulmonary:      Effort: Pulmonary effort is normal.   Abdominal:      Palpations: Abdomen is soft.      Tenderness: There is no abdominal tenderness.   Genitourinary:     General: Normal vulva.      Vagina: No vaginal discharge.   Musculoskeletal:         General: Normal range of motion.      Cervical back: Normal range of motion and neck supple.   Neurological:      General: No focal deficit present.      Mental Status: She is alert and oriented to person, place, and time.   Psychiatric:         Mood and Affect: Mood normal.         Behavior: Behavior normal.        This note was created with  voice-recognition software and was not corrected for typographical or grammatical errors

## 2025-07-30 NOTE — LETTER
July 30, 2025     Natalie Lombardo MD  [address]  [facility] [address]    Patient: Kaelyn Mohamud   YOB: 1955   Date of Visit: 7/30/2025       Dear Dr. Natalie Lombardo MD:    Thank you for referring Kaelyn Mohamud to me for evaluation. Below are my notes for this consultation.  If you have questions, please do not hesitate to call me. I look forward to following your patient along with you.       Sincerely,     Dale Wagoner MD      CC: No Recipients  ______________________________________________________________________________________    Provider Impressions     70 year-old white female self-referred, previous patient of Dr. Oliver Smallwood, for RECURRENT UTI AND BLADDER SPASMS. Urinalysis is negative for blood. CAT scan of the chest, abdomen and pelvis is negative. Renal bladder ultrasound identifies a 3 mm right RENAL CALCULUS. Urine culture was negative but then a second urine culture with was positive for Klebsiella, UTI. Patient retired in 2018, previously employed in [id number]. No family history of breast or prostate cancer. The patient has never smoked cigarettes.      MACROBID  allergy     April 17, 2023, patient arrives alone. She states she has had Recurrent UTIs with Urgency and Frequency. She states that she has had URETHRAL STRICTURES. We discussed the etiology of recurrent UTIs and poor bladder emptying. She still has her uterus and ovaries. She is sexually active. She takes showers, not tub baths. She does not use a swimming pool, hot tub or bicycle. She has been diagnosed with a URETHRAL STRICTURE in the past. We will initiate cystoscopy with urethral dilatation, flow studies and a discussion of treatment options. She may require an alpha agent. Urine culture was negative as well as urine cytology     May 19, 2023, CYSTOSCOPY WITH URETHRAL DILATATION a 20 Cambodian, flow studies and a discussion of treatment options. DENSE URETHRAL STRICTURE. Severe  erythema in the lower half of the bladder. We will increase to 22 [place] and 2 months. Flow rate 9 cc/s PVR 0 cc. Urinalysis and urine culture were negative. We will not add an alpha agent for antibiotic at this time.     August 9, 2023, cystoscopy with urethral dilatation to 20 [place]. Attempts to 22 were unsuccessful due to pain. However the patient had a recent UTI and we will attempt that again at her next visit. Flow rate 2 cc/s, total volume 17 cc, PVR 0 cc. She states that after her first dilatation she noticed a dramatic improvement in flow and emptying. We will try to continue to increase the size and strongly consider a low-dose daily cephalosporin if necessary.     August 26, 2023, cystoscopy with urethral dilatation to 22 [place] of a dense urethral stricture upward sloping. Moderate discomfort with no bleeding. Significant erythema in the bladder base and trigone with a very clear line of demarcation. No tumors or stones. Flow rate 2 cc/s, total volume 19 cc, PVR 0 cc. Urinalysis is negative for blood and urine culture no growth. She will return in 4 weeks and we will increase the size to 24 [place] at patient's request.     September 16, 2023, telephone call, urine culture was positive for E. coli treated with Bactrim     September 23, 2023, cystoscopy with dilatation of a dense urethral stricture to 24 [place] with moderate bleeding and moderate pain. Erythema confined to the bladder base and trigone. We will not increase the size as this appears to be her limit. . I have added Bactrim single strength daily at this time.. She will return in 4 weeks and then we will increase times weekly     December 16, 2023, cystoscopy with dilatation of a dense urethral stricture to 24 [place] with minimal bleeding and moderate pain. Erythema confined to the trigone. We will not increase the size as this appears to be her limit. . I have added Bactrim single strength daily at this time. She will return in 5 weeks and  "then we will increase times weekly     January 20, 2024, telephone call, urine culture is positive for E. coli.  Keflex ordered.  Resistant to Bactrim.     January 23, 2024, cystoscopy with dilatation of a dense urethral stricture to 24 Iranian with minimal pain and minimal bleeding.  Once again erythema confined to the trigone.  24 Iranian is the patient's limit.  We will change her daily Bactrim to Keflex as it was resistant to her most recent E. coli infection.  We will expand to 6-week regimen.     March 5, 2024, cystoscopy with dilatation of a dense upward sloping urethral stricture to 24 Iranian with moderate discomfort.  Erythema confined to the bladder base and trigone.  The patient cannot tolerate dilation larger than 24 Iranian.  She maintains her daily Keflex.  We will expand to 7 weeks.     April 23, 2024, cystoscopy with dilatation of a dense upward sloping urethral stricture to 24 Iranian with minimal discomfort.  Erythema again in the bladder base and trigone.  This patient cannot tolerate dilatation larger than the 24 Iranian size.  We will transition her from daily Keflex to twice a week Keflex at the 500 mg dose.  We will expand her regimen to 8 weeks.  She wishes to continue as she has less frequency, less urgency, and less nocturia.  Renal colic CAT scan does not identify any stones in the kidneys or ureters.  Urine culture no growth.  Urinalysis was negative for blood.     August 16, 2024, patient arrives alone.  As we have discussed before, the patient was in control regarding continuation of dilatation.  She felt that she no longer had frequency, urgency or nocturia and discontinued plans for any further urethral dilatation.  She returns today complaining of urinary frequency, urinary urgency, and that her urine is \"blocked\" when she is trying to urinate.  She states that she is now straining to urinate and wishes to reinitiate a schedule once again.  She continues on Keflex twice a week 500 mg.   "   September 9, 2024, cystoscopy with urethral dilatation to 24 Ghanaian with moderate pain and no bleeding.  Erythema posteriorly.  Dense upward sloping urethral stricture.  She would like to start her regimen again and will return in 4 weeks.  PVR of 83 cc.  She continues on Keflex 500 mg twice a week.  Urinalysis was negative for blood and urine culture no growth.     October 9, 2024, cystoscopy with urethral dilatation to 24 Ghanaian with minimal pain and no bleeding.  Mild erythema in the bladder base.  Dense upward sloping urethral stricture.  No further straining for urination, no further urinary frequency and no further postvoid dribbling.  PVR of 16 cc.  We will expand her regimen to 5 weeks.  Urinalysis was negative for blood and urine culture no growth.  Continues on Keflex 500 mg twice a week.     January 4, 2025, cystoscopy with urethral dilatation of a dense upward sloping urethral stricture to 24 Ghanaian with minimal discomfort and no bleeding.  Erythema is significant posteriorly.  Patient is quite happy with her regimen and no longer experiences straining with urination, urinary frequency or postvoid dribbling.  PVR of 18 cc.  She continues on Keflex 500 mg twice a week.  Urinalysis was negative for blood and urine culture no growth.  She was delayed for this procedure due to a bout of diverticulitis which resulted in a bowel perforation.  She will return in 6 weeks.     March 19, 2025, cystoscopy with urethral dilatation of an upward sloping dense urethral stricture to 24 Ghanaian with minimal discomfort and no bleeding.  Erythema is confined to the bladder base and trigone with a clear line of demarcation.  PVR of 24 cc.  She is very happy with the regimen no longer experiencing straining with urination, urinary frequency or postvoid dribbling.  She also was not having repeat UTIs.  She is on Keflex 500 mg twice a week.  Her general surgeon, Dr. Sanjay Gomez, has recommended discontinuing that  medication.  Urinalysis is negative for blood and urine culture no growth.  We will expand to 7 weeks.     May 7, 2025, cystoscopy with urethral dilatation of a dense upward sloping urethral stricture to 24 Kazakh.  Minimal pain and no bleeding.  Erythema confined to the bladder base.  PVR 23 cc.  Patient is quite pleased with her regimen.  She no longer experiences straining with urination, urinary frequency or postvoid dribbling.  She also has no longer had repeat UTIs.  Urinalysis is negative for blood.  Urine culture no growth.  Renal colic CAT scan does not identify any stones.  Previously described stranding in the mesentery in the right lower quadrant has nearly completely resolved.  However there is been an interval development of a mildly prominent right lower quadrant mesenteric node measuring 1.1 cm.  Most likely reactive.  Follow-up recommended as before.  We will expand to 8 weeks.    July 12, 2025, telephone call, E. coli UTI.  Treated with Augmentin.    July 30, 2025, cystoscopy with dilatation of a dense upward sloping urethral stricture to 24 Kazakh.  Minimal pain and no bleeding.  Once again, erythema confined to the bladder base.  PVR 86 cc.  She is happy with this regimen.  No longer experiencing straining with urination, urinary frequency or postvoid dribbling.  We will expand to 9 weeks.     PLAN:     1. The patient will return in 9 weeks  INCREASE TIME PERIODS WEEKLY. for cystoscopy with urethral dilatation to 24 Kazakh, PVR and a URINALYSIS AND URINE CULTURE. She will receive PROPHYLAXIS WITH KEFLEX 250 mg p.o. every 12 hours for 4 doses. We will HOLD the size at 24 Kazakh       3.  In April 2026, cystoscopy with dilatation to 24 Kazakh, PVR, urinalysis, urine culture, renal colic CAT scan     Physical Exam  Vitals and nursing note reviewed. Exam conducted with a chaperone present.   Constitutional:       Appearance: Normal appearance.   HENT:      Head: Normocephalic and atraumatic.  "  Pulmonary:      Effort: Pulmonary effort is normal.   Abdominal:      Palpations: Abdomen is soft.      Tenderness: There is no abdominal tenderness.   Genitourinary:     General: Normal vulva.      Vagina: No vaginal discharge.   Musculoskeletal:         General: Normal range of motion.      Cervical back: Normal range of motion and neck supple.   Neurological:      General: No focal deficit present.      Mental Status: She is alert and oriented to person, place, and time.   Psychiatric:         Mood and Affect: Mood normal.         Behavior: Behavior normal.        This note was created with voice-recognition software and was not corrected for typographical or grammatical errors    Patient ID: Kaelyn Mohamud is a 70 y.o. female.    Procedures  Pt took Keflex 250mg po as prescribed  Anesthesia: Local 2% Lidocaine  Instruments: 6F flexible disposable Cystoscope, female sounds     Pt brought to procedure room and placed in dorsal lithotomy position. Pt draped and prepped in normal sterile fashion. 5ml lidocaine instilled into urethral meatus and 5ml instilled into urethra (urethra). Pt tolerated well.     I was present as chaperone for the entirety of the procedure   Eliza Mai  Cystoscopy performed by Dr. Dale Wagoner        Bedside \"Time Out\" Verification   Today's Date: 07/30/2025 I attest that this time out verification took place prior to the procedure.   Procedure: Cysto/dil  RN/LPN/MA: KAREN   Provider: [facility].   Verified By: MA and Provider, Dr. Dale Wagoner.   Prior to the start of the procedure a time out was taken and the following were verified: the identity of the patient using two patient identifiers, the correct procedure, the correct site marked as indicated, the correct positioning for the patient and the correct equipment was obtained.   Cystoscopy - Kaelyn Mohamud   identified using two (2) forms of identification.   Procedure: diagnostic Cystourethroscopy   Procedure Note: Time Started: " 10:30am   Time Completed: 11:20 AM  Indications for procedure: Cysto/Dil 24F- Urethral Stricture      Discussed with patient: Risks, benefits, and alternative were discussed in detail. Patient appears to understand and agrees to proceed. Patient has signed the procedure consent form.    CYSTOSCOPY:    Cystoscopy today reveals a dense urethral stricture dilated to 24 Uzbek with minimal pain and no bleeding.  Once inside the bladder, both ureteral orifices were identified.  A full examination of the bladder did not reveal any evidence of stones or tumors.  Posterior erythema was seen once again.  PVR 86 cc.

## 2025-07-30 NOTE — PROGRESS NOTES
"Patient ID: Kaelyn Mohamud is a 70 y.o. female.    Procedures  Pt took Keflex 250mg po as prescribed  Anesthesia: Local 2% Lidocaine  Instruments: 6F flexible disposable Cystoscope, female sounds     Pt brought to procedure room and placed in dorsal lithotomy position. Pt draped and prepped in normal sterile fashion. 5ml lidocaine instilled into urethral meatus and 5ml instilled into urethra (urethra). Pt tolerated well.     I was present as chaperone for the entirety of the procedure   Eliza Mai  Cystoscopy performed by Dr. Dale Wagoner        Bedside \"Time Out\" Verification   Today's Date: 07/30/2025 I attest that this time out verification took place prior to the procedure.   Procedure: Cysto/dil  RN/LPN/MA: KAREN   Provider: [facility].   Verified By: MA and Provider, Dr. Dale Wagoner.   Prior to the start of the procedure a time out was taken and the following were verified: the identity of the patient using two patient identifiers, the correct procedure, the correct site marked as indicated, the correct positioning for the patient and the correct equipment was obtained.   Cystoscopy - Kaelyn Mohamud   identified using two (2) forms of identification.   Procedure: diagnostic Cystourethroscopy   Procedure Note: Time Started: 10:30am   Time Completed: 11:20 AM  Indications for procedure: Cysto/Dil 24F- Urethral Stricture      Discussed with patient: Risks, benefits, and alternative were discussed in detail. Patient appears to understand and agrees to proceed. Patient has signed the procedure consent form.    CYSTOSCOPY:    Cystoscopy today reveals a dense urethral stricture dilated to 24 German with minimal pain and no bleeding.  Once inside the bladder, both ureteral orifices were identified.  A full examination of the bladder did not reveal any evidence of stones or tumors.  Posterior erythema was seen once again.  PVR 86 cc.     "

## 2025-07-30 NOTE — PATIENT INSTRUCTIONS
Patient Discussion/Summary     It was very nice to see you again today. We had a long discussion regarding recurrent urinary tract infections and urethral strictures. Today you were dilated to 24 Japanese with moderate discomfort.     I will see you again in 9 weeks.      This note was created with voice-recognition software and was not corrected for typographical or grammatical errors

## 2025-08-09 ENCOUNTER — APPOINTMENT (OUTPATIENT)
Dept: RADIOLOGY | Facility: HOSPITAL | Age: 70
End: 2025-08-09
Payer: COMMERCIAL

## 2025-08-09 ENCOUNTER — HOSPITAL ENCOUNTER (EMERGENCY)
Facility: HOSPITAL | Age: 70
Discharge: HOME | End: 2025-08-09
Payer: COMMERCIAL

## 2025-08-09 VITALS
RESPIRATION RATE: 18 BRPM | TEMPERATURE: 97 F | HEART RATE: 78 BPM | HEIGHT: 64 IN | OXYGEN SATURATION: 100 % | BODY MASS INDEX: 22.02 KG/M2 | DIASTOLIC BLOOD PRESSURE: 83 MMHG | SYSTOLIC BLOOD PRESSURE: 164 MMHG | WEIGHT: 129 LBS

## 2025-08-09 DIAGNOSIS — J06.9 UPPER RESPIRATORY TRACT INFECTION, UNSPECIFIED TYPE: Primary | ICD-10-CM

## 2025-08-09 LAB — SARS-COV-2 RNA RESP QL NAA+PROBE: NOT DETECTED

## 2025-08-09 PROCEDURE — 71045 X-RAY EXAM CHEST 1 VIEW: CPT | Mod: FOREIGN READ | Performed by: RADIOLOGY

## 2025-08-09 PROCEDURE — 87635 SARS-COV-2 COVID-19 AMP PRB: CPT | Performed by: NURSE PRACTITIONER

## 2025-08-09 PROCEDURE — 99284 EMERGENCY DEPT VISIT MOD MDM: CPT

## 2025-08-09 PROCEDURE — 71045 X-RAY EXAM CHEST 1 VIEW: CPT

## 2025-08-09 RX ORDER — AMOXICILLIN AND CLAVULANATE POTASSIUM 875; 125 MG/1; MG/1
1 TABLET, FILM COATED ORAL EVERY 12 HOURS
Qty: 14 TABLET | Refills: 0 | Status: SHIPPED | OUTPATIENT
Start: 2025-08-09 | End: 2025-08-16

## 2025-08-09 RX ORDER — BENZONATATE 100 MG/1
200 CAPSULE ORAL 3 TIMES DAILY PRN
Qty: 9 CAPSULE | Refills: 0 | Status: SHIPPED | OUTPATIENT
Start: 2025-08-09

## 2025-08-09 ASSESSMENT — PAIN SCALES - GENERAL: PAINLEVEL_OUTOF10: 5 - MODERATE PAIN

## 2025-08-09 ASSESSMENT — PAIN - FUNCTIONAL ASSESSMENT: PAIN_FUNCTIONAL_ASSESSMENT: 0-10

## 2025-08-09 NOTE — ED PROVIDER NOTES
HPI   Chief Complaint   Patient presents with    Flu Symptoms     Cough, congestion, sore throat        70-year-old female presents emergency department, tells me she is a started with symptoms that felt like her allergies were acting up, then states couple days ago started to feel unwell, tired and achy, states developed cough.  Patient states her symptoms persist.  States she has some pain in her lungs.  Tells me she had pneumonia 1 to 28 years ago and is concerned that this is what is causing her discomfort today.  Denies any smoking history      History provided by:  Patient   used: No            Patient History   Medical History[1]  Surgical History[2]  Family History[3]  Social History[4]    Physical Exam   ED Triage Vitals [08/09/25 1225]   Temperature Heart Rate Respirations BP   36.1 °C (97 °F) 90 18 164/83      Pulse Ox Temp src Heart Rate Source Patient Position   98 % -- -- --      BP Location FiO2 (%)     -- --       Physical Exam  Constitutional: Vitals noted, no distress. Afebrile.   EENT: TMs clear. Posterior oropharynx unremarkable.   Cardiovascular: Regular, rate, rhythm, no murmur.   Pulmonary: Lungs clear bilaterally with good aeration. No adventitious breath sounds.   Gastrointestinal: Soft, nonsurgical. Nontender. No peritoneal signs. Normoactive bowel sounds.   Musculoskeletal: No peripheral edema. Negative Homans bilaterally, no cords.   Skin: No rash.   Neuro: No focal neurologic deficits, NIH score of 0.      ED Course & MDM   Diagnoses as of 08/09/25 1506   Upper respiratory tract infection, unspecified type       Labs Reviewed   SARS-COV-2 PCR - Normal       Result Value    Coronavirus 2019, PCR Not Detected      Narrative:     This assay is an FDA-cleared, in vitro diagnostic nucleic acid amplification test for the qualitative detection and differentiation of SARS CoV-2 from nasopharyngeal specimens collected from individuals with signs and symptoms of respiratory  "tract infections, and has been validated for use at [facility]. Negative results do not preclude COVID-19 infections and should not be used as the sole basis for diagnosis, treatment, or other management decisions. Testing for SARS CoV-2 is recommended only for patients who meet current clinical and/or epidemiological criteria defined by federal, state, or local public health directives.        XR chest 1 view   Final Result   No acute cardiopulmonary process.   Signed by Florentin Plasencia MD                 No data recorded     Ephraim Coma Scale Score: 15 (08/09/25 1227 : Kathryn Ponce RN)                   Medical Decision Making  Chest x-ray obtained, ultimately shows no acute cardiopulmonary process, patient was tested for COVID-19, negative.    Discussed results with the patient, given her worsening cough and congestion as well as history of pneumonia I will treat for possible bacterial source, started on Augmentin, Tessalon Perle for the cough.  Discussed follow-up in 3 days with primary care, return with any worsening symptoms or additional concerns.    Procedure  Procedures       [1]   Past Medical History:  Diagnosis Date    Allergies     Anxiety     Arthritis     Diverticulosis     GERD (gastroesophageal reflux disease)     Hiatal hernia     Hyperlipidemia     Plantar fascial fibromatosis 10/06/2020    Plantar fasciitis of left foot    Urinary tract infection     Vertigo     Wears dentures     upper    Wears glasses    [2]   Past Surgical History:  Procedure Laterality Date    ABCESS DRAINAGE      perianal    ABSCESS DRAINAGE      \"skin abscess\"    CATARACT EXTRACTION      CHOLECYSTECTOMY      COLONOSCOPY      FOOT SURGERY      plantar fascitis    LAPAROSCOPY REPAIR HIATAL HERNIA      UPPER GASTROINTESTINAL ENDOSCOPY      US ASPIRATION INJECTION INTERMEDIATE JOINT  01/25/2022    US ASPIRATION INJECTION INTERMEDIATE JOINT 1/25/2022 [facility]   [3]   Family " History  Problem Relation Name Age of Onset    Lung cancer Mother's Sister     [4]   Social History  Tobacco Use    Smoking status: Never    Smokeless tobacco: Never   Vaping Use    Vaping status: Never Used   Substance Use Topics    Alcohol use: Yes     Alcohol/week: 4.0 standard drinks of alcohol     Types: 2 Cans of beer, 2 Shots of liquor per week    Drug use: Never        MALIK Damon-CNP  08/09/25 1504

## 2025-08-13 ENCOUNTER — OFFICE VISIT (OUTPATIENT)
Dept: PRIMARY CARE | Facility: CLINIC | Age: 70
End: 2025-08-13
Payer: COMMERCIAL

## 2025-08-13 VITALS
WEIGHT: 124 LBS | BODY MASS INDEX: 21.17 KG/M2 | HEART RATE: 85 BPM | HEIGHT: 64 IN | DIASTOLIC BLOOD PRESSURE: 72 MMHG | SYSTOLIC BLOOD PRESSURE: 122 MMHG | TEMPERATURE: 97.6 F | OXYGEN SATURATION: 97 %

## 2025-08-13 DIAGNOSIS — T36.95XA ANTIBIOTIC-ASSOCIATED DIARRHEA: ICD-10-CM

## 2025-08-13 DIAGNOSIS — J06.9 URTI (ACUTE UPPER RESPIRATORY INFECTION): Primary | ICD-10-CM

## 2025-08-13 DIAGNOSIS — K52.1 ANTIBIOTIC-ASSOCIATED DIARRHEA: ICD-10-CM

## 2025-08-13 PROCEDURE — 3008F BODY MASS INDEX DOCD: CPT | Performed by: INTERNAL MEDICINE

## 2025-08-13 PROCEDURE — 99213 OFFICE O/P EST LOW 20 MIN: CPT | Performed by: INTERNAL MEDICINE

## 2025-08-13 PROCEDURE — 1159F MED LIST DOCD IN RCRD: CPT | Performed by: INTERNAL MEDICINE

## 2025-08-13 PROCEDURE — 1036F TOBACCO NON-USER: CPT | Performed by: INTERNAL MEDICINE

## 2025-08-13 ASSESSMENT — ENCOUNTER SYMPTOMS
NEUROLOGICAL NEGATIVE: 1
CARDIOVASCULAR NEGATIVE: 1
HEMATOLOGIC/LYMPHATIC NEGATIVE: 1
SHORTNESS OF BREATH: 0
WHEEZING: 0
EYES NEGATIVE: 1
CONSTITUTIONAL NEGATIVE: 1

## 2025-08-17 DIAGNOSIS — F41.9 ANXIETY: ICD-10-CM

## 2025-08-18 RX ORDER — HYDROXYZINE PAMOATE 25 MG/1
25 CAPSULE ORAL
Qty: 30 CAPSULE | Refills: 0 | Status: SHIPPED | OUTPATIENT
Start: 2025-08-18

## 2025-08-29 ENCOUNTER — HOSPITAL ENCOUNTER (OUTPATIENT)
Dept: RADIOLOGY | Facility: HOSPITAL | Age: 70
Discharge: HOME | End: 2025-08-29
Payer: COMMERCIAL

## 2025-08-29 VITALS — HEIGHT: 64 IN | WEIGHT: 124 LBS | BODY MASS INDEX: 21.17 KG/M2

## 2025-08-29 DIAGNOSIS — Z12.31 SCREENING MAMMOGRAM FOR BREAST CANCER: ICD-10-CM

## 2025-08-29 PROCEDURE — 77067 SCR MAMMO BI INCL CAD: CPT

## 2025-08-30 DIAGNOSIS — N39.0 URINARY TRACT INFECTION WITHOUT HEMATURIA, SITE UNSPECIFIED: ICD-10-CM

## 2025-10-01 ENCOUNTER — APPOINTMENT (OUTPATIENT)
Dept: UROLOGY | Facility: CLINIC | Age: 70
End: 2025-10-01
Payer: COMMERCIAL

## 2026-01-29 ENCOUNTER — APPOINTMENT (OUTPATIENT)
Dept: PRIMARY CARE | Facility: CLINIC | Age: 71
End: 2026-01-29
Payer: COMMERCIAL

## (undated) DEVICE — TOWEL PACK 10-PK

## (undated) DEVICE — SUTURE, MONOCRYL, 4-0, 27 IN, PS-2, UNDYED

## (undated) DEVICE — BAG, TISSUE RETRIEVAL, 10MM, ANCHOR

## (undated) DEVICE — HOLSTER, JET SAFETY

## (undated) DEVICE — STRAP, VELCRO, BODY, 4 X 60IN, NS

## (undated) DEVICE — CANNULA, DILATOR, W/RADICALLY EXPANDABLE SLEEVE, VERSASTEP PLUS, 12 MM

## (undated) DEVICE — Device

## (undated) DEVICE — GLOVE, SURGICAL, PROTEXIS PI BLUE W/NEUTHERA, 8.0, PF, LF

## (undated) DEVICE — TROCAR, OPTICAL BLADELESS 5MM X 10 W/ADVANCED FIXATION

## (undated) DEVICE — SUTURE, ETHIBOND XTRA, 0, SHSH, 36IN, LF

## (undated) DEVICE — SUTURE, ETHIBOND, XTRA, 30 IN, 0, CTX, GREEN

## (undated) DEVICE — GLOVE, SURGICAL, PROTEXIS PI , 7.5, PF, LF

## (undated) DEVICE — TUBING, INSUFFLATION, LAPAROSCOPIC, FILTER, 10 FT

## (undated) DEVICE — GOWN, SURGICAL, ROYAL SILK, LG, STERILE

## (undated) DEVICE — DRAPE, INSTRUMENT, W/POUCH, STERI DRAPE, 7 X 11 IN, DISPOSABLE, STERILE

## (undated) DEVICE — DRAPE, INCISE, ANTIMICROBIAL, IOBAN 2, LARGE, 17 X 23 IN, DISPOSABLE, STERILE

## (undated) DEVICE — APPLICATOR, COTTON TIP, 6 IN, 2PK, STERILE

## (undated) DEVICE — GOWN, SURGICAL, ROYAL SILK, XL, STERILE

## (undated) DEVICE — DEVICE, SUTURE, ENDOSTITCH, 10 MM

## (undated) DEVICE — SOLUTION KIT, ANTIFOG, 6CC, LF

## (undated) DEVICE — DRAIN, PENROSE, 5/8 X 12IN,  LF

## (undated) DEVICE — SYSTEM, SMOKE EVAC, SEECLEAR XCL, 8.0 LITER, LF

## (undated) DEVICE — TRAY, SURESTEP, SILICONE DRAINAGE BAG, STATLOCK, 16FR

## (undated) DEVICE — MANIFOLD, 4 PORT NEPTUNE STANDARD

## (undated) DEVICE — NEEDLE, SAFETY, 22 G X 1.5 IN

## (undated) DEVICE — WARMER, DUAL SCOPE

## (undated) DEVICE — LIGASURE, V SEALER/DIVIDER  5MM BLUNT TIP

## (undated) DEVICE — TIP, ENDOLUMINA, DETACHABLE, 56 FR

## (undated) DEVICE — DRAPE, SHEET, ENDOSCOPY, GENERAL, FENESTRATED, ARMBOARD COVER, 98 X 123.5 IN, DISPOSABLE, LF, STERILE

## (undated) DEVICE — CORD, MONOPOLARD, 10FT, DISP

## (undated) DEVICE — SYRINGE, 10 CC, LUER LOCK

## (undated) DEVICE — STRAP, ARM BOARD, 32 X 1.5

## (undated) DEVICE — APPLICATOR, CHLORAPREP, W/ORANGE TINT, 26ML

## (undated) DEVICE — PUMP, STRYKERFLOW 2 & HANDPIECE W/10FT. IRRIGATION TUBING

## (undated) DEVICE — POSITIONING SYSTEM, PAGAZZI, PATIENT

## (undated) DEVICE — RELOAD, ENDOSTITCH, SURGIDAC, 0, 48 IN, GREEN, SULU

## (undated) DEVICE — SUTURE, ETHIBOND, XTRA, 30 IN, 0, CT-1, GREEN

## (undated) DEVICE — SOLUTION, IRRIGATION, USP, SODIUM CHLORIDE 0.9%, 3000 ML